# Patient Record
Sex: FEMALE | Race: AMERICAN INDIAN OR ALASKA NATIVE
[De-identification: names, ages, dates, MRNs, and addresses within clinical notes are randomized per-mention and may not be internally consistent; named-entity substitution may affect disease eponyms.]

---

## 2018-07-06 ENCOUNTER — HOSPITAL ENCOUNTER (INPATIENT)
Dept: HOSPITAL 5 - ED | Age: 63
LOS: 5 days | Discharge: HOME | DRG: 287 | End: 2018-07-11
Attending: INTERNAL MEDICINE | Admitting: HOSPITALIST
Payer: COMMERCIAL

## 2018-07-06 DIAGNOSIS — I50.82: ICD-10-CM

## 2018-07-06 DIAGNOSIS — I07.1: ICD-10-CM

## 2018-07-06 DIAGNOSIS — Q21.1: ICD-10-CM

## 2018-07-06 DIAGNOSIS — R09.02: ICD-10-CM

## 2018-07-06 DIAGNOSIS — Z23: ICD-10-CM

## 2018-07-06 DIAGNOSIS — E11.65: ICD-10-CM

## 2018-07-06 DIAGNOSIS — I27.20: ICD-10-CM

## 2018-07-06 DIAGNOSIS — Z82.49: ICD-10-CM

## 2018-07-06 DIAGNOSIS — R94.31: ICD-10-CM

## 2018-07-06 DIAGNOSIS — I50.23: Primary | ICD-10-CM

## 2018-07-06 DIAGNOSIS — E87.6: ICD-10-CM

## 2018-07-06 DIAGNOSIS — I42.8: ICD-10-CM

## 2018-07-06 DIAGNOSIS — Z83.3: ICD-10-CM

## 2018-07-06 LAB
APTT BLD: 26.7 SEC. (ref 24.2–36.6)
BASOPHILS # (AUTO): 0.1 K/MM3 (ref 0–0.1)
BASOPHILS NFR BLD AUTO: 1 % (ref 0–1.8)
BUN SERPL-MCNC: 21 MG/DL (ref 7–17)
BUN/CREAT SERPL: 21 %
CALCIUM SERPL-MCNC: 8.9 MG/DL (ref 8.4–10.2)
EOSINOPHIL # BLD AUTO: 0.1 K/MM3 (ref 0–0.4)
EOSINOPHIL NFR BLD AUTO: 1.4 % (ref 0–4.3)
HCT VFR BLD CALC: 40.4 % (ref 30.3–42.9)
HEMOLYSIS INDEX: 5
HGB BLD-MCNC: 13.2 GM/DL (ref 10.1–14.3)
INR PPP: 1.16 (ref 0.87–1.13)
LYMPHOCYTES # BLD AUTO: 1.1 K/MM3 (ref 1.2–5.4)
LYMPHOCYTES NFR BLD AUTO: 17.5 % (ref 13.4–35)
MCH RBC QN AUTO: 29 PG (ref 28–32)
MCHC RBC AUTO-ENTMCNC: 33 % (ref 30–34)
MCV RBC AUTO: 90 FL (ref 79–97)
MONOCYTES # (AUTO): 0.5 K/MM3 (ref 0–0.8)
MONOCYTES % (AUTO): 7.1 % (ref 0–7.3)
PLATELET # BLD: 289 K/MM3 (ref 140–440)
RBC # BLD AUTO: 4.49 M/MM3 (ref 3.65–5.03)

## 2018-07-06 PROCEDURE — 84443 ASSAY THYROID STIM HORMONE: CPT

## 2018-07-06 PROCEDURE — 93320 DOPPLER ECHO COMPLETE: CPT

## 2018-07-06 PROCEDURE — 85730 THROMBOPLASTIN TIME PARTIAL: CPT

## 2018-07-06 PROCEDURE — 83036 HEMOGLOBIN GLYCOSYLATED A1C: CPT

## 2018-07-06 PROCEDURE — 71250 CT THORAX DX C-: CPT

## 2018-07-06 PROCEDURE — 93306 TTE W/DOPPLER COMPLETE: CPT

## 2018-07-06 PROCEDURE — C1760 CLOSURE DEV, VASC: HCPCS

## 2018-07-06 PROCEDURE — 82962 GLUCOSE BLOOD TEST: CPT

## 2018-07-06 PROCEDURE — 93312 ECHO TRANSESOPHAGEAL: CPT

## 2018-07-06 PROCEDURE — 80048 BASIC METABOLIC PNL TOTAL CA: CPT

## 2018-07-06 PROCEDURE — 93325 DOPPLER ECHO COLOR FLOW MAPG: CPT

## 2018-07-06 PROCEDURE — 87116 MYCOBACTERIA CULTURE: CPT

## 2018-07-06 PROCEDURE — 83880 ASSAY OF NATRIURETIC PEPTIDE: CPT

## 2018-07-06 PROCEDURE — 96374 THER/PROPH/DIAG INJ IV PUSH: CPT

## 2018-07-06 PROCEDURE — 71045 X-RAY EXAM CHEST 1 VIEW: CPT

## 2018-07-06 PROCEDURE — 85025 COMPLETE CBC W/AUTO DIFF WBC: CPT

## 2018-07-06 PROCEDURE — 90732 PPSV23 VACC 2 YRS+ SUBQ/IM: CPT

## 2018-07-06 PROCEDURE — 94640 AIRWAY INHALATION TREATMENT: CPT

## 2018-07-06 PROCEDURE — 93460 R&L HRT ART/VENTRICLE ANGIO: CPT

## 2018-07-06 PROCEDURE — 94760 N-INVAS EAR/PLS OXIMETRY 1: CPT

## 2018-07-06 PROCEDURE — 85610 PROTHROMBIN TIME: CPT

## 2018-07-06 PROCEDURE — 36415 COLL VENOUS BLD VENIPUNCTURE: CPT

## 2018-07-06 PROCEDURE — 93005 ELECTROCARDIOGRAM TRACING: CPT

## 2018-07-06 PROCEDURE — 84484 ASSAY OF TROPONIN QUANT: CPT

## 2018-07-06 PROCEDURE — 93010 ELECTROCARDIOGRAM REPORT: CPT

## 2018-07-06 RX ADMIN — IPRATROPIUM BROMIDE AND ALBUTEROL SULFATE SCH AMPUL: .5; 3 SOLUTION RESPIRATORY (INHALATION) at 22:28

## 2018-07-06 NOTE — XRAY REPORT
FINAL REPORT



EXAM:  XR CHEST 1V AP



HISTORY:  DIFF BREATHING 



TECHNIQUE:  One view examination of the chest



PRIORS:  None



FINDINGS:  

Bilateral lung base linear and patchy opacity is slightly more

prominent on the right. No focal consolidation. This may be

atelectasis, edema, or slight interstitial pneumonitis.



No definite evidence of pneumothorax or pleural effusion.



Cardiac silhouette size slightly enlarged without definite

vascular congestion. No visible acute displaced fracture.

Atherosclerotic change with calcification in aortic arch.



IMPRESSION:  

Bilateral lung base linear and slight patchy opacity may reflect

atelectasis, edema, and/or interstitial pneumonitis

## 2018-07-06 NOTE — EMERGENCY DEPARTMENT REPORT
HPI





- General


Chief Complaint: Dyspnea/Respdistress


Time Seen by Provider: 18 20:11





- HPI


HPI: 





Room 25





The patient is a 62-year-old female presenting with a chief complaint of 

shortness of breath.  The patient states she has had shortness of breath for 3 

months.  The patient states she's been to the hospital several times for 

shortness of breath but is persistently short of breath at discharge.  Patient 

states for the past 2 weeks she's noted bilateral lower extremity edema.  The 

patient states she's been compliant with her Lasix until she ran out 

approximately 1 week ago.  Patient denies chest pain





Location: Lungs, feet


Duration: [See above]


Quality: Shortness of breath


Severity:  moderate


Modifying factors: [see above]


Context: [see above]


Mode of transportation: [not driving]





ED Past Medical Hx





- Past Medical History


Hx Congestive Heart Failure: Yes





- Surgical History


Past Surgical History?: No





- Family History


Family history: no significant





- Social History


Smoking Status: Never Smoker


Substance Use Type: None (denies illicit drug use)





ED Review of Systems


ROS: 


Stated complaint: MINA


Other details as noted in HPI





Constitutional: no symptoms reported


Eyes: denies: eye pain


ENT: denies: throat pain


Respiratory: shortness of breath


Cardiovascular: denies: chest pain


Endocrine: denies: unexplained weight gain


Gastrointestinal: denies: abdominal pain


Genitourinary: denies: dysuria


Musculoskeletal: denies: back pain


Skin: denies: change in color


Neurological: denies: headache


Hematological/Lymphatic: other (bilateral lower extremity edema)





Physical Exam





- Physical Exam


Vital Signs: 


 Vital Signs











  18





  14:43 19:59


 


Temperature 98.1 F 


 


Pulse Rate 103 H 85


 


Respiratory 24 20





Rate  


 


Blood Pressure 105/71 


 


O2 Sat by Pulse 87 99





Oximetry  











Physical Exam: 





GENERAL: The patient is well-developed well-nourished female lying on stretcher 

not appearing to be in acute distress. []


HEENT: Normocephalic.  Atraumatic.  Extraocular motions are intact.  Patient 

has moist mucous membranes.


NECK: Supple.  Trachea midline


CHEST/LUNGS: Bibasilar crackles.  There is no respiratory distress noted.


HEART/CARDIOVASCULAR: Regular.  There is no tachycardia.  There is no gallop 

rub or murmur.


ABDOMEN: Abdomen is soft, nontender.  Patient has normal bowel sounds.  There 

is no abdominal distention.


SKIN: There is no rash.  There is 2-3+ bilateral lower extremity pitting edema.

  There is no diaphoresis.


NEURO: The patient is awake, alert, and oriented.  The patient is cooperative.  

The patient has normal speech 


MUSCULOSKELETAL:  There is no evidence of acute injury.





ED Course


 Vital Signs











  18





  14:43 19:59


 


Temperature 98.1 F 


 


Pulse Rate 103 H 85


 


Respiratory 24 20





Rate  


 


Blood Pressure 105/71 


 


O2 Sat by Pulse 87 99





Oximetry  














ED Medical Decision Making





- Lab Data


Result diagrams: 


 18 15:09





 18 15:09





 Laboratory Tests











  18





  15:09 15:09 15:09


 


WBC   6.3 


 


RBC   4.49 


 


Hgb   13.2 


 


Hct   40.4 


 


MCV   90 


 


MCH   29 


 


MCHC   33 


 


RDW   15.9 H 


 


Plt Count   289 


 


Lymph % (Auto)   17.5 


 


Mono % (Auto)   7.1 


 


Eos % (Auto)   1.4 


 


Baso % (Auto)   1.0 


 


Lymph #   1.1 L 


 


Mono #   0.5 


 


Eos #   0.1 


 


Baso #   0.1 


 


Seg Neutrophils %   73.0 H 


 


Seg Neutrophils #   4.6 


 


PT    15.4 H


 


INR    1.16 H


 


APTT    26.7


 


Sodium  138  


 


Potassium  3.7  


 


Chloride  99.9  


 


Carbon Dioxide  26  


 


Anion Gap  16  


 


BUN  21 H  


 


Creatinine  1.0  


 


Estimated GFR  57  


 


BUN/Creatinine Ratio  21  


 


Glucose  135 H  


 


Calcium  8.9  


 


Troponin T  0.021  


 


NT-Pro-B Natriuret Pep   














  18





  15:09


 


WBC 


 


RBC 


 


Hgb 


 


Hct 


 


MCV 


 


MCH 


 


MCHC 


 


RDW 


 


Plt Count 


 


Lymph % (Auto) 


 


Mono % (Auto) 


 


Eos % (Auto) 


 


Baso % (Auto) 


 


Lymph # 


 


Mono # 


 


Eos # 


 


Baso # 


 


Seg Neutrophils % 


 


Seg Neutrophils # 


 


PT 


 


INR 


 


APTT 


 


Sodium 


 


Potassium 


 


Chloride 


 


Carbon Dioxide 


 


Anion Gap 


 


BUN 


 


Creatinine 


 


Estimated GFR 


 


BUN/Creatinine Ratio 


 


Glucose 


 


Calcium 


 


Troponin T 


 


NT-Pro-B Natriuret Pep  2424 H














- EKG Data


-: EKG Interpreted by Me


EKG shows normal: sinus rhythm


Rate: normal





- EKG Data


When compared to previous EKG there are: previous EKG unavailable


Interpretation: other (no ischemic changes seen)





- Radiology Data


Radiology results: report reviewed (chest x-ray), image reviewed (chest x-ray)


interpreted by me: 





Chest x-ray-edema at bilateral bases.  No pneumothorax





[facility] [address] [place] [address] 

XRay Report Signed Patient: ROBERTO SNELL MR#: [id number] : 1955 Acct:

[id number] Age/Sex: 62 / F ADM Date: 18 Loc: ED Attending Dr: Ordering 

Physician: CARINA JACKSON MD Date of Service: 18 Procedure(s): XR chest 1V ap 

Accession Number(s): [id number] cc: ED MD MANUEL Fluoro Time In Minutes: FINAL 

REPORT EXAM: XR CHEST 1V AP HISTORY: DIFF BREATHING TECHNIQUE: One view 

examination of the chest PRIORS: None FINDINGS: Bilateral lung base linear and 

patchy opacity is slightly more prominent on the right. No focal consolidation. 

This may be atelectasis, edema, or slight interstitial pneumonitis. No definite 

evidence of pneumothorax or pleural effusion. Cardiac silhouette size slightly 

enlarged without definite vascular congestion. No visible acute displaced 

fracture. Atherosclerotic change with calcification in aortic arch. IMPRESSION: 

Bilateral lung base linear and slight patchy opacity may reflect atelectasis, 

edema, and/or interstitial pneumonitis Transcribed By: BAL Dictated By: CHULA GOOD MD Electronically Authenticated By: CHULA GOOD MD Signed Date/

Time: 18 DD/DT: 18 TD/TT: 18 





- Differential Diagnosis


CHF exacerbation, pneumonia, bronchitis


Critical care attestation.: 


If time is entered above; I have spent that time in minutes in the direct care 

of this critically ill patient, excluding procedure time.








ED Disposition


Clinical Impression: 


 Shortness of breath, CHF exacerbation





Disposition:  OP ADMIT IP TO THIS HOSP


Is pt being admited?: No


Does the pt Need Aspirin: No


Condition: Fair


Referrals: 


PRIMARY CARE,MD [Primary Care Provider] - 3-5 Days


Time of Disposition: 20:35 (hospitalist paged (Dr Bowen))

## 2018-07-06 NOTE — HISTORY AND PHYSICAL REPORT
History of Present Illness


Date of examination: 07/06/18


Date of admission: 


07/06/18 21:39





Chief complaint: 





Shortness of breath for 3 months


Chronic cough for 5 years


History of present illness: 





62-year-old -American female with a questionable history of congestive 

heart failure and apparently admitted to [facility] 3 times in 

the past 3 months, with a diagnosis of CHF and was told to see cardiologist and 

pulmonologist as an outpatient comes in with complaints of persistent shortness 

of breath for the past 3 months.  She says the shortness of breath is on 

minimal exertion.  She denies any exertional chest pain palpitations or 

syncope.  Denies any history of paroxysmal nocturnal dyspnea but states she 

sleeps almost in a sitting up position.  She denies any fever or chills.  Has 

chronic cough with mucoid sputum for the past 5 years.  She denies any nasal 

congestion or headaches.  Denies nausea vomiting diarrhea or abdominal pain 

hematemesis or melena.  Denies dysuria or urinary frequency.  She states she 

ran out of Lasix a week ago.  She denies taking any other medications or 

inhalers.  She states she has not seen a cardiologist or pulmonologist during 

the last 3 admissions and denies having had any echocardiogram


She is being admitted for further evaluation of for shortness of breath





Past History


Past Medical History: heart failure


Past Surgical History: No surgical history


Social history: no significant social history.  denies: smoking, alcohol abuse, 

prescription drug abuse, IV drug use


Family history: CAD, diabetes, hypertension





Medications and Allergies


 Allergies











Allergy/AdvReac Type Severity Reaction Status Date / Time


 


No Known Allergies Allergy   Unverified 07/06/18 14:43











Active Meds: 


Active Medications





Acetaminophen (Tylenol)  650 mg PO Q4H PRN


   PRN Reason: Pain MILD(1-3)/Fever >100.5/HA


Acetaminophen/Hydrocodone Bitart (Norco 5/325)  1 each PO Q6H PRN


   PRN Reason: Pain, Moderate (4-6)


Albuterol/Ipratropium (Duoneb *Not For Prn Use*)  1 ampul IH Q6HRT [facility]


   Last Admin: 07/06/18 22:28 Dose:  1 ampul


Furosemide (Lasix)  20 mg IV Q12H [facility]


Heparin Sodium (Porcine) (Heparin)  5,000 unit SUB-Q Q8HR [facility]


Lisinopril (Zestril)  2.5 mg PO DAILY [facility]


Metoprolol Tartrate (Lopressor)  6.25 mg PO Q12HR [facility]


Ondansetron HCl (Zofran)  4 mg IV Q8H PRN


   PRN Reason: Nausea And Vomiting


Sodium Chloride (Sodium Chloride Flush Syringe 10 Ml)  10 ml IV BID [facility]


Sodium Chloride (Sodium Chloride Flush Syringe 10 Ml)  10 ml IV PRN PRN


   PRN Reason: LINE FLUSH











Review of Systems


All systems: negative (as stated above in the history of present illness)





Exam





- Constitutional


Vitals: 


 











Temp Pulse Resp BP Pulse Ox


 


 98.1 F   81   15   120/88   98 


 


 07/06/18 14:43  07/06/18 22:44  07/06/18 22:51  07/06/18 22:40  07/06/18 22:51











General appearance: Present: no acute distress





- EENT


Eyes: Present: PERRL, EOM intact


ENT: hearing intact, clear oral mucosa





- Neck


Neck: Present: supple, normal ROM.  Absent: masses or JVD





- Respiratory


Respiratory effort: normal


Respiratory: bilateral: rales (bilateral basal Rales), negative: rhonchi, 

wheezing





- Cardiovascular


Rhythm: regular


Heart Sounds: Present: S1 & S2





- Extremities


Extremity abnormal: edema (proposed bilateral pitting leg edema)


Peripheral Pulses: within normal limits





- Abdominal


General gastrointestinal: Present: soft, non-tender.  Absent: hepatomegaly, 

splenomegaly


Female genitourinary: Present: deferred





- Rectal


Rectal Exam: deferred





- Integumentary


Integumentary: Present: clear





- Musculoskeletal


Musculoskeletal: strength equal bilaterally





- Psychiatric


Psychiatric: appropriate mood/affect





- Neurologic


Neurologic: no focal deficits, moves all extremities





Results





- Labs


CBC & Chem 7: 


 07/06/18 15:09





 07/06/18 15:09


Labs: 


 Abnormal lab results











  07/06/18 07/06/18 07/06/18 Range/Units





  15:09 15:09 15:09 


 


RDW   15.9 H   (13.2-15.2)  %


 


Lymph #   1.1 L   (1.2-5.4)  K/mm3


 


Seg Neutrophils %   73.0 H   (40.0-70.0)  %


 


PT    15.4 H  (12.2-14.9)  Sec.


 


INR    1.16 H  (0.87-1.13)  


 


BUN  21 H    (7-17)  mg/dL


 


Glucose  135 H    ()  mg/dL


 


NT-Pro-B Natriuret Pep     (0-900)  pg/mL














  07/06/18 Range/Units





  15:09 


 


RDW   (13.2-15.2)  %


 


Lymph #   (1.2-5.4)  K/mm3


 


Seg Neutrophils %   (40.0-70.0)  %


 


PT   (12.2-14.9)  Sec.


 


INR   (0.87-1.13)  


 


BUN   (7-17)  mg/dL


 


Glucose   ()  mg/dL


 


NT-Pro-B Natriuret Pep  2424 H  (0-900)  pg/mL














Assessment and Plan





- Patient Problems


(1) CHF exacerbation


Current Visit: Yes   Status: Acute   


Qualifiers: 


   Heart failure type: unspecified   Qualified Code(s): I50.9 - Heart failure, 

unspecified   


Plan to address problem: 


Admitted  the patient to telemetry


Oxygen via nasal cannula


We will order echocardiogram


Consult cardiology


BNP is moderately elevated


Troponin negative


EKG shows a heart rate of 100 bpm sinus tachycardia low-voltage QRS complex 

with right ventricular hypertrophy


Lasix IV


We will start the patient on low-dose ACE inhibitor with lisinopril 2.5 and 

metoprolol 6.25 mg twice a day


We will await cardiology follow-up for further recommendations








(2) Chronic cough


Current Visit: Yes   Status: Acute   


Plan to address problem: 


Patient states she has been coughing for the past 5 years


We will request a pulmonary consult to rule out ?? ILD


Empirically start the patient on DuoNeb solution via nebulizer and prednisone








(3) Hypoxia


Current Visit: Yes   Status: Acute   


Plan to address problem: 


Patient's O2 saturation initially was 85% unclear if this was on room air


However it went up to about 95% with oxygen








(4) Hyperglycemia


Current Visit: Yes   Status: Acute   


Plan to address problem: 


check hemoglobin A1c to rule out diabetes

## 2018-07-07 LAB
BUN SERPL-MCNC: 21 MG/DL (ref 7–17)
BUN/CREAT SERPL: 26 %
CALCIUM SERPL-MCNC: 8.6 MG/DL (ref 8.4–10.2)
HEMOLYSIS INDEX: 5

## 2018-07-07 PROCEDURE — 3E0234Z INTRODUCTION OF SERUM, TOXOID AND VACCINE INTO MUSCLE, PERCUTANEOUS APPROACH: ICD-10-PCS | Performed by: HOSPITALIST

## 2018-07-07 RX ADMIN — IPRATROPIUM BROMIDE AND ALBUTEROL SULFATE SCH: .5; 3 SOLUTION RESPIRATORY (INHALATION) at 21:02

## 2018-07-07 RX ADMIN — POTASSIUM CHLORIDE SCH MEQ: 1500 TABLET, EXTENDED RELEASE ORAL at 16:27

## 2018-07-07 RX ADMIN — METOPROLOL TARTRATE SCH: 25 TABLET, FILM COATED ORAL at 00:39

## 2018-07-07 RX ADMIN — IPRATROPIUM BROMIDE AND ALBUTEROL SULFATE SCH: .5; 3 SOLUTION RESPIRATORY (INHALATION) at 01:06

## 2018-07-07 RX ADMIN — METOPROLOL TARTRATE SCH MG: 25 TABLET, FILM COATED ORAL at 10:35

## 2018-07-07 RX ADMIN — FUROSEMIDE SCH: 10 INJECTION, SOLUTION INTRAVENOUS at 15:39

## 2018-07-07 RX ADMIN — HEPARIN SODIUM SCH: 5000 INJECTION, SOLUTION INTRAVENOUS; SUBCUTANEOUS at 00:40

## 2018-07-07 RX ADMIN — LISINOPRIL SCH MG: 5 TABLET ORAL at 10:34

## 2018-07-07 RX ADMIN — IPRATROPIUM BROMIDE AND ALBUTEROL SULFATE SCH AMPUL: .5; 3 SOLUTION RESPIRATORY (INHALATION) at 15:57

## 2018-07-07 RX ADMIN — IPRATROPIUM BROMIDE AND ALBUTEROL SULFATE SCH: .5; 3 SOLUTION RESPIRATORY (INHALATION) at 08:51

## 2018-07-07 RX ADMIN — HEPARIN SODIUM SCH: 5000 INJECTION, SOLUTION INTRAVENOUS; SUBCUTANEOUS at 14:00

## 2018-07-07 RX ADMIN — FUROSEMIDE SCH MG: 10 INJECTION, SOLUTION INTRAVENOUS at 17:37

## 2018-07-07 RX ADMIN — Medication SCH ML: at 10:36

## 2018-07-07 RX ADMIN — METOPROLOL TARTRATE SCH: 25 TABLET, FILM COATED ORAL at 10:38

## 2018-07-07 RX ADMIN — HEPARIN SODIUM SCH: 5000 INJECTION, SOLUTION INTRAVENOUS; SUBCUTANEOUS at 05:50

## 2018-07-07 RX ADMIN — LISINOPRIL SCH: 5 TABLET ORAL at 10:39

## 2018-07-07 RX ADMIN — METOPROLOL TARTRATE SCH: 25 TABLET, FILM COATED ORAL at 22:03

## 2018-07-07 RX ADMIN — POTASSIUM CHLORIDE SCH MEQ: 1500 TABLET, EXTENDED RELEASE ORAL at 22:03

## 2018-07-07 RX ADMIN — HEPARIN SODIUM SCH: 5000 INJECTION, SOLUTION INTRAVENOUS; SUBCUTANEOUS at 22:04

## 2018-07-07 RX ADMIN — Medication SCH ML: at 22:03

## 2018-07-07 RX ADMIN — PREDNISONE SCH MG: 20 TABLET ORAL at 10:34

## 2018-07-07 RX ADMIN — Medication SCH ML: at 00:40

## 2018-07-07 NOTE — PROGRESS NOTE
Assessment and Plan


Assessment and plan: 


Acute on chronic CHF.


Patient was just diagnosed with CHF about 3 months ago and has had multiple 

hospitalizations. She ran out of lasix


Lasix iv bid


cardiology consulted,


Echo ordered





Prediabetes.


Start diabetic diet.


Fingerstick glucose  Qac and hs





hypokalemia.


Replace orally and re-check potassium level in the morning.  





DVT prophylaxis with Heparin.





Full code status








History


Interval history: 


Shortness of breath


swelling both legs








Hospitalist Physical





- Physical exam


Narrative exam: 


Gen : Not in acute distress, sitting up in bed


HEENT:Normocephalic, atraumatic


Neck: supple, JVD presented


Lungs: Biltateral crackles, no rhonchi


Heart :S1 and S2 reg, no murmurs, rubs or gallop


Abd:soft, non tender, non distended, normal bowel sounds


Ext: Bilateral pitting pedal edema, no clubbing, no cyanosis, 


Neuro: Awake,alert,oriented x 3, no focal signs


Psych:normal mood











- Constitutional


Vitals: 


 











Temp Pulse Resp BP Pulse Ox


 


 97.7 F   81   16   114/77   95 


 


 07/07/18 11:05  07/07/18 11:05  07/07/18 11:05  07/07/18 11:05  07/07/18 11:05











General appearance: Present: no acute distress





Results





- Labs


CBC & Chem 7: 


 07/06/18 15:09





 07/07/18 04:19


Labs: 


 Laboratory Last Values











WBC  6.3 K/mm3 (4.5-11.0)   07/06/18  15:09    


 


RBC  4.49 M/mm3 (3.65-5.03)   07/06/18  15:09    


 


Hgb  13.2 gm/dl (10.1-14.3)   07/06/18  15:09    


 


Hct  40.4 % (30.3-42.9)   07/06/18  15:09    


 


MCV  90 fl (79-97)   07/06/18  15:09    


 


MCH  29 pg (28-32)   07/06/18  15:09    


 


MCHC  33 % (30-34)   07/06/18  15:09    


 


RDW  15.9 % (13.2-15.2)  H  07/06/18  15:09    


 


Plt Count  289 K/mm3 (140-440)   07/06/18  15:09    


 


Lymph % (Auto)  17.5 % (13.4-35.0)   07/06/18  15:09    


 


Mono % (Auto)  7.1 % (0.0-7.3)   07/06/18  15:09    


 


Eos % (Auto)  1.4 % (0.0-4.3)   07/06/18  15:09    


 


Baso % (Auto)  1.0 % (0.0-1.8)   07/06/18  15:09    


 


Lymph #  1.1 K/mm3 (1.2-5.4)  L  07/06/18  15:09    


 


Mono #  0.5 K/mm3 (0.0-0.8)   07/06/18  15:09    


 


Eos #  0.1 K/mm3 (0.0-0.4)   07/06/18  15:09    


 


Baso #  0.1 K/mm3 (0.0-0.1)   07/06/18  15:09    


 


Seg Neutrophils %  73.0 % (40.0-70.0)  H  07/06/18  15:09    


 


Seg Neutrophils #  4.6 K/mm3 (1.8-7.7)   07/06/18  15:09    


 


PT  15.4 Sec. (12.2-14.9)  H  07/06/18  15:09    


 


INR  1.16  (0.87-1.13)  H  07/06/18  15:09    


 


APTT  26.7 Sec. (24.2-36.6)   07/06/18  15:09    


 


Sodium  137 mmol/L (137-145)   07/07/18  04:19    


 


Potassium  3.4 mmol/L (3.6-5.0)  L  07/07/18  04:19    


 


Chloride  100.5 mmol/L ()   07/07/18  04:19    


 


Carbon Dioxide  24 mmol/L (22-30)   07/07/18  04:19    


 


Anion Gap  16 mmol/L  07/07/18  04:19    


 


BUN  21 mg/dL (7-17)  H  07/07/18  04:19    


 


Creatinine  0.8 mg/dL (0.7-1.2)   07/07/18  04:19    


 


Estimated GFR  > 60 ml/min  07/07/18  04:19    


 


BUN/Creatinine Ratio  26 %  07/07/18  04:19    


 


Glucose  124 mg/dL ()  H  07/07/18  04:19    


 


Hemoglobin A1c  6.4 % (4-6)  H  07/07/18  04:19    


 


Calcium  8.6 mg/dL (8.4-10.2)   07/07/18  04:19    


 


Troponin T  0.021 ng/mL (0.00-0.029)   07/06/18  15:09    


 


NT-Pro-B Natriuret Pep  2424 pg/mL (0-900)  H  07/06/18  15:09    


 


TSH  1.260 mlU/mL (0.270-4.200)   07/07/18  04:19

## 2018-07-07 NOTE — CAT SCAN REPORT
FINAL REPORT



EXAM:  CT CHEST WO CON



HISTORY:  Concern for ILD 



TECHNIQUE:  Standard unenhanced CT of the chest at 2.5 mm axial

increments. Coronal and sagittal reconstruction was also

obtained. 



PRIORS:  None.



FINDINGS:  

There is interstitial prominence throughout both lungs

particularly in the periphery bilaterally. More coarsened lung

markings are present in the lung bases posteriorly with a few air

bronchograms identified in the right middle lobe. These likely

represent infiltrate superimposed on chronic fibrotic changes.

Alternatively, these could be chronic as well and can be

correlated clinically. 



Several small ground-glass nodules are present in the apices

bilaterally 



There is extensive adenopathy throughout the mediastinum. The

largest lymph node is in the right paratracheal region measuring

1.7 cm. This may be reactive. There is no evidence for bilateral

hilar or axillary adenopathy.  The esophagus is collapsed.  The

trachea is midline. 



Calcification of aorta is noted. There is a small low-density

pericardial effusion. Cardiac size is mildly enlarged. Aorta is

normal in caliber.



Images through the lung bases include upper abdomen which show no

abnormality of the visualized abdominal viscera. 



Bony structures show no focal abnormalities. No evidence for bony

fracture is seen.



IMPRESSION:  

1. Interstitial prominence throughout the lungs particularly in

the periphery, likely chronic 



2. More coarsened lung markings in each lung base posteriorly and

in the right middle lobe. With air bronchograms noted in right

middle lobe, best ability of infiltrate superimposed on chronic

fibrotic changes should be considered. However, this could also

be a completely chronic findings 



3. Extensive mediastinal adenopathy which may be reactive 



4. Small ground-glass nodules in the apices bilaterally, most

typically inflammatory or infectious. These can be followed. 



5. Small pericardial effusion with mild cardiomegaly

## 2018-07-07 NOTE — CONSULTATION
History of Present Illness


Consult date: 07/07/18





Past History


Past Medical History: heart failure


Past Surgical History: No surgical history


Social history: no significant social history.  denies: smoking, alcohol abuse, 

prescription drug abuse, IV drug use


Family history: CAD, diabetes, hypertension





Medications and Allergies


 Allergies











Allergy/AdvReac Type Severity Reaction Status Date / Time


 


No Known Allergies Allergy   Unverified 07/06/18 14:43











Active Meds: 


Active Medications





Acetaminophen (Tylenol)  650 mg PO Q4H PRN


   PRN Reason: Pain MILD(1-3)/Fever >100.5/HA


Acetaminophen/Hydrocodone Bitart (Norco 5/325)  1 each PO Q6H PRN


   PRN Reason: Pain, Moderate (4-6)


Albuterol/Ipratropium (Duoneb *Not For Prn Use*)  1 ampul IH Q6HRT [facility]


   Last Admin: 07/07/18 08:51 Dose:  Not Given


Furosemide (Lasix)  40 mg IV 0600,1800 [facility]


Heparin Sodium (Porcine) (Heparin)  5,000 unit SUB-Q Q8HR [facility]


   Last Admin: 07/07/18 05:50 Dose:  Not Given


Lisinopril (Zestril)  2.5 mg PO DAILY [facility]


   Last Admin: 07/07/18 10:39 Dose:  Not Given


Ondansetron HCl (Zofran)  4 mg IV Q8H PRN


   PRN Reason: Nausea And Vomiting


Prednisone (Deltasone)  40 mg PO QDAY [facility]


   Last Admin: 07/07/18 10:34 Dose:  40 mg


Sodium Chloride (Sodium Chloride Flush Syringe 10 Ml)  10 ml IV BID [facility]


   Last Admin: 07/07/18 10:36 Dose:  10 ml


Sodium Chloride (Sodium Chloride Flush Syringe 10 Ml)  10 ml IV PRN PRN


   PRN Reason: LINE FLUSH











Physical Examination


 Vital Signs











Temp Pulse Resp BP Pulse Ox


 


 98.1 F   103 H  24   105/71   87 


 


 07/06/18 14:43  07/06/18 14:43  07/06/18 14:43  07/06/18 14:43  07/06/18 14:43














Results





 07/06/18 15:09





 07/07/18 04:19


 Coagulation











  07/06/18 Range/Units





  15:09 


 


PT  15.4 H  (12.2-14.9)  Sec.


 


INR  1.16 H  (0.87-1.13)  


 


APTT  26.7  (24.2-36.6)  Sec.








 CBC











  07/06/18 Range/Units





  15:09 


 


WBC  6.3  (4.5-11.0)  K/mm3


 


RBC  4.49  (3.65-5.03)  M/mm3


 


Hgb  13.2  (10.1-14.3)  gm/dl


 


Hct  40.4  (30.3-42.9)  %


 


Plt Count  289  (140-440)  K/mm3


 


Lymph #  1.1 L  (1.2-5.4)  K/mm3


 


Mono #  0.5  (0.0-0.8)  K/mm3


 


Eos #  0.1  (0.0-0.4)  K/mm3


 


Baso #  0.1  (0.0-0.1)  K/mm3








 Comprehensive Metabolic Panel











  07/06/18 07/07/18 Range/Units





  15:09 04:19 


 


Sodium  138  137  (137-145)  mmol/L


 


Potassium  3.7  3.4 L  (3.6-5.0)  mmol/L


 


Chloride  99.9  100.5  ()  mmol/L


 


Carbon Dioxide  26  24  (22-30)  mmol/L


 


BUN  21 H  21 H  (7-17)  mg/dL


 


Creatinine  1.0  0.8  (0.7-1.2)  mg/dL


 


Glucose  135 H  124 H  ()  mg/dL


 


Calcium  8.9  8.6  (8.4-10.2)  mg/dL














Assessment and Plan





Detailed Cardiology consult dictated.

## 2018-07-08 LAB
BUN SERPL-MCNC: 19 MG/DL (ref 7–17)
BUN/CREAT SERPL: 24 %
CALCIUM SERPL-MCNC: 9.1 MG/DL (ref 8.4–10.2)
HEMOLYSIS INDEX: 85

## 2018-07-08 RX ADMIN — IPRATROPIUM BROMIDE AND ALBUTEROL SULFATE SCH: .5; 3 SOLUTION RESPIRATORY (INHALATION) at 19:39

## 2018-07-08 RX ADMIN — HEPARIN SODIUM SCH: 5000 INJECTION, SOLUTION INTRAVENOUS; SUBCUTANEOUS at 22:41

## 2018-07-08 RX ADMIN — PREDNISONE SCH MG: 20 TABLET ORAL at 11:24

## 2018-07-08 RX ADMIN — METOPROLOL TARTRATE SCH: 25 TABLET, FILM COATED ORAL at 11:15

## 2018-07-08 RX ADMIN — Medication SCH ML: at 22:41

## 2018-07-08 RX ADMIN — IPRATROPIUM BROMIDE AND ALBUTEROL SULFATE SCH AMPUL: .5; 3 SOLUTION RESPIRATORY (INHALATION) at 09:18

## 2018-07-08 RX ADMIN — LISINOPRIL SCH: 5 TABLET ORAL at 11:15

## 2018-07-08 RX ADMIN — IPRATROPIUM BROMIDE AND ALBUTEROL SULFATE SCH: .5; 3 SOLUTION RESPIRATORY (INHALATION) at 02:23

## 2018-07-08 RX ADMIN — IPRATROPIUM BROMIDE AND ALBUTEROL SULFATE SCH AMPUL: .5; 3 SOLUTION RESPIRATORY (INHALATION) at 14:10

## 2018-07-08 RX ADMIN — HEPARIN SODIUM SCH: 5000 INJECTION, SOLUTION INTRAVENOUS; SUBCUTANEOUS at 05:44

## 2018-07-08 RX ADMIN — Medication SCH ML: at 11:25

## 2018-07-08 RX ADMIN — FUROSEMIDE SCH MG: 10 INJECTION, SOLUTION INTRAVENOUS at 18:45

## 2018-07-08 RX ADMIN — METOPROLOL TARTRATE SCH: 25 TABLET, FILM COATED ORAL at 22:41

## 2018-07-08 RX ADMIN — FUROSEMIDE SCH MG: 10 INJECTION, SOLUTION INTRAVENOUS at 05:42

## 2018-07-08 RX ADMIN — HEPARIN SODIUM SCH: 5000 INJECTION, SOLUTION INTRAVENOUS; SUBCUTANEOUS at 14:58

## 2018-07-08 NOTE — PROGRESS NOTE
Assessment and Plan


Assessment and plan: 


Acute on chronic systolic CHF.


Patient was just diagnosed with CHF about 3 months ago and has had multiple 

hospitalizations. She ran out of lasix


Continue Lasix 40 iv bid


cardiology consulted,


Echo shows EF 45-50%





Prediabetes.


Start diabetic diet.


Fingerstick glucose  Qac and hs





Hypokalemia.


Now resolved.


Will discontinue Potassium





DVT prophylaxis with Heparin.





Full code status








History


Interval history: 


Feels better,


Less shortness of breath


less swelling both legs








Hospitalist Physical





- Physical exam


Narrative exam: 


Gen : Not in acute distress, sitting up in bed


HEENT:Normocephalic, atraumatic


Neck: supple, JVD presented


Lungs: Biltateral crackles, no rhonchi


Heart :S1 and S2 reg, no murmurs, rubs or gallop


Abd:soft, non tender, non distended, normal bowel sounds


Ext: Bilateral pitting pedal edema, no clubbing, no cyanosis, 


Neuro: Awake,alert,oriented x 3, no focal signs


Psych:normal mood











- Constitutional


Vitals: 


 











Temp Pulse Resp BP Pulse Ox


 


 97.6 F   76   20   102/70   90 


 


 07/08/18 04:24  07/08/18 04:24  07/08/18 04:24  07/08/18 04:24  07/08/18 04:24











General appearance: Present: no acute distress





Results





- Labs


CBC & Chem 7: 


 07/06/18 15:09





 07/08/18 07:30


Labs: 


 Laboratory Last Values











WBC  6.3 K/mm3 (4.5-11.0)   07/06/18  15:09    


 


RBC  4.49 M/mm3 (3.65-5.03)   07/06/18  15:09    


 


Hgb  13.2 gm/dl (10.1-14.3)   07/06/18  15:09    


 


Hct  40.4 % (30.3-42.9)   07/06/18  15:09    


 


MCV  90 fl (79-97)   07/06/18  15:09    


 


MCH  29 pg (28-32)   07/06/18  15:09    


 


MCHC  33 % (30-34)   07/06/18  15:09    


 


RDW  15.9 % (13.2-15.2)  H  07/06/18  15:09    


 


Plt Count  289 K/mm3 (140-440)   07/06/18  15:09    


 


Lymph % (Auto)  17.5 % (13.4-35.0)   07/06/18  15:09    


 


Mono % (Auto)  7.1 % (0.0-7.3)   07/06/18  15:09    


 


Eos % (Auto)  1.4 % (0.0-4.3)   07/06/18  15:09    


 


Baso % (Auto)  1.0 % (0.0-1.8)   07/06/18  15:09    


 


Lymph #  1.1 K/mm3 (1.2-5.4)  L  07/06/18  15:09    


 


Mono #  0.5 K/mm3 (0.0-0.8)   07/06/18  15:09    


 


Eos #  0.1 K/mm3 (0.0-0.4)   07/06/18  15:09    


 


Baso #  0.1 K/mm3 (0.0-0.1)   07/06/18  15:09    


 


Seg Neutrophils %  73.0 % (40.0-70.0)  H  07/06/18  15:09    


 


Seg Neutrophils #  4.6 K/mm3 (1.8-7.7)   07/06/18  15:09    


 


PT  15.4 Sec. (12.2-14.9)  H  07/06/18  15:09    


 


INR  1.16  (0.87-1.13)  H  07/06/18  15:09    


 


APTT  26.7 Sec. (24.2-36.6)   07/06/18  15:09    


 


Sodium  138 mmol/L (137-145)   07/08/18  07:30    


 


Potassium  4.8 mmol/L (3.6-5.0)  D 07/08/18  07:30    


 


Chloride  99.8 mmol/L ()   07/08/18  07:30    


 


Carbon Dioxide  22 mmol/L (22-30)   07/08/18  07:30    


 


Anion Gap  21 mmol/L  07/08/18  07:30    


 


BUN  19 mg/dL (7-17)  H  07/08/18  07:30    


 


Creatinine  0.8 mg/dL (0.7-1.2)   07/08/18  07:30    


 


Estimated GFR  > 60 ml/min  07/08/18  07:30    


 


BUN/Creatinine Ratio  24 %  07/08/18  07:30    


 


Glucose  114 mg/dL ()  H  07/08/18  07:30    


 


Hemoglobin A1c  6.4 % (4-6)  H  07/07/18  04:19    


 


Calcium  9.1 mg/dL (8.4-10.2)   07/08/18  07:30    


 


Troponin T  0.021 ng/mL (0.00-0.029)   07/06/18  15:09    


 


NT-Pro-B Natriuret Pep  2424 pg/mL (0-900)  H  07/06/18  15:09    


 


TSH  1.260 mlU/mL (0.270-4.200)   07/07/18  04:19

## 2018-07-08 NOTE — CONSULTATION
CARDIOLOGY CONSULTATION



REFERRING PHYSICIAN:  Dr. Rolo Mendoza, hospitalist.



HISTORY OF PRESENT ILLNESS:  A 62-year-old [facility] -American woman with

a history of "Congestive heart failure" was admitted with chronic shortness

of

breath on mild-to-moderate exertion for the past 4 months.  She was admitted at

[facility] three times with this diagnosis and treated for the

same.  She also gives a history of swelling of both legs and feet for the past

2-3 weeks in duration.  No history of orthopnea or paroxysmal nocturnal dyspnea.

 She has chronic cough with mucoid expectoration for several months.  She was on

diuretics and she ran out of it and she started having these symptoms and she

was admitted.  No history of hypertension or diabetes mellitus.  She does not

know her lipid status.  No history of any thyroid problems.  One set of troponin

is negative.  ProBNP was 2424.  Glucose was slightly increased to 135 and 124.



PAST MEDICAL HISTORY:  History of "CHF", history of

chronic

cough.  No history of CAD or myocardial infarction in the past.



PAST SURGICAL HISTORY:  She has had tubal ligation in the past.



SOCIAL HISTORY:  Not a smoker, not an alcoholic, no history of drug abuse.



FAMILY HISTORY:  Negative for premature coronary artery disease; however, there

is a family history of hypertension, diabetes mellitus and CAD.



ALLERGIES:  PENICILLIN.



MEDICATIONS:  Furosemide 40 mg IV b.i.d., subcutaneous heparin 5000 units q.8

hours, metoprolol 6.25 mg p.o. b.i.d., lisinopril 2.5 mg p.o. daily, and

prednisone 40 mg p.o. daily.



REVIEW OF SYSTEMS:

CARDIOVASCULAR:  As described in the history.

PULMONARY:  As described in the history.

GENITOURINARY SYSTEM:  As described in the history.

Review of rest of the 10 systems is negative.



PHYSICAL EXAMINATION:

GENERAL:  A 62-year-old [facility] -American woman, not in acute distress,

able to lie flat in the bed.

VITAL SIGNS:  She is afebrile, pulse 81 per minute and regular, blood pressure

114/77 mmHg, and respirations 18 per minute.

NEUROLOGIC:  She is alert and oriented x 3.

HEENT:  Negative.

NECK:  Supple, no JVD, no bruit, no thyromegaly.

HEART:  PMI is slightly shifted laterally and is forcible in nature, no palpable

thrills.  Auscultation of heart reveals S1, S2 heard, regular.  Grade 1 to 2

over 6 short ejection systolic murmur is heard.  No gallop, no rub.

EXTREMITIES:  Peripheral pulses felt, bilateral 1+ pitting edema of the legs and

feet.

LUNGS:  Bibasilar crepitations.  No bronchial breathing, no wheezing.

ABDOMEN:  Soft, benign.  No organomegaly.

SKIN:  Negative.

BONE AND JOINTS:  Negative.



LABORATORY DATA:  Potassium 3.4, BUN and creatinine are within normal limits. 

Serum TSH is normal.  ProBNP and troponin as described in the history.  CBC,

platelet count within normal limits.  Chest x-ray one-view: Bibasilar

atelectasis/pulmonary edema/interstitial pneumonitis.  EKG done on 07/06/2018

revealed mild sinus tachycardia with a rate of 100 per minute, low voltage

complexes in the precordial leads,pulmonary disease pattern, possible right 
ventricular

hypertrophy, mild and diffuse nonspecific T-wave changes.



IMPRESSION:

1.  Acute on chronic congestive heart failure.

2.  Possible bronchitis.

3.  History of chronic cough.

4.  Mild hyperglycemia.

5.  Abnormal EKG.

6.  Hypokalemia.



RECOMMENDATIONS:

1.  We would increase metoprolol to 12.5 mg p.o. b.i.d.

2.  Continue IV diuretics and ACE inhibitor.

3. To keep K level around 4.

4.  Follow up echocardiogram.  Further recommendations will follow after

reviewing the echocardiogram.



Thank you again, we will follow.



Sincerely,





DD: 07/07/2018 15:04

DT: 07/08/2018 06:31

JOB# [id number]  [id number]

YARON/SANDRA TIPTON

## 2018-07-08 NOTE — EVENT NOTE
Date: 07/08/18





Pulm consult for chronic cough, cough of 5 years.  Reviewed CT of chest.  Does 

show evidence of chronic disease and volume overload.  Per report, lifelong 

nonsmoker.  Patient has improved with diuresis as directed by cards.  Will sign 

off.  She is welcome to see us or another lung specialist if her shortness of 

breath, and cough are resolved with adequate diuretic therapy.

## 2018-07-09 RX ADMIN — IPRATROPIUM BROMIDE AND ALBUTEROL SULFATE SCH AMPUL: .5; 3 SOLUTION RESPIRATORY (INHALATION) at 14:30

## 2018-07-09 RX ADMIN — Medication SCH ML: at 10:00

## 2018-07-09 RX ADMIN — HEPARIN SODIUM SCH: 5000 INJECTION, SOLUTION INTRAVENOUS; SUBCUTANEOUS at 22:29

## 2018-07-09 RX ADMIN — METOPROLOL TARTRATE SCH: 25 TABLET, FILM COATED ORAL at 09:59

## 2018-07-09 RX ADMIN — FUROSEMIDE SCH MG: 10 INJECTION, SOLUTION INTRAVENOUS at 18:40

## 2018-07-09 RX ADMIN — LOSARTAN POTASSIUM SCH: 25 TABLET, FILM COATED ORAL at 09:59

## 2018-07-09 RX ADMIN — FUROSEMIDE SCH: 10 INJECTION, SOLUTION INTRAVENOUS at 19:28

## 2018-07-09 RX ADMIN — IPRATROPIUM BROMIDE AND ALBUTEROL SULFATE SCH: .5; 3 SOLUTION RESPIRATORY (INHALATION) at 01:32

## 2018-07-09 RX ADMIN — Medication SCH ML: at 22:28

## 2018-07-09 RX ADMIN — PREDNISONE SCH MG: 20 TABLET ORAL at 09:59

## 2018-07-09 RX ADMIN — METOPROLOL TARTRATE SCH: 25 TABLET, FILM COATED ORAL at 22:29

## 2018-07-09 RX ADMIN — IPRATROPIUM BROMIDE AND ALBUTEROL SULFATE SCH AMPUL: .5; 3 SOLUTION RESPIRATORY (INHALATION) at 08:58

## 2018-07-09 RX ADMIN — IPRATROPIUM BROMIDE AND ALBUTEROL SULFATE SCH: .5; 3 SOLUTION RESPIRATORY (INHALATION) at 14:26

## 2018-07-09 RX ADMIN — FUROSEMIDE SCH MG: 10 INJECTION, SOLUTION INTRAVENOUS at 06:28

## 2018-07-09 RX ADMIN — HEPARIN SODIUM SCH: 5000 INJECTION, SOLUTION INTRAVENOUS; SUBCUTANEOUS at 06:28

## 2018-07-09 RX ADMIN — IPRATROPIUM BROMIDE AND ALBUTEROL SULFATE SCH AMPUL: .5; 3 SOLUTION RESPIRATORY (INHALATION) at 20:46

## 2018-07-09 RX ADMIN — HEPARIN SODIUM SCH: 5000 INJECTION, SOLUTION INTRAVENOUS; SUBCUTANEOUS at 15:48

## 2018-07-09 NOTE — PROGRESS NOTE
Assessment and Plan


S/p VICENTE today which showed ASD, mod to severe TR, mild pulm HTN. Will plan for 

LHC and RHC in AM with O2 sat run for further evaluation. Indications, 

potential risks and benefits of procedure reviewed with pt and she is agreeable 

to proceed. NPO after MN.  





The patient has been seen in conjunction with Dr. Moralez who agrees with the 

assessment and plan of care. 








- Patient Problems


(1) Abnormal EKG


Current Visit: Yes   Status: Acute   





(2) Biventricular congestive heart failure


Current Visit: Yes   Status: Acute   





(3) Cor pulmonale


Current Visit: Yes   Status: Chronic   





(4) Atrial septal defect


Current Visit: Yes   Status: Chronic   





(5) Tricuspid regurgitation


Current Visit: Yes   Status: Chronic   





(6) Mild pulmonary hypertension


Current Visit: Yes   Status: Chronic   





(7) Chronic cough


Current Visit: Yes   Status: Chronic   





(8) Hypoxia


Current Visit: Yes   Status: Acute   





Subjective


Date of service: 07/09/18


Principal diagnosis: HF


Interval history: 


pt for VICENTE today. 








Objective





  Last Vital Signs











Temp  97.3 F L  07/09/18 12:14


 


Pulse  85   07/09/18 13:25


 


Resp  24   07/09/18 13:25


 


BP  97/71   07/09/18 13:25


 


Pulse Ox  96   07/09/18 13:25




















- Physical Examination


General: No Apparent Distress


HEENT: Positive: Normocephaly, Mucus Membranes Moist


Neck: Positive: neck supple, trachea midline


Cardiac: Positive: Reg Rate and Rhythm, S1/S2


Lungs: Positive: Decreased Breath Sounds


Neuro: Positive: Grossly Intact


Abdomen: Positive: Soft, Active Bowel Sounds


Skin: Positive: Clear.  Negative: Rash


Musculoskeletal: No Fluid Collection, No Pain, Normal Range of Motion


Extremities: Present: upper extr. pulses, lower extr. pulses, +1 Edema





- Imaging and Cardiology


EKG: report reviewed, image reviewed


Echo: report reviewed, image reviewed





- EKG


Sinus rhythms and dysrhythmias: sinus rhythm


QRS axis and voltage: right axis deviation


Chamber hypertrophy or enlargement: right atrial enlargment, righ ventricular 

hypertro


Repolarization changes or abnormalities: nonspecific abnormality, ST segment, 

and/or T wave

## 2018-07-09 NOTE — ANESTHESIA CONSULTATION
Anesthesia Consult and Med Hx


Date of service: 07/09/18 (VICENTE)





- Airway


Anesthetic Teeth Evaluation: Poor





- Pre-Operative Health Status


ASA Pre-Surgery Classification: ASA3, ASA4


Proposed Anesthetic Plan: Sedation, Conscious





- Pulmonary


Hx Asthma: No


SOB: Yes


COPD: No


Hx Pneumonia: No





- Cardiovascular System


Hx Hypertension: No


Hx Coronary Artery Disease: No


Hx Heart Attack/AMI: No


Hx Angina: No


Hx Percutaneous Transluminal Coronary Angioplasty (PTCA): No


Hx Pacemaker: No


Hx Internal Defibrillator: No


Hx Valvular Heart Disease: No


Hx Heart Murmur: No


Hx Peripheral Vascular Disease: No





- Endocrine


Hx End Stage Renal Disease: No





- Additional Comments


Anesthesia Medical History Comments: According to medical records, patient is 

presenting with acute SOB, CHF, HYPOXIA, ABN EKG, CHRONIC COUGH, CORPULM, 

INCREASED GLUCOSE AND HYPOKOLEMIA

## 2018-07-09 NOTE — ANESTHESIA CONSULTATION
Anesthesia Consult and Med Hx


Date of service: 07/09/18





- Airway


Anesthetic Teeth Evaluation: Poor


ROM Head & Neck: Adequate


Mental/Hyoid Distance: Adequate


Mallampati Class: Class II


Intubation Access Assessment: Probably Good





- Pulmonary Exam


CTA: Yes





- Cardiac Exam


Cardiac Exam: RRR





- Pre-Operative Health Status


ASA Pre-Surgery Classification: ASA2


Proposed Anesthetic Plan: MAC





- Pulmonary


Hx Asthma: No


SOB: Yes


COPD: No


Hx Pneumonia: No





- Cardiovascular System


Hx Hypertension: No


Hx Coronary Artery Disease: No


Hx Heart Attack/AMI: No


Hx Angina: No


Hx Percutaneous Transluminal Coronary Angioplasty (PTCA): No


Hx Pacemaker: No


Hx Internal Defibrillator: No


Hx Valvular Heart Disease: No


Hx Heart Murmur: No


Hx Peripheral Vascular Disease: No





- Endocrine


Hx End Stage Renal Disease: No





- Additional Comments


Anesthesia Medical History Comments: According to medical records, patient is 

presenting with acute SOB, CHF, HYPOXIA, ABN EKG, CHRONIC COUGH, CORPULM, 

INCREASED GLUCOSE AND HYPOKOLEMIA

## 2018-07-09 NOTE — PROGRESS NOTE
Assessment and Plan


Assessment and plan: 


Acute on chronic systolic CHF.


Patient was just diagnosed with CHF about 3 months ago and has had multiple 

hospitalizations. She ran out of lasix


Continue Lasix 40 iv bid


cardiology consulted,


Echo shows EF 45-50%


VICENTE done today shows ASD, mild pulm hypertension.


Plan is for cardiac cath tomorrow





Prediabetes.


Started diabetic diet.


Fingerstick glucose  Qac and hs





Hypokalemia.


Now resolved.


Will discontinue Potassium





DVT prophylaxis with Heparin subQ





Full code status








History


Interval history: 


Feels better,


Less shortness of breath


less swelling both legs


VICENTE done today








Hospitalist Physical





- Physical exam


Narrative exam: 


Gen : Not in acute distress, sitting up in bed


HEENT:Normocephalic, atraumatic


Neck: supple, JVD presented


Lungs: Biltateral crackles, no rhonchi


Heart :S1 and S2 reg, no murmurs, rubs or gallop


Abd:soft, non tender, non distended, normal bowel sounds


Ext: Bilateral pitting pedal edema, no clubbing, no cyanosis, 


Neuro: Awake,alert,oriented x 3, no focal signs


Psych:normal mood











- Constitutional


Vitals: 


 











Temp Pulse Resp BP Pulse Ox


 


 97.3 F L  108 H  20   97/71   96 


 


 07/09/18 12:14  07/09/18 14:40  07/09/18 14:40  07/09/18 13:25  07/09/18 13:25











General appearance: Present: no acute distress





Results





- Labs


CBC & Chem 7: 


 07/06/18 15:09





 07/08/18 07:30


Labs: 


 Laboratory Last Values











WBC  6.3 K/mm3 (4.5-11.0)   07/06/18  15:09    


 


RBC  4.49 M/mm3 (3.65-5.03)   07/06/18  15:09    


 


Hgb  13.2 gm/dl (10.1-14.3)   07/06/18  15:09    


 


Hct  40.4 % (30.3-42.9)   07/06/18  15:09    


 


MCV  90 fl (79-97)   07/06/18  15:09    


 


MCH  29 pg (28-32)   07/06/18  15:09    


 


MCHC  33 % (30-34)   07/06/18  15:09    


 


RDW  15.9 % (13.2-15.2)  H  07/06/18  15:09    


 


Plt Count  289 K/mm3 (140-440)   07/06/18  15:09    


 


Lymph % (Auto)  17.5 % (13.4-35.0)   07/06/18  15:09    


 


Mono % (Auto)  7.1 % (0.0-7.3)   07/06/18  15:09    


 


Eos % (Auto)  1.4 % (0.0-4.3)   07/06/18  15:09    


 


Baso % (Auto)  1.0 % (0.0-1.8)   07/06/18  15:09    


 


Lymph #  1.1 K/mm3 (1.2-5.4)  L  07/06/18  15:09    


 


Mono #  0.5 K/mm3 (0.0-0.8)   07/06/18  15:09    


 


Eos #  0.1 K/mm3 (0.0-0.4)   07/06/18  15:09    


 


Baso #  0.1 K/mm3 (0.0-0.1)   07/06/18  15:09    


 


Seg Neutrophils %  73.0 % (40.0-70.0)  H  07/06/18  15:09    


 


Seg Neutrophils #  4.6 K/mm3 (1.8-7.7)   07/06/18  15:09    


 


PT  15.4 Sec. (12.2-14.9)  H  07/06/18  15:09    


 


INR  1.16  (0.87-1.13)  H  07/06/18  15:09    


 


APTT  26.7 Sec. (24.2-36.6)   07/06/18  15:09    


 


Sodium  138 mmol/L (137-145)   07/08/18  07:30    


 


Potassium  4.8 mmol/L (3.6-5.0)  D 07/08/18  07:30    


 


Chloride  99.8 mmol/L ()   07/08/18  07:30    


 


Carbon Dioxide  22 mmol/L (22-30)   07/08/18  07:30    


 


Anion Gap  21 mmol/L  07/08/18  07:30    


 


BUN  19 mg/dL (7-17)  H  07/08/18  07:30    


 


Creatinine  0.8 mg/dL (0.7-1.2)   07/08/18  07:30    


 


Estimated GFR  > 60 ml/min  07/08/18  07:30    


 


BUN/Creatinine Ratio  24 %  07/08/18  07:30    


 


Glucose  114 mg/dL ()  H  07/08/18  07:30    


 


Hemoglobin A1c  6.4 % (4-6)  H  07/07/18  04:19    


 


Calcium  9.1 mg/dL (8.4-10.2)   07/08/18  07:30    


 


Troponin T  0.021 ng/mL (0.00-0.029)   07/06/18  15:09    


 


NT-Pro-B Natriuret Pep  2424 pg/mL (0-900)  H  07/06/18  15:09    


 


TSH  1.260 mlU/mL (0.270-4.200)   07/07/18  04:19

## 2018-07-10 LAB
APTT BLD: 21.5 SEC. (ref 24.2–36.6)
BASOPHILS # (AUTO): 0.1 K/MM3 (ref 0–0.1)
BASOPHILS NFR BLD AUTO: 0.8 % (ref 0–1.8)
BUN SERPL-MCNC: 19 MG/DL (ref 7–17)
BUN/CREAT SERPL: 21 %
CALCIUM SERPL-MCNC: 9 MG/DL (ref 8.4–10.2)
EOSINOPHIL # BLD AUTO: 0 K/MM3 (ref 0–0.4)
EOSINOPHIL NFR BLD AUTO: 0.6 % (ref 0–4.3)
HCT VFR BLD CALC: 41.6 % (ref 30.3–42.9)
HEMOLYSIS INDEX: 69
HGB BLD-MCNC: 13.9 GM/DL (ref 10.1–14.3)
INR PPP: 1.13 (ref 0.87–1.13)
LYMPHOCYTES # BLD AUTO: 1.7 K/MM3 (ref 1.2–5.4)
LYMPHOCYTES NFR BLD AUTO: 24.1 % (ref 13.4–35)
MCH RBC QN AUTO: 30 PG (ref 28–32)
MCHC RBC AUTO-ENTMCNC: 34 % (ref 30–34)
MCV RBC AUTO: 89 FL (ref 79–97)
MONOCYTES # (AUTO): 0.3 K/MM3 (ref 0–0.8)
MONOCYTES % (AUTO): 4.5 % (ref 0–7.3)
PLATELET # BLD: 198 K/MM3 (ref 140–440)
RBC # BLD AUTO: 4.65 M/MM3 (ref 3.65–5.03)

## 2018-07-10 PROCEDURE — 4A023N8 MEASUREMENT OF CARDIAC SAMPLING AND PRESSURE, BILATERAL, PERCUTANEOUS APPROACH: ICD-10-PCS | Performed by: INTERNAL MEDICINE

## 2018-07-10 PROCEDURE — B2151ZZ FLUOROSCOPY OF LEFT HEART USING LOW OSMOLAR CONTRAST: ICD-10-PCS | Performed by: INTERNAL MEDICINE

## 2018-07-10 PROCEDURE — B2111ZZ FLUOROSCOPY OF MULTIPLE CORONARY ARTERIES USING LOW OSMOLAR CONTRAST: ICD-10-PCS | Performed by: INTERNAL MEDICINE

## 2018-07-10 RX ADMIN — SPIRONOLACTONE SCH MG: 25 TABLET ORAL at 13:27

## 2018-07-10 RX ADMIN — METOPROLOL TARTRATE SCH: 25 TABLET, FILM COATED ORAL at 13:30

## 2018-07-10 RX ADMIN — HEPARIN SODIUM SCH: 5000 INJECTION, SOLUTION INTRAVENOUS; SUBCUTANEOUS at 06:20

## 2018-07-10 RX ADMIN — PREDNISONE SCH MG: 20 TABLET ORAL at 13:28

## 2018-07-10 RX ADMIN — Medication SCH ML: at 13:30

## 2018-07-10 RX ADMIN — FUROSEMIDE SCH MG: 10 INJECTION, SOLUTION INTRAVENOUS at 06:18

## 2018-07-10 RX ADMIN — Medication SCH ML: at 22:43

## 2018-07-10 RX ADMIN — HEPARIN SODIUM SCH: 5000 INJECTION, SOLUTION INTRAVENOUS; SUBCUTANEOUS at 13:30

## 2018-07-10 RX ADMIN — METOPROLOL TARTRATE SCH: 25 TABLET, FILM COATED ORAL at 22:44

## 2018-07-10 RX ADMIN — HEPARIN SODIUM SCH: 5000 INJECTION, SOLUTION INTRAVENOUS; SUBCUTANEOUS at 22:43

## 2018-07-10 RX ADMIN — LOSARTAN POTASSIUM SCH: 25 TABLET, FILM COATED ORAL at 13:29

## 2018-07-10 NOTE — CARDIAC CATHERIZATION REPORT
LEFT HEART CATHETERIZATION



INDICATION FOR PROCEDURE:  A 62-year-old -American female who was

admitted with shortness of breath and right-sided heart failure.  Echocardiogram

showed ejection fraction of 45% along with transesophageal echocardiogram

suggesting PFO versus ASD with markedly dilated right atrium and right

ventricle.  Hence, a cardiac catheterization being performed for evaluation of

her congestive heart failure and right-sided heart enlargement.



The patient is aware of the procedure, potential complications, and alternatives

of therapy available.



DESCRIPTION OF PROCEDURE:  The patient was brought to the catheterization

laboratory in a fasting condition.  The patient's oxygen saturations were on

below 90s even on 2-3 liters of oxygen.  The patient after being evaluated for

moderate sedation was sedated with IV Versed 1 mg and 25 mg of fentanyl.  Local

anesthesia was given in the right groin.  Initially, right femoral artery

puncture was made using 5-Guinean micropuncture needle.  Using 5-Guinean

multipurpose catheter, angiograms of the right coronary artery and left coronary

artery were obtained and also left ventriculogram was performed using power

injector 24 mL at 8 mL per second.  After obtaining his right heart

catheterization, right femoral vein puncture was made under local anesthesia and

7-[place]inean Trenton-Kenzie catheter was advanced under fluoroscopy into the pulmonary

artery without difficulty; however, could not further advance with the catheter.

 Subsequently, a 5-[place]ine[place] JR4 catheter with the help of J-wire was used to

obtain the oxygen saturations in multiple areas on the right side.  This was

done because the flotation catheter could not be advanced into the PA even with

the help of a wire.  Hence, a 5F JR4 catheter was used to obtain the pressures 
and

O2 saturations.  O2 saturation was obtained in the left pulmonary artery and

main pulmonary artery, right ventricle, superior vena cava in addition to mid

right atrium and inferior vena cava.  Also, at the same time arterial sample was

obtained from the right femoral artery for O2 saturations.  At the end of the

procedure, angiograms of the right femoral artery were obtained when it was felt

appropriate.  A 6-[place]inean ProGlide closure device was applied with good

hemostasis.  No untoward complications were noted.  Following findings were

noted.  Aortic pressure is 103/71, left ventricular pressure is 102/10-12. 

Estimated ejection fraction 30-35%.  Right atrial pressure is 22/21.  Right

ventricular pressure is 59/20.  Pulmonary artery pressure is 55/27 with a mean

of 38.  Cardiac output was 3.31 liters.  Oxygen saturations are as follows: 

Right femoral artery saturation was 80%.  IVC saturation is 46%, mid right

atrial saturation is 40%.  SVC is 38%.  Right ventricle is 40% and pulmonary

artery saturation was 42%.



Right coronary dominant vessel shows sluggish flow, otherwise angiographically

smooth and normal.  Left coronary artery arises normally from left coronary

cusp.  Left main, LAD, and circumflex artery and its branch are angiographically

smooth and normal with sluggish flow.  Left ventriculogram done in [place]

projection shows mildly dilated left ventricle with moderate diffuse

hypokinesis, ejection fraction in the 30-35%.



FINAL IMPRESSION:

1.  Dilated cardiomyopathy with moderate LV dysfunction, ejection fraction

30-35%.

2.  Normal coronary anatomy.

3.  Moderate pulmonary hypertension with a pulmonary artery pressure of 55/27

with a mean of 38.  Right atrial pressures mean of 22 mmHg.



At this time, there is no significant step-off of O2 saturations in the right

side to indicate significant left to right shunt.  No significant left to right

shunt was noted.  At this time, considering normal coronary anatomy with

moderate LV dysfunction, we will continue medical therapy.  Also, the patient

has moderate pulmonary hypertension.  Etiology is not clear.  At the point of

this examination, the patient's end-diastolic pressure is normal in the range of

10-12 mmHg; however, the patient is diuresed well.  Needs further followup of

her pulmonary hypertension.



The patient tolerated the procedure well.  Good hemostasis was achieved in the

right groin.  Arterial puncture was closed with ProGlide device with good

hemostasis and manual pressure was applied in the right femoral vein.  No

untoward complications were noted.  The patient was sedated with IV Versed and

fentanyl.  Her oxygen saturations have been low and monitored throughout.  Her

sedation started at 10:50 a.m. and ended at 11:56 a.m. and continuous EKG

monitoring O2 saturation and blood pressure monitoring was done.  The patient

was transferred to the room in stable condition.





DD: 07/10/2018 12:14

DT: 07/10/2018 12:53

JOB# [id number]  [id number]

RADHA/SANDRA TIPTON

## 2018-07-10 NOTE — PROGRESS NOTE
Assessment and Plan





/Acute on chronic systolic CHF.


Patient was just diagnosed with CHF about 3 months ago and has had multiple 

hospitalizations. She ran out of lasix


Continue Lasix 40 iv bid


cardiology consulted,


Echo shows EF 45-50%


VICENTE done 07/09/18 shows ASD, mild pulm hypertension.


s/p cardiac cath today





/Prediabetes.


Started diabetic diet.


Fingerstick glucose  Qac and hs





/Hypokalemia.


Now resolved.





DVT prophylaxis with Heparin subQ





Full code status








Hospitalist Physical


Gen : Not in acute distress, sitting up in bed


HEENT:Normocephalic, atraumatic


Neck: supple, JVD presented


Lungs: Biltateral crackles, no rhonchi


Heart :S1 and S2 reg, no murmurs, rubs or gallop


Abd:soft, non tender, non distended, normal bowel sounds


Ext: Bilateral pitting pedal edema, no clubbing, no cyanosis, 


Neuro: Awake,alert,oriented x 3, no focal signs


Psych:normal mood











Subjective


Date of service: 07/10/18


Principal diagnosis: HF


Interval history: 





Feels better,


Less shortness of breath


less swelling both legs


Cardiac cath done today








Objective





- Constitutional


Vitals: 


 Vital Signs - 12hr











  07/10/18 07/10/18 07/10/18





  07:35 07:46 10:00


 


Temperature 97.4 F L  


 


Pulse Rate 92 H  107 H


 


Respiratory 16  20





Rate   


 


Blood Pressure   


 


Blood Pressure 103/75  





[Left]   


 


O2 Sat by Pulse 92 98 





Oximetry   














  07/10/18 07/10/18 07/10/18





  13:17 13:27 16:05


 


Temperature   98.1 F


 


Pulse Rate 89  93 H


 


Respiratory   18





Rate   


 


Blood Pressure 95/65 95/65 88/60


 


Blood Pressure   





[Left]   


 


O2 Sat by Pulse 92  94





Oximetry   














  07/10/18 07/10/18 07/10/18





  16:07 16:08 16:32


 


Temperature   98.3 F


 


Pulse Rate 102 H 102 H 94 H


 


Respiratory   18





Rate   


 


Blood Pressure 89/62 103/70 96/72


 


Blood Pressure   





[Left]   


 


O2 Sat by Pulse 94 91 91





Oximetry   














- Labs


CBC & Chem 7: 


 07/10/18 05:01





 07/10/18 05:01


Labs: 


 Abnormal lab results











  07/10/18 07/10/18 07/10/18 Range/Units





  05:01 05:01 05:01 


 


RDW  16.1 H    (13.2-15.2)  %


 


PT   15.1 H   (12.2-14.9)  Sec.


 


APTT   21.5 L   (24.2-36.6)  Sec.


 


Chloride    97.4 L  ()  mmol/L


 


BUN    19 H  (7-17)  mg/dL


 


Glucose    125 H  ()  mg/dL


 


POC Glucose     ()  














  07/10/18 07/10/18 Range/Units





  06:53 16:44 


 


RDW    (13.2-15.2)  %


 


PT    (12.2-14.9)  Sec.


 


APTT    (24.2-36.6)  Sec.


 


Chloride    ()  mmol/L


 


BUN    (7-17)  mg/dL


 


Glucose    ()  mg/dL


 


POC Glucose  107 H  118 H  ()

## 2018-07-10 NOTE — PROGRESS NOTE
Assessment and Plan


S/p LHC & RHC today which showed NO ASD, no O2 sat step-up, EF 35%, normal 

coronaries, mod pulmonary HTN. 


Hold IV lasix. Cont lopressor and losartan. Initiate aldactone. Cont to 

monitor. 





The patient has been seen in conjunction with Dr. Mroalez who agrees with the 

assessment and plan of care. 








- Patient Problems


(1) Biventricular congestive heart failure


Current Visit: Yes   Status: Acute   





(2) Nonischemic cardiomyopathy


Current Visit: Yes   Status: Chronic   





(3) Cor pulmonale


Current Visit: Yes   Status: Chronic   





(4) Tricuspid regurgitation


Current Visit: Yes   Status: Chronic   





(5) Pulmonary HTN


Current Visit: Yes   Status: Chronic   





(6) Abnormal EKG


Current Visit: Yes   Status: Acute   





(7) Chronic cough


Current Visit: Yes   Status: Chronic   





(8) Hypoxia


Current Visit: Yes   Status: Acute   





Subjective


Date of service: 07/10/18


Principal diagnosis: HF


Interval history: 


pt for LHC & RHC today. no current complaints. 








Objective





  Last Vital Signs











Temp  97.4 F L  07/10/18 07:35


 


Pulse  107 H  07/10/18 10:00


 


Resp  20   07/10/18 10:00


 


BP  103/75   07/10/18 07:35


 


Pulse Ox  98   07/10/18 07:46




















- Physical Examination


General: No Apparent Distress


HEENT: Positive: Normocephaly, Mucus Membranes Moist


Neck: Positive: neck supple, trachea midline


Cardiac: Positive: Reg Rate and Rhythm, S1/S2


Lungs: Positive: Decreased Breath Sounds


Neuro: Positive: Grossly Intact


Abdomen: Positive: Soft, Active Bowel Sounds


Skin: Positive: Clear.  Negative: Rash


Musculoskeletal: No Fluid Collection, No Pain, Normal Range of Motion


Extremities: Present: upper extr. pulses, lower extr. pulses, +1 Edema





- Labs and Meds


 Coagulation











  07/10/18 Range/Units





  05:01 


 


PT  15.1 H  (12.2-14.9)  Sec.


 


INR  1.13  (0.87-1.13)  


 


APTT  21.5 L  (24.2-36.6)  Sec.








 CBC











  07/10/18 Range/Units





  05:01 


 


WBC  7.0  (4.5-11.0)  K/mm3


 


RBC  4.65  (3.65-5.03)  M/mm3


 


Hgb  13.9  (10.1-14.3)  gm/dl


 


Hct  41.6  (30.3-42.9)  %


 


Plt Count  198  (140-440)  K/mm3


 


Lymph #  1.7  (1.2-5.4)  K/mm3


 


Mono #  0.3  (0.0-0.8)  K/mm3


 


Eos #  0.0  (0.0-0.4)  K/mm3


 


Baso #  0.1  (0.0-0.1)  K/mm3








 Comprehensive Metabolic Panel











  07/10/18 Range/Units





  05:01 


 


Sodium  138  (137-145)  mmol/L


 


Potassium  4.3  (3.6-5.0)  mmol/L


 


Chloride  97.4 L  ()  mmol/L


 


Carbon Dioxide  24  (22-30)  mmol/L


 


BUN  19 H  (7-17)  mg/dL


 


Creatinine  0.9  (0.7-1.2)  mg/dL


 


Glucose  125 H  ()  mg/dL


 


Calcium  9.0  (8.4-10.2)  mg/dL














- Imaging and Cardiology


EKG: report reviewed, image reviewed


Echo: report reviewed, image reviewed





- EKG


Sinus rhythms and dysrhythmias: sinus rhythm


QRS axis and voltage: right axis deviation


Chamber hypertrophy or enlargement: right atrial enlargment, righ ventricular 

hypertro


Repolarization changes or abnormalities: nonspecific abnormality, ST segment, 

and/or T wave

## 2018-07-11 VITALS — SYSTOLIC BLOOD PRESSURE: 108 MMHG | DIASTOLIC BLOOD PRESSURE: 81 MMHG

## 2018-07-11 RX ADMIN — HEPARIN SODIUM SCH: 5000 INJECTION, SOLUTION INTRAVENOUS; SUBCUTANEOUS at 14:28

## 2018-07-11 RX ADMIN — HEPARIN SODIUM SCH: 5000 INJECTION, SOLUTION INTRAVENOUS; SUBCUTANEOUS at 06:57

## 2018-07-11 RX ADMIN — METOPROLOL TARTRATE SCH: 25 TABLET, FILM COATED ORAL at 11:00

## 2018-07-11 RX ADMIN — PREDNISONE SCH: 20 TABLET ORAL at 11:14

## 2018-07-11 RX ADMIN — Medication SCH ML: at 11:15

## 2018-07-11 RX ADMIN — SPIRONOLACTONE SCH: 25 TABLET ORAL at 11:00

## 2018-07-11 RX ADMIN — LOSARTAN POTASSIUM SCH: 25 TABLET, FILM COATED ORAL at 11:00

## 2018-07-11 NOTE — PROGRESS NOTE
Assessment and Plan


Currently stable cardiac status. Pt may discharge home from cardiology 

standpoint. 


Cont lopressor, losartan, aldactone. 


Follow up in our [place] office with Dr. Moralez on 7/18/2018 @ 3:00PM. 





The patient has been seen in conjunction with Dr. Moralez who agrees with the 

assessment and plan of care. 








- Patient Problems


(1) Biventricular congestive heart failure


Current Visit: Yes   Status: Acute   





(2) Nonischemic cardiomyopathy


Current Visit: Yes   Status: Chronic   





(3) Cor pulmonale


Current Visit: Yes   Status: Chronic   





(4) Tricuspid regurgitation


Current Visit: Yes   Status: Chronic   





(5) Pulmonary HTN


Current Visit: Yes   Status: Chronic   





(6) Abnormal EKG


Current Visit: Yes   Status: Acute   





(7) PFO (patent foramen ovale)


Current Visit: Yes   Status: Acute   





(8) Chronic cough


Current Visit: Yes   Status: Chronic   





(9) Hypoxia


Current Visit: Yes   Status: Acute   





Subjective


Date of service: 07/11/18


Principal diagnosis: HF


Interval history: 


pt sitting up at bedside, no current cardiac complaints.  








Objective





  Last Vital Signs











Temp  97.9 F   07/11/18 08:15


 


Pulse  74   07/11/18 10:00


 


Resp  20   07/11/18 08:15


 


BP  100/74   07/11/18 08:15


 


Pulse Ox  99   07/11/18 07:16




















- Physical Examination


General: No Apparent Distress


HEENT: Positive: PERRL, Normocephaly, Mucus Membranes Moist


Neck: Positive: neck supple, trachea midline


Cardiac: Positive: Reg Rate and Rhythm, S1/S2


Neuro: Positive: Grossly Intact


Abdomen: Positive: Soft, Active Bowel Sounds


Skin: Positive: Clear.  Negative: Rash


Incision: Cardiac Cath Site (right femoral c/d/i with no evidence of bleeding 

or hematoma)


Musculoskeletal: No Fluid Collection, No Pain, Normal Range of Motion


Extremities: Present: upper extr. pulses, lower extr. pulses.  Absent: edema





- Imaging and Cardiology


EKG: report reviewed, image reviewed


Echo: report reviewed, image reviewed





- Telemetry


EKG Rhythm: Sinus Rhythm





- EKG


Sinus rhythms and dysrhythmias: sinus rhythm


QRS axis and voltage: right axis deviation


Chamber hypertrophy or enlargement: right atrial enlargment, righ ventricular 

hypertro


Repolarization changes or abnormalities: nonspecific abnormality, ST segment, 

and/or T wave

## 2018-07-11 NOTE — DISCHARGE SUMMARY
Providers





- Providers


Date of Admission: 


07/06/18 21:39





Date of discharge: 07/11/18


Attending physician: 


KATHY SALAZAR





 





07/06/18 21:50


Consult to Physician [CONS] Routine 


   Comment: 


   Consulting Provider: NARDA TOUSSAINT


   Physician Instructions: 


   Reason For Exam: chf





07/10/18 12:35


Consult to Cardiac Rehabilitation [CONS] Routine 


   Reason For Exam: Cardiac Rehab Evaluation











Primary care physician: 


PRIMARY CARE MD








Hospitalization


Condition: Fair


Pertinent studies: 





CXR


CT chest


2dcho


VICENTE


cardiac cath








Hospital course: 





Discharge diagnosis and management:


/Acute on chronic systolic CHF.


Patient was just diagnosed with CHF about 3 months ago and has had multiple 

hospitalizations. She ran out of lasix


Continue Lasix 40 iv bid


cardiology was consulted,


Echo shows EF 45-50%, VICENTE done 07/09/18 shows ASD, mild pulm hypertension.


s/p cardiac cath revealed no ASD





/Prediabetes.


Started diabetic diet.


Fingerstick glucose  Qac and hs





/Hypokalemia.


Now resolved.





DVT prophylaxis with Heparin subQ





Full code status








Hospitalist Physical


Gen : Not in acute distress, sitting up in bed


HEENT:Normocephalic, atraumatic


Neck: supple, JVD presented


Lungs: Biltateral crackles, no rhonchi


Heart :S1 and S2 reg, no murmurs, rubs or gallop


Abd:soft, non tender, non distended, normal bowel sounds


Ext: Bilateral pitting pedal edema, no clubbing, no cyanosis, 


Neuro: Awake,alert,oriented x 3, no focal signs


Psych:normal mood








Disposition: DC-01 TO HOME OR SELFCARE


Time spent for discharge: 32 minutes





Core Measure Documentation





- Palliative Care


Palliative Care/ Comfort Measures: Not Applicable





- Core Measures


Any of the following diagnoses?: none





Exam





- Constitutional


Vitals: 


 











Temp Pulse Resp BP Pulse Ox


 


 97.9 F   88   18   99/73   99 


 


 07/11/18 11:06  07/11/18 11:06  07/11/18 11:06  07/11/18 11:06  07/11/18 11:06














Plan


Activity: advance as tolerated


Weight Bearing Status: Non-Weight Bearing


Diet: low fat, low salt


Follow up with: 


ANDIE VASQUES MD [Staff Physician] - 7 Days (Follow up in our 

[place] office with Dr. Moralez on 7/18/2018 @ 3:00PM. )


PRIMARY CARE,MD [Primary Care Provider] - 3-5 Days


Prescriptions: 


Losartan [Cozaar] 25 mg PO QDAY #30 tablet


Metoprolol [Lopressor TAB] 12.5 mg PO BID #30 tablet


Spironolactone [Aldactone] 12.5 mg PO QDAY #30 tablet

## 2018-08-31 ENCOUNTER — HOSPITAL ENCOUNTER (INPATIENT)
Dept: HOSPITAL 5 - ED | Age: 63
LOS: 16 days | DRG: 286 | End: 2018-09-16
Attending: INTERNAL MEDICINE | Admitting: INTERNAL MEDICINE
Payer: COMMERCIAL

## 2018-08-31 DIAGNOSIS — I48.0: ICD-10-CM

## 2018-08-31 DIAGNOSIS — G45.9: ICD-10-CM

## 2018-08-31 DIAGNOSIS — I50.82: ICD-10-CM

## 2018-08-31 DIAGNOSIS — R55: ICD-10-CM

## 2018-08-31 DIAGNOSIS — I25.10: ICD-10-CM

## 2018-08-31 DIAGNOSIS — I50.23: ICD-10-CM

## 2018-08-31 DIAGNOSIS — Z98.51: ICD-10-CM

## 2018-08-31 DIAGNOSIS — Z79.82: ICD-10-CM

## 2018-08-31 DIAGNOSIS — Q21.1: ICD-10-CM

## 2018-08-31 DIAGNOSIS — Z82.49: ICD-10-CM

## 2018-08-31 DIAGNOSIS — Z88.0: ICD-10-CM

## 2018-08-31 DIAGNOSIS — I27.81: ICD-10-CM

## 2018-08-31 DIAGNOSIS — J96.21: ICD-10-CM

## 2018-08-31 DIAGNOSIS — I11.0: Primary | ICD-10-CM

## 2018-08-31 DIAGNOSIS — E87.6: ICD-10-CM

## 2018-08-31 DIAGNOSIS — I27.20: ICD-10-CM

## 2018-08-31 DIAGNOSIS — E44.0: ICD-10-CM

## 2018-08-31 DIAGNOSIS — I07.1: ICD-10-CM

## 2018-08-31 DIAGNOSIS — I95.9: ICD-10-CM

## 2018-08-31 DIAGNOSIS — Z79.899: ICD-10-CM

## 2018-08-31 DIAGNOSIS — I42.9: ICD-10-CM

## 2018-08-31 PROCEDURE — 84100 ASSAY OF PHOSPHORUS: CPT

## 2018-08-31 PROCEDURE — 94002 VENT MGMT INPAT INIT DAY: CPT

## 2018-08-31 PROCEDURE — C9113 INJ PANTOPRAZOLE SODIUM, VIA: HCPCS

## 2018-08-31 PROCEDURE — 94660 CPAP INITIATION&MGMT: CPT

## 2018-08-31 PROCEDURE — 71045 X-RAY EXAM CHEST 1 VIEW: CPT

## 2018-08-31 PROCEDURE — 96361 HYDRATE IV INFUSION ADD-ON: CPT

## 2018-08-31 PROCEDURE — 93451 RIGHT HEART CATH: CPT

## 2018-08-31 PROCEDURE — 87070 CULTURE OTHR SPECIMN AEROBIC: CPT

## 2018-08-31 PROCEDURE — 85379 FIBRIN DEGRADATION QUANT: CPT

## 2018-08-31 PROCEDURE — 82803 BLOOD GASES ANY COMBINATION: CPT

## 2018-08-31 PROCEDURE — 80061 LIPID PANEL: CPT

## 2018-08-31 PROCEDURE — 87116 MYCOBACTERIA CULTURE: CPT

## 2018-08-31 PROCEDURE — 85025 COMPLETE CBC W/AUTO DIFF WBC: CPT

## 2018-08-31 PROCEDURE — C1769 GUIDE WIRE: HCPCS

## 2018-08-31 PROCEDURE — C1894 INTRO/SHEATH, NON-LASER: HCPCS

## 2018-08-31 PROCEDURE — 71250 CT THORAX DX C-: CPT

## 2018-08-31 PROCEDURE — 94760 N-INVAS EAR/PLS OXIMETRY 1: CPT

## 2018-08-31 PROCEDURE — 31500 INSERT EMERGENCY AIRWAY: CPT

## 2018-08-31 PROCEDURE — 86021 WBC ANTIBODY IDENTIFICATION: CPT

## 2018-08-31 PROCEDURE — 76604 US EXAM CHEST: CPT

## 2018-08-31 PROCEDURE — 93005 ELECTROCARDIOGRAM TRACING: CPT

## 2018-08-31 PROCEDURE — 93010 ELECTROCARDIOGRAM REPORT: CPT

## 2018-08-31 PROCEDURE — 85730 THROMBOPLASTIN TIME PARTIAL: CPT

## 2018-08-31 PROCEDURE — 36600 WITHDRAWAL OF ARTERIAL BLOOD: CPT

## 2018-08-31 PROCEDURE — 80074 ACUTE HEPATITIS PANEL: CPT

## 2018-08-31 PROCEDURE — A9558 XE133 XENON 10MCI: HCPCS

## 2018-08-31 PROCEDURE — 78582 LUNG VENTILAT&PERFUS IMAGING: CPT

## 2018-08-31 PROCEDURE — 94003 VENT MGMT INPAT SUBQ DAY: CPT

## 2018-08-31 PROCEDURE — 83880 ASSAY OF NATRIURETIC PEPTIDE: CPT

## 2018-08-31 PROCEDURE — 74018 RADEX ABDOMEN 1 VIEW: CPT

## 2018-08-31 PROCEDURE — 96372 THER/PROPH/DIAG INJ SC/IM: CPT

## 2018-08-31 PROCEDURE — 92950 HEART/LUNG RESUSCITATION CPR: CPT

## 2018-08-31 PROCEDURE — 85610 PROTHROMBIN TIME: CPT

## 2018-08-31 PROCEDURE — 82962 GLUCOSE BLOOD TEST: CPT

## 2018-08-31 PROCEDURE — 70450 CT HEAD/BRAIN W/O DYE: CPT

## 2018-08-31 PROCEDURE — 82553 CREATINE MB FRACTION: CPT

## 2018-08-31 PROCEDURE — 87040 BLOOD CULTURE FOR BACTERIA: CPT

## 2018-08-31 PROCEDURE — 93970 EXTREMITY STUDY: CPT

## 2018-08-31 PROCEDURE — 80048 BASIC METABOLIC PNL TOTAL CA: CPT

## 2018-08-31 PROCEDURE — 82164 ANGIOTENSIN I ENZYME TEST: CPT

## 2018-08-31 PROCEDURE — 83735 ASSAY OF MAGNESIUM: CPT

## 2018-08-31 PROCEDURE — A9540 TC99M MAA: HCPCS

## 2018-08-31 PROCEDURE — 86038 ANTINUCLEAR ANTIBODIES: CPT

## 2018-08-31 PROCEDURE — 36415 COLL VENOUS BLD VENIPUNCTURE: CPT

## 2018-08-31 PROCEDURE — 80053 COMPREHEN METABOLIC PANEL: CPT

## 2018-08-31 PROCEDURE — 87205 SMEAR GRAM STAIN: CPT

## 2018-08-31 PROCEDURE — 84484 ASSAY OF TROPONIN QUANT: CPT

## 2018-08-31 PROCEDURE — 82550 ASSAY OF CK (CPK): CPT

## 2018-08-31 NOTE — EMERGENCY DEPARTMENT REPORT
HPI





- General


Chief Complaint: Dyspnea/Respdistress


Time Seen by Provider: 08/31/18 23:12





- HPI


HPI: 





63-year-old female presents to the emergency department with complaint of 

shortness of breath and bilateral lower extremity swelling.  Patient was found 

to have a pulse ox of 80% on room air in triage.  It went up to the low 90s 

with 4 L via nasal cannula.  She denies any chest pain, fever, nausea, vomiting

, back pain or diaphoresis.  The patient does have a past medical history of 

CHF for which she takes diuretics.  She is to be on Lasix but was switched to 

something that "starts with a G." however the diuretic has not been working for 

her.  She has also been dealing with a mixed dry and productive cough.  

Secondarily, the patient complains of some redness and firmness to the mid 

upper abdomen that has been going on since the patient was admitted to [place] back in February.  She does not have a primary care physician.  She 

follows with [facility] cardiology.  She had a cardiac catheterization done 

back in July that showed dilated cardiomyopathy with a ejection fraction of 30-

35%.  No recent travel or sick contacts at home.





ED Past Medical Hx





- Past Medical History


Previous Medical History?: Yes


Hx Hypertension: No


Hx Heart Attack/AMI: No


Hx Congestive Heart Failure: Yes


Hx Diabetes: No


Hx Deep Vein Thrombosis: No


Hx Asthma: No


Hx COPD: No





- Surgical History


Past Surgical History?: Yes


Hx Coronary Stent: No


Hx Pacemaker: No


Hx Internal Defibrillator: No





- Social History


Smoking Status: Never Smoker


Substance Use Type: None





- Medications


Home Medications: 


 Home Medications











 Medication  Instructions  Recorded  Confirmed  Last Taken  Type


 


Aspirin EC [Aspirin Enteric Coated 81 mg PO QDAY 07/08/18 07/08/18 Unknown 

History





TAB]     


 


Furosemide [Lasix TAB] 40 mg PO QDAY #30 tablet 07/11/18  Unknown Rx


 


Losartan [Cozaar] 25 mg PO QDAY #30 tablet 07/11/18  Unknown Rx


 


Metoprolol [Lopressor TAB] 12.5 mg PO BID #30 tablet 07/11/18  Unknown Rx


 


Spironolactone [Aldactone] 12.5 mg PO QDAY #30 tablet 07/11/18  Unknown Rx














ED Review of Systems


ROS: 


Stated complaint: SWELLING BOTH LEGS


Other details as noted in HPI





Comment: All other systems reviewed and negative


Constitutional: denies: chills, fever


Eyes: denies: eye pain, eye discharge, vision change


ENT: denies: ear pain, throat pain


Respiratory: cough, shortness of breath


Cardiovascular: edema.  denies: chest pain


Genitourinary: denies: urgency, dysuria, discharge


Musculoskeletal: denies: back pain, joint swelling, arthralgia


Skin: rash, change in color


Neurological: denies: headache, weakness, paresthesias





Physical Exam





- Physical Exam


Vital Signs: 


 Vital Signs











  08/31/18 08/31/18





  23:04 23:20


 


Temperature 97.7 F 


 


Pulse Rate 104 H 


 


Respiratory 17 





Rate  


 


Blood Pressure 95/72 


 


O2 Sat by Pulse 83 L 97





Oximetry  











Physical Exam: 





GENERAL: The patient is well-developed well-nourished.


HENT: Normocephalic.  Atraumatic.    Patient has moist mucous membranes.


EYES: Extraocular motions are intact.  Pupils equal reactive to light 

bilaterally.


NECK: Supple.  Trachea is midline.


CHEST/LUNGS: Mild coarse breath sounds.  Mild tachypnea but no accessory muscle 

use. There is no respiratory distress noted.


HEART/CARDIOVASCULAR: Regular.  There is no tachycardia.  There is no murmur.


ABDOMEN: Abdomen is soft, nontender.  Patient has normal bowel sounds.  There 

is no abdominal distention.


SKIN: Skin is warm and dry.  2+ pitting edema to the bilateral lower 

extremities.


NEURO: The patient is awake, alert, and oriented.  The patient is cooperative.  

The patient has no focal neurologic deficits.  The patient has normal speech.


MUSCULOSKELETAL: There is no tenderness or deformity.  There is no limitation 

range of motion.  There is no evidence of acute injury.





ED Course


 Vital Signs











  08/31/18 08/31/18





  23:04 23:20


 


Temperature 97.7 F 


 


Pulse Rate 104 H 


 


Respiratory 17 





Rate  


 


Blood Pressure 95/72 


 


O2 Sat by Pulse 83 L 97





Oximetry  














- Reevaluation(s)


Reevaluation #1: 





09/01/18 02:57


Just as the patient came back from the nuclear medicine study, she has started 

to exhibit left-sided weakness.  She has a left upper extremity drift and is 

having difficulty moving the left lower extremity at all.  No obvious facial 

symmetry.  No gaze preference.  She is awake, alert and oriented.  A code 

stroke was called and the patient will have a CT of the head without contrast 

stat.





09/01/18 06:38


The patient was reevaluated again after returning from CT imaging of the head, 

which did come back negative for any bleed or signs of ischemia or any other 

acute process, and the patient has begun to show improvement.  She has full 

range of motion of the left upper extremity without any drift.  She has started 

to raise her leg off of the gurney.  About 15 minutes later the patient was 

able to completely lift her leg up and has the same muscle strength as the 

right side.  At this point there was no further signs of any neurological 

deficits.  Currently she has an NIH stroke scale of 0.





- Consultations


Consultation #1: 





09/01/18 06:37


I spoke with the telemedicine neurologist on call, Dr. Rod, regarding the 

patient's sudden onset of left-sided weakness as well as the patient's 

improvement after CT scan.  He agrees that the patient does not require TPA at 

this time and does not require CT angiography of the head and neck.  He 

recommends neuro checks every 2 hours and if there is any worsening or return 

of these deficits that he should be called back and we should consider CT 

angiography at that time.





ED Medical Decision Making





- Lab Data


Result diagrams: 


 08/31/18 23:23





 08/31/18 23:23





- EKG Data


-: EKG Interpreted by Me


EKG shows normal: sinus rhythm, axis, intervals, QRS complexes (low-voltage, 

right ventricular hypertrophy), ST-T waves


Rate: tachycardia (106 bpm)





- EKG Data


When compared to previous EKG there are: no significant change


Interpretation: unchanged when compared t (7/6/18)





- Radiology Data


Radiology results: report reviewed, image reviewed





PROCEDURE: NM LUNG SCAN PERF/VENT 





TECHNIQUE: 3.94 mCi Tc-99m MAA was injected IV for pulmonary 


perfusion imaging in multiple projections. 14.82 mCi xenon 133 


gas was inhaled for pulmonary ventilation imaging in multiple 


projections. Injection site: RIGHT antecubital fossa. CPT 70023 





HISTORY: SOB, elevated dimer 





COMPARISON: No prior studies are available for comparison. 





FINDINGS: 


Perfusion: No defects . 





Ventilation: No defects . 











IMPRESSION: 


Normal Examination 











Transcribed By: [facility] 


Dictated By: SANIA KHAN MD 


Electronically Authenticated By: SANIA KHAN MD 


Signed Date/Time: 09/01/18 0243 








PROCEDURE: CT HEAD/BRAIN WO CON 





TECHNIQUE: Computerized tomography of the head was performed 


without contrast material. 





HISTORY: Stroke symptoms 





COMPARISON: No prior studies are available for comparison. 





FINDINGS: 


Skull and scalp: Normal. 





Paranasal sinuses: Normal. 





Ventricles and subarachnoid spaces: Normal. 





Cerebrum: No evidence of hemorrhage, acute infarction or mass . 





Cerebellum and brainstem: No evidence of hemorrhage, acute 


infarction or mass. 





Vasculature: Normal. 





Comments: None. 





IMPRESSION: 


Normal Examination 











Transcribed By: [facility] 


Dictated By: SANIA KHAN MD 


Electronically Authenticated By: SANIA KHAN MD 


Signed Date/Time: 09/01/18 0312 








- Medical Decision Making





Patient presents with some shortness of breath and lower extremity swelling and 

history of CHF.  She does not appear in any respiratory distress.  EKG does not 

show any signs of ST elevation MI, ischemia or dysrhythmia.  Patient does have 

significant lower show many swelling and a BNP greater than 4000.  Since 

patient had some shortness of breath and hypoxia upon presentation, a d-dimer 

was done and came back greater than 1000.  Patient has very mild renal 

insufficiency and therefore a VQ scan was done to rule out a PE.  The VQ scan 

was negative for pulmonary embolism.  However after the patient returned from 

VQ scan she suddenly appeared confused with some left-sided weakness.  At that 

time she had an NIH stroke scale of 5.  A code stroke was called and the 

patient had a CT of the head done without contrast that came back resulting as 

normal without signs of ischemia, bleed, shift, mass or any other acute 

process.  She was reevaluated multiple times both before and after CT and began 

showing improvement.  The patient was back at her baseline without any left-

sided deficits after about 45 minutes for return of the CT scan.  It is 

possible the patient had some type of a reaction to the chemicals given during 

nuclear medicine or potentially the patient had a TIA.  As per the consultation 

section, I had spoken with the telemedicine neurologist, who agreed that the 

patient did not appear to require TPA at that time as she was showing 

improvement.  She did not require CT angiography at that time as well.  We have 

continued to monitor her and there has been no regression.  However the patient 

will be admitted to the hospital for her CHF exacerbation and we will continue 

to monitor her.  The patient was accepted for admission by the hospitalist 

service and I was given permission to place bridging orders under Dr. Zapata.





- Differential Diagnosis


CHF, PE, pneumonia


Critical Care Time: No


Critical care attestation.: 


If time is entered above; I have spent that time in minutes in the direct care 

of this critically ill patient, excluding procedure time.








ED Disposition


Clinical Impression: 


 Bilateral lower extremity edema, TIA (transient ischemic attack)





CHF exacerbation


Qualifiers:


 Heart failure type: unspecified Qualified Code(s): I50.9 - Heart failure, 

unspecified





Disposition: DC-09 OP ADMIT IP TO THIS HOSP


Is pt being admited?: Yes


Condition: Fair


Referrals: 


PRIMARY CARE,MD [Primary Care Provider] - 3-5 Days

## 2018-08-31 NOTE — XRAY REPORT
FINAL REPORT



PROCEDURE:  XR CHEST 1V AP



TECHNIQUE:  Chest radiograph anteroposterior view. CPT 12243







HISTORY:  SOB 



COMPARISON:  07/06/2018



FINDINGS:  

Heart: Normal.



Mediastinum/Vessels: Normal.



Lungs/Pleural space: Normal.



Bony thorax: No acute osseous abnormality.



Life support devices: None.



IMPRESSION:  

No acute cardiopulmonary abnormality.

## 2018-09-01 LAB
ALBUMIN SERPL-MCNC: 3.6 G/DL (ref 3.9–5)
ALT SERPL-CCNC: 21 UNITS/L (ref 7–56)
APTT BLD: 29.2 SEC. (ref 24.2–36.6)
BASOPHILS # (AUTO): 0.1 K/MM3 (ref 0–0.1)
BASOPHILS NFR BLD AUTO: 1.1 % (ref 0–1.8)
BILIRUB DIRECT SERPL-MCNC: 0.6 MG/DL (ref 0–0.2)
BUN SERPL-MCNC: 36 MG/DL (ref 7–17)
BUN/CREAT SERPL: 30 %
CALCIUM SERPL-MCNC: 9.5 MG/DL (ref 8.4–10.2)
EOSINOPHIL # BLD AUTO: 0.1 K/MM3 (ref 0–0.4)
EOSINOPHIL NFR BLD AUTO: 1 % (ref 0–4.3)
HCT VFR BLD CALC: 46 % (ref 30.3–42.9)
HEMOLYSIS INDEX: 15
HGB BLD-MCNC: 14.9 GM/DL (ref 10.1–14.3)
INR PPP: 1.3 (ref 0.87–1.13)
LYMPHOCYTES # BLD AUTO: 1.5 K/MM3 (ref 1.2–5.4)
LYMPHOCYTES NFR BLD AUTO: 26.6 % (ref 13.4–35)
MCH RBC QN AUTO: 30 PG (ref 28–32)
MCHC RBC AUTO-ENTMCNC: 32 % (ref 30–34)
MCV RBC AUTO: 93 FL (ref 79–97)
MONOCYTES # (AUTO): 0.2 K/MM3 (ref 0–0.8)
MONOCYTES % (AUTO): 4 % (ref 0–7.3)
PLATELET # BLD: 309 K/MM3 (ref 140–440)
RBC # BLD AUTO: 4.93 M/MM3 (ref 3.65–5.03)

## 2018-09-01 RX ADMIN — ENOXAPARIN SODIUM SCH MG: 100 INJECTION SUBCUTANEOUS at 23:36

## 2018-09-01 RX ADMIN — FUROSEMIDE SCH MG: 10 INJECTION, SOLUTION INTRAVENOUS at 17:27

## 2018-09-01 RX ADMIN — SPIRONOLACTONE SCH: 25 TABLET ORAL at 10:45

## 2018-09-01 RX ADMIN — ASPIRIN SCH MG: 81 TABLET, COATED ORAL at 10:45

## 2018-09-01 RX ADMIN — FAMOTIDINE SCH MG: 20 TABLET ORAL at 10:15

## 2018-09-01 RX ADMIN — FAMOTIDINE SCH MG: 20 TABLET ORAL at 23:33

## 2018-09-01 RX ADMIN — LOSARTAN POTASSIUM SCH: 25 TABLET, FILM COATED ORAL at 10:45

## 2018-09-01 RX ADMIN — METOPROLOL TARTRATE SCH: 25 TABLET, FILM COATED ORAL at 17:30

## 2018-09-01 RX ADMIN — METOPROLOL TARTRATE SCH: 25 TABLET, FILM COATED ORAL at 23:32

## 2018-09-01 NOTE — CAT SCAN REPORT
FINAL REPORT



PROCEDURE:  CT HEAD/BRAIN WO CON



TECHNIQUE:  Computerized tomography of the head was performed

without contrast material. 



HISTORY:  Stroke symptoms 



COMPARISON:  No prior studies are available for comparison.



FINDINGS:  

Skull and scalp: Normal.



Paranasal sinuses: Normal.



Ventricles and subarachnoid spaces: Normal.



Cerebrum: No evidence of hemorrhage, acute infarction or mass .



Cerebellum and brainstem: No evidence of hemorrhage, acute

infarction or mass.



Vasculature: Normal.



Comments: None.



IMPRESSION:  

Normal Examination

## 2018-09-01 NOTE — HISTORY AND PHYSICAL REPORT
History of Present Illness


Date of examination: 09/01/18


Date of admission: 





9/1/18


Chief complaint: 


Snoring leg edema and shortness of breath since yesterday





History of present illness: 


A pleasant 63-year-old -American female patient with significant history 

of congestive heart failure on anti-failure medications pulmonary hypertension


Recently evaluated by cardiology, status post heart cath, nonobstructive 

coronary disease and cardiomyopathy ejection fraction 30-35%


Patient claims compliments with all her medications


Complaint of worsening effort tolerance, orthopnea, worsening leg edema


Has mild congestion and cough, not see primary care physician, has seen 

[facility] cardiologist 2 weeks ago


Denies nausea vomiting or abdominal pain




















Past History


Past Medical History: heart failure, hypertension


Past Surgical History: Other (tubal ligation)


Social history: lives with family.  denies: smoking, alcohol abuse, 

prescription drug abuse


Family history: hypertension





Medications and Allergies


 Allergies











Allergy/AdvReac Type Severity Reaction Status Date / Time


 


Penicillins Allergy  Itching Verified 07/09/18 12:09











 Home Medications











 Medication  Instructions  Recorded  Confirmed  Last Taken  Type


 


Aspirin EC [Aspirin Enteric Coated 81 mg PO QDAY 07/08/18 09/02/18 09/01/18 

History





TAB]     


 


Furosemide [Lasix TAB] 40 mg PO QDAY #30 tablet 07/11/18 09/02/18 09/01/18 Rx


 


Losartan [Cozaar] 25 mg PO QDAY #30 tablet 07/11/18 09/02/18 09/01/18 Rx


 


Metoprolol [Lopressor TAB] 12.5 mg PO BID #30 tablet 07/11/18 09/02/18 09/01/18 

Rx


 


Spironolactone [Aldactone] 12.5 mg PO QDAY #30 tablet 07/11/18 09/02/18 09/01/ 18 Rx











Active Meds: 


Active Medications





Aspirin (Halfprin Ec)  81 mg PO QDAY [facility]


Heparin Sodium (Porcine) (Heparin)  5,000 unit SUB-Q Q8HR [facility]


   Last Admin: 09/01/18 06:41 Dose:  5,000 unit


Losartan Potassium (Cozaar)  25 mg PO QDAY [facility]


Metoprolol Tartrate (Lopressor)  12.5 mg PO BID [facility]


Spironolactone (Aldactone)  12.5 mg PO QDAY [facility]











Review of Systems


Constitutional: fatigue, weakness, no weight loss, no weight gain


Ears, nose, mouth and throat: no nasal congestion, no nasal discharge


Cardiovascular: chest pain, orthopnea, edema, shortness of breath


Respiratory: cough, shortness of breath, dyspnea on exertion, congestion


Gastrointestinal: no abdominal pain, no nausea, no vomiting


Genitourinary Female: no flank pain, no dysuria


Musculoskeletal: no myalgias, no arthritis


Integumentary: no redness, no lesions


Neurological: no weakness, no numbness


Psychiatric: no anxiety, no depression


Endocrine: no cold intolerance, no heat intolerance


Hematologic/Lymphatic: no easy bruising, no easy bleeding


Allergic/Immunologic: no urticaria, no allergic rhinitis





Exam





- Constitutional


Vitals: 


 











Temp Pulse Resp BP Pulse Ox


 


 97.7 F   87   26 H  90/67   95 


 


 08/31/18 23:06  09/01/18 06:16  09/01/18 06:16  09/01/18 06:16  09/01/18 06:16











General appearance: Present: mild distress, well-nourished





- EENT


Eyes: Present: PERRL, EOM intact





- Neck


Neck: Present: supple, normal ROM





- Respiratory


Respiratory effort: normal


Respiratory: bilateral: diminished, rales, negative: rhonchi, wheezing





- Cardiovascular


Rhythm: regular


Heart Sounds: Present: S1 & S2





- Extremities


Extremities: no ischemia


Extremity abnormal: edema (bilateral edema 4+)





- Abdominal


General gastrointestinal: Present: soft, non-tender, non-distended, normal 

bowel sounds





- Integumentary


Integumentary: Present: clear, warm





- Musculoskeletal


Musculoskeletal: strength equal bilaterally





- Psychiatric


Psychiatric: appropriate mood/affect, cooperative





- Neurologic


Neurologic: CNII-XII intact, moves all extremities





Results





- Labs


CBC & Chem 7: 


 08/31/18 23:23





 09/02/18 08:00


Labs: 


 Abnormal lab results











  08/31/18 08/31/18 08/31/18 Range/Units





  23:23 23:23 23:23 


 


Hgb   14.9 H   (10.1-14.3)  gm/dl


 


Hct   46.0 H   (30.3-42.9)  %


 


RDW   18.0 H   (13.2-15.2)  %


 


PT    16.7 H  (12.2-14.9)  Sec.


 


INR    1.30 H  (0.87-1.13)  


 


D-Dimer    1149.50 H  (0-234)  ng/mlDDU


 


Sodium  136 L    (137-145)  mmol/L


 


Chloride  94.8 L    ()  mmol/L


 


BUN  36 H    (7-17)  mg/dL


 


Glucose  192 H    ()  mg/dL


 


POC Glucose     ()  


 


Total Bilirubin     (0.1-1.2)  mg/dL


 


Direct Bilirubin     (0-0.2)  mg/dL


 


NT-Pro-B Natriuret Pep  4255 H    (0-900)  pg/mL


 


Albumin     (3.9-5)  g/dL














  08/31/18 09/01/18 Range/Units





  23:23 02:59 


 


Hgb    (10.1-14.3)  gm/dl


 


Hct    (30.3-42.9)  %


 


RDW    (13.2-15.2)  %


 


PT    (12.2-14.9)  Sec.


 


INR    (0.87-1.13)  


 


D-Dimer    (0-234)  ng/mlDDU


 


Sodium    (137-145)  mmol/L


 


Chloride    ()  mmol/L


 


BUN    (7-17)  mg/dL


 


Glucose    ()  mg/dL


 


POC Glucose   186 H  ()  


 


Total Bilirubin  1.80 H   (0.1-1.2)  mg/dL


 


Direct Bilirubin  0.6 H   (0-0.2)  mg/dL


 


NT-Pro-B Natriuret Pep    (0-900)  pg/mL


 


Albumin  3.6 L   (3.9-5)  g/dL














Assessment and Plan


--Acute on chronic systolic congestive heart failure;


Ejection fraction 45-50%, oxygen titrated to O2 sats more than 90%


IV diuretics, beta blockers, ace inhibitors, input output monitoring


Low-sodium diet, fluid restriction, cardiology consultation if needed





--Acute on chronic respiratory failure


Secondary to acute exacerbation of congestive heart failure


Continue supportive care, anti-failure medications





--Hypertension; moderate control


Continue current antihypertensives and when necessary medications





--Mild malnutrition; supportive care and nutrition supplements





--DVT prophylaxis; Lovenox





--Full CODE STATUS





Closely monitor the patient and adjust management as needed


Possible discharge in 1-2 days if stable


Patient's condition treatment plan discussed in detail with the patient and her 

nurse





Her telemetry beds available, we'll downgrade to medical floor with remote 

telemetry

## 2018-09-01 NOTE — NUCLEAR MEDICINE REPORT
FINAL REPORT



PROCEDURE:  NM LUNG SCAN PERF/VENT



TECHNIQUE:  3.94 mCi Tc-99m MAA was injected IV for pulmonary

perfusion imaging in multiple projections. 14.82 mCi xenon 133

gas was inhaled for pulmonary ventilation imaging in multiple

projections. Injection site: RIGHT antecubital fossa. CPT 86021



HISTORY:  SOB, elevated dimer 



COMPARISON:  No prior studies are available for comparison.



FINDINGS:  

Perfusion: No defects .



Ventilation: No defects .







IMPRESSION:  

Normal Examination

## 2018-09-02 LAB
BUN SERPL-MCNC: 34 MG/DL (ref 7–17)
BUN/CREAT SERPL: 31 %
CALCIUM SERPL-MCNC: 8.9 MG/DL (ref 8.4–10.2)
HEMOLYSIS INDEX: 51

## 2018-09-02 RX ADMIN — FUROSEMIDE SCH MG: 10 INJECTION, SOLUTION INTRAVENOUS at 19:12

## 2018-09-02 RX ADMIN — ASPIRIN SCH MG: 81 TABLET, COATED ORAL at 10:57

## 2018-09-02 RX ADMIN — FAMOTIDINE SCH MG: 20 TABLET ORAL at 21:35

## 2018-09-02 RX ADMIN — MIDODRINE HYDROCHLORIDE SCH: 2.5 TABLET ORAL at 18:46

## 2018-09-02 RX ADMIN — ENOXAPARIN SODIUM SCH MG: 100 INJECTION SUBCUTANEOUS at 21:35

## 2018-09-02 RX ADMIN — FAMOTIDINE SCH MG: 20 TABLET ORAL at 10:57

## 2018-09-02 NOTE — PROGRESS NOTE
Assessment and Plan


Assessment and plan: 


--Acute on chronic systolic congestive heart failure;


Ejection fraction 45-50%, oxygen titrated to O2 sats more than 90%


Patient is hypotensive reduce IV diuretics, beta blockers, ace inhibitors doses

, 


input output monitoring, Low-sodium diet, fluid restriction, cardiology 

consultation if needed





--Acute on chronic respiratory failure


Secondary to acute exacerbation of congestive heart failure


Continue supportive care, anti-failure medications, oxygen titrated O2 sats 

more than 90%





--History of Hypertension; she is currently hypotensive, asymptomatic


Hold antihypertensives and when necessary medications


Add Midodrine ,Closely monitor blood pressures





--Mild malnutrition; supportive care and nutrition supplements





--DVT prophylaxis; Lovenox





--Full CODE STATUS





Closely monitor the patient and adjust management as needed


Possible discharge in 1-2 days if stable


Patient's condition treatment plan discussed in detail with the patient and her 

nurse





Her telemetry beds available, we'll downgrade to medical floor with remote 

telemetry











History


Interval history: 


Patient seen and examined medical records reviewed


Patient feels slightly better


Ambulatory in the room, lower extremity swelling slightly improved


Alert awake oriented 3 not in acute distress


Blood pressures are on the lower range








Hospitalist Physical





- Constitutional


Vitals: 


 











Temp Pulse Resp BP Pulse Ox


 


 98.7 F   54 L  20   119/96   89 


 


 09/01/18 19:40  09/01/18 23:32  09/01/18 19:40  09/01/18 23:32  09/01/18 19:40











General appearance: Present: no acute distress, well-nourished





- EENT


Eyes: Present: PERRL, EOM intact





- Neck


Neck: Present: supple, normal ROM





- Respiratory


Respiratory effort: normal


Respiratory: bilateral: diminished, rales, negative: rhonchi, wheezing





- Cardiovascular


Rhythm: regular


Heart Sounds: Present: S1 & S2





- Extremities


Extremities: no ischemia


Extremity abnormal: edema (bilateral 2+ pedal edema)





- Abdominal


General gastrointestinal: soft, non-tender, non-distended, normal bowel sounds





- Integumentary


Integumentary: Present: clear, warm





- Psychiatric


Psychiatric: appropriate mood/affect, cooperative





- Neurologic


Neurologic: CNII-XII intact, moves all extremities





Results





- Labs


CBC & Chem 7: 


 08/31/18 23:23





 09/02/18 08:00


Labs: 


 Laboratory Last Values











WBC  5.8 K/mm3 (4.5-11.0)   08/31/18  23:23    


 


RBC  4.93 M/mm3 (3.65-5.03)   08/31/18  23:23    


 


Hgb  14.9 gm/dl (10.1-14.3)  H  08/31/18  23:23    


 


Hct  46.0 % (30.3-42.9)  H  08/31/18  23:23    


 


MCV  93 fl (79-97)   08/31/18  23:23    


 


MCH  30 pg (28-32)   08/31/18  23:23    


 


MCHC  32 % (30-34)   08/31/18  23:23    


 


RDW  18.0 % (13.2-15.2)  H  08/31/18  23:23    


 


Plt Count  309 K/mm3 (140-440)   08/31/18  23:23    


 


Lymph % (Auto)  26.6 % (13.4-35.0)   08/31/18  23:23    


 


Mono % (Auto)  4.0 % (0.0-7.3)   08/31/18  23:23    


 


Eos % (Auto)  1.0 % (0.0-4.3)   08/31/18  23:23    


 


Baso % (Auto)  1.1 % (0.0-1.8)   08/31/18  23:23    


 


Lymph #  1.5 K/mm3 (1.2-5.4)   08/31/18  23:23    


 


Mono #  0.2 K/mm3 (0.0-0.8)   08/31/18  23:23    


 


Eos #  0.1 K/mm3 (0.0-0.4)   08/31/18  23:23    


 


Baso #  0.1 K/mm3 (0.0-0.1)   08/31/18  23:23    


 


Seg Neutrophils %  67.3 % (40.0-70.0)   08/31/18  23:23    


 


Seg Neutrophils #  3.9 K/mm3 (1.8-7.7)   08/31/18  23:23    


 


PT  16.7 Sec. (12.2-14.9)  H  08/31/18  23:23    


 


INR  1.30  (0.87-1.13)  H  08/31/18  23:23    


 


APTT  29.2 Sec. (24.2-36.6)   08/31/18  23:23    


 


D-Dimer  1149.50 ng/mlDDU (0-234)  H  08/31/18  23:23    


 


Sodium  136 mmol/L (137-145)  L  09/02/18  08:00    


 


Potassium  3.9 mmol/L (3.6-5.0)   09/02/18  08:00    


 


Chloride  99.3 mmol/L ()   09/02/18  08:00    


 


Carbon Dioxide  22 mmol/L (22-30)   09/02/18  08:00    


 


Anion Gap  19 mmol/L  09/02/18  08:00    


 


BUN  34 mg/dL (7-17)  H  09/02/18  08:00    


 


Creatinine  1.1 mg/dL (0.7-1.2)   09/02/18  08:00    


 


Estimated GFR  > 60 ml/min  09/02/18  08:00    


 


BUN/Creatinine Ratio  31 %  09/02/18  08:00    


 


Glucose  105 mg/dL ()  H  09/02/18  08:00    


 


POC Glucose  186  ()  H  09/01/18  02:59    


 


Calcium  8.9 mg/dL (8.4-10.2)   09/02/18  08:00    


 


Phosphorus  3.80 mg/dL (2.5-4.5)   09/02/18  08:00    


 


Magnesium  2.20 mg/dL (1.7-2.3)   09/02/18  08:00    


 


Total Bilirubin  1.80 mg/dL (0.1-1.2)  H  08/31/18  23:23    


 


Direct Bilirubin  0.6 mg/dL (0-0.2)  H  08/31/18  23:23    


 


Indirect Bilirubin  1.2 mg/dL  08/31/18  23:23    


 


AST  20 units/L (5-40)   08/31/18  23:23    


 


ALT  21 units/L (7-56)   08/31/18  23:23    


 


Alkaline Phosphatase  111 units/L ()   08/31/18  23:23    


 


Troponin T  0.014 ng/mL (0.00-0.029)   08/31/18  23:23    


 


NT-Pro-B Natriuret Pep  4255 pg/mL (0-900)  H  08/31/18  23:23    


 


Total Protein  7.3 g/dL (6.3-8.2)   08/31/18  23:23    


 


Albumin  3.6 g/dL (3.9-5)  L  08/31/18  23:23    


 


Albumin/Globulin Ratio  1.0 %  08/31/18  23:23

## 2018-09-03 LAB
BASOPHILS # (AUTO): 0.1 K/MM3 (ref 0–0.1)
BASOPHILS NFR BLD AUTO: 1.4 % (ref 0–1.8)
BUN SERPL-MCNC: 33 MG/DL (ref 7–17)
BUN/CREAT SERPL: 28 %
CALCIUM SERPL-MCNC: 9 MG/DL (ref 8.4–10.2)
EOSINOPHIL # BLD AUTO: 0.1 K/MM3 (ref 0–0.4)
EOSINOPHIL NFR BLD AUTO: 3 % (ref 0–4.3)
HCT VFR BLD CALC: 43.5 % (ref 30.3–42.9)
HEMOLYSIS INDEX: 23
HGB BLD-MCNC: 14.6 GM/DL (ref 10.1–14.3)
LYMPHOCYTES # BLD AUTO: 1.5 K/MM3 (ref 1.2–5.4)
LYMPHOCYTES NFR BLD AUTO: 39.9 % (ref 13.4–35)
MCH RBC QN AUTO: 30 PG (ref 28–32)
MCHC RBC AUTO-ENTMCNC: 34 % (ref 30–34)
MCV RBC AUTO: 90 FL (ref 79–97)
MONOCYTES # (AUTO): 0.3 K/MM3 (ref 0–0.8)
MONOCYTES % (AUTO): 9 % (ref 0–7.3)
PLATELET # BLD: 162 K/MM3 (ref 140–440)
RBC # BLD AUTO: 4.86 M/MM3 (ref 3.65–5.03)

## 2018-09-03 RX ADMIN — FUROSEMIDE SCH: 10 INJECTION, SOLUTION INTRAVENOUS at 17:36

## 2018-09-03 RX ADMIN — MIDODRINE HYDROCHLORIDE SCH MG: 5 TABLET ORAL at 22:27

## 2018-09-03 RX ADMIN — FUROSEMIDE SCH: 10 INJECTION, SOLUTION INTRAVENOUS at 06:03

## 2018-09-03 RX ADMIN — ENOXAPARIN SODIUM SCH MG: 100 INJECTION SUBCUTANEOUS at 22:27

## 2018-09-03 RX ADMIN — FAMOTIDINE SCH MG: 20 TABLET ORAL at 09:31

## 2018-09-03 RX ADMIN — ASPIRIN SCH MG: 81 TABLET, COATED ORAL at 09:31

## 2018-09-03 RX ADMIN — MIDODRINE HYDROCHLORIDE SCH MG: 2.5 TABLET ORAL at 08:30

## 2018-09-03 RX ADMIN — ACETAMINOPHEN PRN MG: 325 TABLET ORAL at 16:50

## 2018-09-03 RX ADMIN — MIDODRINE HYDROCHLORIDE SCH MG: 2.5 TABLET ORAL at 16:40

## 2018-09-03 RX ADMIN — FAMOTIDINE SCH MG: 20 TABLET ORAL at 22:26

## 2018-09-03 RX ADMIN — MIDODRINE HYDROCHLORIDE SCH MG: 2.5 TABLET ORAL at 12:55

## 2018-09-03 NOTE — CONSULTATION
CARDIOLOGY EVALUATION



REASON FOR EVLUATION:  Congestive heart failure.



HISTORY OF PRESENT ILLNESS:  The patient is a 63-year-old female who comes to

the Emergency Room with difficulty in breathing and fatigue.  The patient is

well known to us with congestive heart failure.  The patient had cardiac

catheterization in July and apparently was diagnosed to have nonischemic

cardiomyopathy.  The patient had a cardiac catheterization done in July 2018,

which revealed cardiomyopathy with left ventricular ejection fraction of 35%

with normal coronary anatomy.  The patient is noted to have a PFO with a left to

right shunt.  No evidence of ASD by O2 saturation.  Moderate pulmonary

hypertension was noted.  Yesterday, the patient was noted to be hypotensive and

blood pressure medications were on hold.  The patient is known to have had

hypertension in the past.  The patient also has a history of chronic cough. 

Today, the patient is feeling somewhat better.  With the reduction of

medications, the blood pressure has improved.



REVIEW OF SYSTEMS:

HEAD, EYES, EARS, NOSE AND THROAT:  No symptoms.

ENDOCRINE:  No history of diabetes or thyroid problems.

GASTROINTESTINAL:  No abdominal pain, nausea, or vomiting.  Bowel habits have

been regular.

GENITOURINARY:  No symptoms.

CENTRAL NERVOUS SYSTEM:  No history of cerebrovascular accident or convulsive

disorder.



PHYSICAL EXAMINATION:

GENERAL:  Adult female, in no acute distress.

VITAL SIGNS:  Blood pressure is 91/71.

HEAD, EYES, EARS, NOSE, AND THROAT:  Unremarkable.

NECK:  Supple.  No thyromegaly.  Both carotids are palpable and equal.  Neck

veins are flat.

CHEST:  Symmetrical.

LUNGS:  Essentially clear.  Few scattered basilar rales are present.

HEART:  S1 and S2 are heard well.  Grade 1-2/6 soft systolic murmur is present. 

Rhythm is noted to be regular.

ABDOMEN:  Soft and nontender.

EXTREMITIES:  1+ bilateral pitting edema is present.



EKG:  Sinus rhythm, right ventricular hypertrophy.



LABORATORY DATA:  Hemoglobin 14.6, hematocrit 43.9, potassium 4.5, BUN 33,

creatinine 1.2.  BNP 4255.



IMPRESSION:

1.  Congestive heart failure.

2.  Nonischemic cardiomyopathy.  Cardiac catheterization in July 2018, ejection

fraction of 35% with normal coronary anatomy.

3.  Patent foramen ovale.

4.  Pulmonary hypertension.

5.  Hypotension.



The patient is seen for cardiac evaluation.  The patient was hypotensive when

she came in and with the withholding of medications, the blood pressure has

improved.  The patient is also on a small dose of midodrine.  We will continue

monitoring closely.



Thank you for allowing me to participate in the care of this pleasant lady.





DD: 09/03/2018 13:53

DT: 09/03/2018 16:06

JOB# [id number]  [id number]

KARY/NTS

## 2018-09-03 NOTE — PROGRESS NOTE
Assessment and Plan


Assessment and plan: 


--Hypotension; probably secondary to congestive heart failure


Hold antihypertensives, Lasix as tolerated


On Midodrin, increase dose to 10 mg twice a day, cardiology following





--Acute on chronic systolic congestive heart failure;


Ejection fraction 45-50%, oxygen titrated to O2 sats more than 90%


Patient is hypotensive ,hold IV diuretics, beta blockers, ace inhibitors doses, 


input output monitoring, Low-sodium diet.





--Acute on chronic respiratory failure; mild improvement


Secondary to acute exacerbation of congestive heart failure


oxygen titrated O2 sats more than 90%





--History of Hypertension; she is currently hypotensive, hold all BP meds





--Mild malnutrition; supportive care and nutrition supplements





--DVT prophylaxis; Lovenox





--Full CODE STATUS





Physical therapy occupational therapy 


DC planning; possible home with home health and medically stable





Disposition; follow patient's blood pressures, cardiology recommendations


Possible discharge in 1-2 days if stable








History


Interval history: 


Patient Seen and examined medical records reviewed


Patient feels better, mild chest congestion


Lower extremity edema significantly improved


Patient remains hypotensive, asymptomatic


Blood pressure and anti-failure medicines on hold


Vital signs reviewed








Hospitalist Physical





- Constitutional


Vitals: 


 











Temp Pulse Resp BP Pulse Ox


 


 97.5 F L  78   18   91/71   98 


 


 09/03/18 09:56  09/03/18 09:56  09/03/18 09:56  09/03/18 09:56  09/03/18 09:56











General appearance: Present: no acute distress, well-nourished





- EENT


Eyes: Present: PERRL, EOM intact





- Neck


Neck: Present: supple, normal ROM





- Respiratory


Respiratory effort: normal


Respiratory: bilateral: diminished, rales, negative: rhonchi, wheezing





- Cardiovascular


Rhythm: regular


Heart Sounds: Present: S1 & S2





- Extremities


Extremities: no ischemia


Extremity abnormal: edema





- Abdominal


General gastrointestinal: soft, non-tender, non-distended, normal bowel sounds





- Integumentary


Integumentary: Present: clear, warm





- Psychiatric


Psychiatric: appropriate mood/affect, cooperative





- Neurologic


Neurologic: CNII-XII intact, moves all extremities





Results





- Labs


CBC & Chem 7: 


 09/03/18 07:36





 09/03/18 07:36


Labs: 


 Laboratory Last Values











WBC  3.7 K/mm3 (4.5-11.0)  L  09/03/18  07:36    


 


RBC  4.86 M/mm3 (3.65-5.03)   09/03/18  07:36    


 


Hgb  14.6 gm/dl (10.1-14.3)  H  09/03/18  07:36    


 


Hct  43.5 % (30.3-42.9)  H  09/03/18  07:36    


 


MCV  90 fl (79-97)   09/03/18  07:36    


 


MCH  30 pg (28-32)   09/03/18  07:36    


 


MCHC  34 % (30-34)   09/03/18  07:36    


 


RDW  17.3 % (13.2-15.2)  H  09/03/18  07:36    


 


Plt Count  162 K/mm3 (140-440)   09/03/18  07:36    


 


Lymph % (Auto)  39.9 % (13.4-35.0)  H  09/03/18  07:36    


 


Mono % (Auto)  9.0 % (0.0-7.3)  H  09/03/18  07:36    


 


Eos % (Auto)  3.0 % (0.0-4.3)   09/03/18  07:36    


 


Baso % (Auto)  1.4 % (0.0-1.8)   09/03/18  07:36    


 


Lymph #  1.5 K/mm3 (1.2-5.4)   09/03/18  07:36    


 


Mono #  0.3 K/mm3 (0.0-0.8)   09/03/18  07:36    


 


Eos #  0.1 K/mm3 (0.0-0.4)   09/03/18  07:36    


 


Baso #  0.1 K/mm3 (0.0-0.1)   09/03/18  07:36    


 


Seg Neutrophils %  46.7 % (40.0-70.0)   09/03/18  07:36    


 


Seg Neutrophils #  1.7 K/mm3 (1.8-7.7)  L  09/03/18  07:36    


 


PT  16.7 Sec. (12.2-14.9)  H  08/31/18  23:23    


 


INR  1.30  (0.87-1.13)  H  08/31/18  23:23    


 


APTT  29.2 Sec. (24.2-36.6)   08/31/18  23:23    


 


D-Dimer  1149.50 ng/mlDDU (0-234)  H  08/31/18  23:23    


 


Sodium  135 mmol/L (137-145)  L  09/03/18  07:36    


 


Potassium  4.5 mmol/L (3.6-5.0)   09/03/18  07:36    


 


Chloride  99.5 mmol/L ()   09/03/18  07:36    


 


Carbon Dioxide  21 mmol/L (22-30)  L  09/03/18  07:36    


 


Anion Gap  19 mmol/L  09/03/18  07:36    


 


BUN  33 mg/dL (7-17)  H  09/03/18  07:36    


 


Creatinine  1.2 mg/dL (0.7-1.2)   09/03/18  07:36    


 


Estimated GFR  55 ml/min  09/03/18  07:36    


 


BUN/Creatinine Ratio  28 %  09/03/18  07:36    


 


Glucose  93 mg/dL ()   09/03/18  07:36    


 


POC Glucose  186  ()  H  09/01/18  02:59    


 


Calcium  9.0 mg/dL (8.4-10.2)   09/03/18  07:36    


 


Phosphorus  3.80 mg/dL (2.5-4.5)   09/02/18  08:00    


 


Magnesium  2.00 mg/dL (1.7-2.3)   09/03/18  07:36    


 


Total Bilirubin  1.80 mg/dL (0.1-1.2)  H  08/31/18  23:23    


 


Direct Bilirubin  0.6 mg/dL (0-0.2)  H  08/31/18  23:23    


 


Indirect Bilirubin  1.2 mg/dL  08/31/18  23:23    


 


AST  20 units/L (5-40)   08/31/18  23:23    


 


ALT  21 units/L (7-56)   08/31/18  23:23    


 


Alkaline Phosphatase  111 units/L ()   08/31/18  23:23    


 


Troponin T  0.014 ng/mL (0.00-0.029)   08/31/18  23:23    


 


NT-Pro-B Natriuret Pep  4255 pg/mL (0-900)  H  08/31/18  23:23    


 


Total Protein  7.3 g/dL (6.3-8.2)   08/31/18  23:23    


 


Albumin  3.6 g/dL (3.9-5)  L  08/31/18  23:23    


 


Albumin/Globulin Ratio  1.0 %  08/31/18  23:23

## 2018-09-03 NOTE — EVENT NOTE
Date: 09/03/18





Cardiology no dictated


#1 dyspnea and hypotension probably secondary to congestive heart failure and 

respiratory failure.


Patient is doing better today.  Medications are on hold and she is on Mididrin.

  


#2 nonischemic cardiomyopathy


Cardiac July 2018 normal coronary anatomy with EF 35%.  Patent foramina ovale 

with pulmonary hypertension


#3 respiratory failure


Will follow closely


Thank you Dr. Kocherla Dr. BK Mohan

## 2018-09-04 LAB
ALBUMIN SERPL-MCNC: 3 G/DL (ref 3.9–5)
ALT SERPL-CCNC: 27 UNITS/L (ref 7–56)
BASOPHILS # (AUTO): 0 K/MM3 (ref 0–0.1)
BASOPHILS NFR BLD AUTO: 1.1 % (ref 0–1.8)
BUN SERPL-MCNC: 32 MG/DL (ref 7–17)
BUN/CREAT SERPL: 29 %
CALCIUM SERPL-MCNC: 9 MG/DL (ref 8.4–10.2)
EOSINOPHIL # BLD AUTO: 0.1 K/MM3 (ref 0–0.4)
EOSINOPHIL NFR BLD AUTO: 2.9 % (ref 0–4.3)
HCT VFR BLD CALC: 42.6 % (ref 30.3–42.9)
HDLC SERPL-MCNC: 49 MG/DL (ref 40–59)
HEMOLYSIS INDEX: 28
HGB BLD-MCNC: 14.2 GM/DL (ref 10.1–14.3)
LYMPHOCYTES # BLD AUTO: 1.3 K/MM3 (ref 1.2–5.4)
LYMPHOCYTES NFR BLD AUTO: 42.9 % (ref 13.4–35)
MCH RBC QN AUTO: 30 PG (ref 28–32)
MCHC RBC AUTO-ENTMCNC: 33 % (ref 30–34)
MCV RBC AUTO: 91 FL (ref 79–97)
MONOCYTES # (AUTO): 0.3 K/MM3 (ref 0–0.8)
MONOCYTES % (AUTO): 8.3 % (ref 0–7.3)
PLATELET # BLD: 217 K/MM3 (ref 140–440)
RBC # BLD AUTO: 4.71 M/MM3 (ref 3.65–5.03)

## 2018-09-04 RX ADMIN — METOPROLOL TARTRATE SCH: 25 TABLET, FILM COATED ORAL at 21:50

## 2018-09-04 RX ADMIN — ENOXAPARIN SODIUM SCH MG: 100 INJECTION SUBCUTANEOUS at 21:00

## 2018-09-04 RX ADMIN — FAMOTIDINE SCH MG: 20 TABLET ORAL at 09:21

## 2018-09-04 RX ADMIN — FAMOTIDINE SCH MG: 20 TABLET ORAL at 21:00

## 2018-09-04 RX ADMIN — FUROSEMIDE SCH: 10 INJECTION, SOLUTION INTRAVENOUS at 20:10

## 2018-09-04 RX ADMIN — FUROSEMIDE SCH: 10 INJECTION, SOLUTION INTRAVENOUS at 21:50

## 2018-09-04 RX ADMIN — GUAIFENESIN PRN MG: 100 SOLUTION ORAL at 20:59

## 2018-09-04 RX ADMIN — MIDODRINE HYDROCHLORIDE SCH MG: 5 TABLET ORAL at 09:21

## 2018-09-04 RX ADMIN — ASPIRIN SCH MG: 81 TABLET, COATED ORAL at 09:21

## 2018-09-04 RX ADMIN — FUROSEMIDE SCH MG: 10 INJECTION, SOLUTION INTRAVENOUS at 05:20

## 2018-09-04 RX ADMIN — SPIRONOLACTONE SCH: 25 TABLET ORAL at 21:50

## 2018-09-04 RX ADMIN — MIDODRINE HYDROCHLORIDE SCH MG: 5 TABLET ORAL at 20:59

## 2018-09-04 RX ADMIN — LOSARTAN POTASSIUM SCH: 25 TABLET, FILM COATED ORAL at 21:50

## 2018-09-04 NOTE — PROGRESS NOTE
Assessment and Plan


Cardiac medications on hold in setting of borderline hypotension.


Continue diuresis as tolerated. Initiate dobutamine gtt. Tx to telemetry. D/w 

Dr. Kocherla.  





The patient has been seen in conjunction with Dr. MARBELLA Rashid who agrees with the 

assessment and plan of care. 





- Patient Problems


(1) Biventricular congestive heart failure


Current Visit: Yes   Status: Acute   





(2) Nonischemic cardiomyopathy


Current Visit: Yes   Status: Chronic   





(3) Cor pulmonale


Current Visit: Yes   Status: Chronic   





(4) Pulmonary HTN


Current Visit: Yes   Status: Chronic   





(5) Tricuspid regurgitation


Current Visit: Yes   Status: Chronic   





(6) Abnormal EKG


Current Visit: Yes   Status: Acute   





(7) PFO (patent foramen ovale)


Current Visit: Yes   Status: Chronic   





(8) Hypotension


Current Visit: Yes   Status: Chronic   





(9) Hypoxia


Current Visit: Yes   Status: Chronic   





Subjective


Date of service: 09/04/18


Principal diagnosis: HF


Interval history: 


pt resting comfortably in bed, states she feels about the same as yesterday. 

BPs remain borderline hypotensive. BLE edema persists. 








Objective





  Last Vital Signs











Temp  97.4 F L  09/04/18 09:00


 


Pulse  78   09/04/18 09:00


 


Resp  16   09/04/18 09:00


 


BP  90/66   09/04/18 09:00


 


Pulse Ox  98   09/04/18 09:00




















- Physical Examination


General: No Apparent Distress


HEENT: Positive: PERRL, Normocephaly, Mucus Membranes Moist


Neck: Positive: neck supple, trachea midline


Cardiac: Positive: Reg Rate and Rhythm, S1/S2


Lungs: Positive: Decreased Breath Sounds, Rales (bibasilar)


Neuro: Positive: Grossly Intact


Abdomen: Positive: Soft.  Negative: Tender


Extremities: Present: +2 Edema (BLE)





- Labs and Meds


 Cardiac Enzymes











  09/04/18 Range/Units





  08:02 


 


AST  22  (5-40)  units/L








 CBC











  09/04/18 Range/Units





  08:02 


 


WBC  3.1 L  (4.5-11.0)  K/mm3


 


RBC  4.71  (3.65-5.03)  M/mm3


 


Hgb  14.2  (10.1-14.3)  gm/dl


 


Hct  42.6  (30.3-42.9)  %


 


Plt Count  217  (140-440)  K/mm3


 


Lymph #  1.3  (1.2-5.4)  K/mm3


 


Mono #  0.3  (0.0-0.8)  K/mm3


 


Eos #  0.1  (0.0-0.4)  K/mm3


 


Baso #  0.0  (0.0-0.1)  K/mm3








 Comprehensive Metabolic Panel











  09/04/18 Range/Units





  08:02 


 


Sodium  137  (137-145)  mmol/L


 


Potassium  4.6  (3.6-5.0)  mmol/L


 


Chloride  102.0  ()  mmol/L


 


Carbon Dioxide  21 L  (22-30)  mmol/L


 


BUN  32 H  (7-17)  mg/dL


 


Creatinine  1.1  (0.7-1.2)  mg/dL


 


Glucose  98  ()  mg/dL


 


Calcium  9.0  (8.4-10.2)  mg/dL


 


AST  22  (5-40)  units/L


 


ALT  27  (7-56)  units/L


 


Alkaline Phosphatase  103  ()  units/L


 


Total Protein  6.7  (6.3-8.2)  g/dL


 


Albumin  3.0 L  (3.9-5)  g/dL














- Imaging and Cardiology


EKG: report reviewed, image reviewed


Echo: report reviewed (7/9/2018: VICENTE; EF 45-50%, marked RA enlargement, mod RV 

enlargement, mod RV systolic dysfunction, trace MR, mod to severe TR, mild pulm 

HTN with RVSP 38mmHg, mod left to right shunt suggestive of ostium secundum 

atrial septal defect)


Cardiac cath: report reviewed (7/10/2018: LHC & RHC, NO ASD, no O2 sat step-up, 

EF 35%, normal coronaries, mod pulmonary HTN. )

## 2018-09-04 NOTE — PROGRESS NOTE
Assessment and Plan


Assessment and plan: 


--Hypotension; blood pressures are still in the lower range, secondary to 

congestive heart failure


Hold antihypertensives, Lasix as tolerated, On Midodrin 10 mg twice a day, 


Patient was on dobutamine drip, however had cardiac arrhythmia, dobutamine 

discontinued





--New onset A. fib with rapid ventricular rate; paroxysmal


Cardiology considering amiodarone, full dose anticoagulation





--Acute on chronic systolic congestive heart failure;


Ejection fraction 45-50%, oxygen titrated to O2 sats more than 90%


Patient is hypotensive ,hold IV diuretics, beta blockers, ace inhibitors doses, 


input output monitoring, Low-sodium diet.





--Acute on chronic respiratory failure; mild improvement


Secondary to acute exacerbation of congestive heart failure


oxygen titrated O2 sats more than 90%





--History of Hypertension; she is currently hypotensive, hold all BP meds





--Mild malnutrition; supportive care and nutrition supplements





--DVT prophylaxis; Lovenox





--Full CODE STATUS





Physical therapy occupational therapy 


DC planning; possible home with home health and medically stable





Transfer the patient to telemetry for possible dobutamine drip per cardiology





History


Interval history: 


Patient seen and examined medical records reviewed


Overnight events noted patient was hypotensive and tachycardic


New-onset paroxysmal atrial fibrillation, dobutamine discontinued


Plans to initiate amiodarone and anticoagulation





Patient feels slightly better remains hypotensive


Patient is alert awake oriented 3


Vital signs reviewed








Hospitalist Physical





- Constitutional


Vitals: 


 











Temp Pulse Resp BP Pulse Ox


 


 97.4 F L  78   16   90/66   98 


 


 09/04/18 09:00  09/04/18 09:00  09/04/18 09:00  09/04/18 09:00  09/04/18 09:00











General appearance: Present: no acute distress, well-nourished





- EENT


Eyes: Present: PERRL, EOM intact





- Neck


Neck: Present: supple, normal ROM





- Respiratory


Respiratory effort: normal


Respiratory: bilateral: diminished, rales, negative: rhonchi, wheezing





- Cardiovascular


Rhythm: regular


Heart Sounds: Present: S1 & S2





- Extremities


Extremities: no ischemia


Extremity abnormal: edema





- Abdominal


General gastrointestinal: soft, non-tender, non-distended, normal bowel sounds





- Integumentary


Integumentary: Present: clear, warm





- Psychiatric


Psychiatric: appropriate mood/affect, cooperative





- Neurologic


Neurologic: CNII-XII intact, moves all extremities





Results





- Labs


CBC & Chem 7: 


 09/05/18 05:57





 09/05/18 05:57


Labs: 


 Laboratory Last Values











WBC  3.1 K/mm3 (4.5-11.0)  L  09/04/18  08:02    


 


RBC  4.71 M/mm3 (3.65-5.03)   09/04/18  08:02    


 


Hgb  14.2 gm/dl (10.1-14.3)   09/04/18  08:02    


 


Hct  42.6 % (30.3-42.9)   09/04/18  08:02    


 


MCV  91 fl (79-97)   09/04/18  08:02    


 


MCH  30 pg (28-32)   09/04/18  08:02    


 


MCHC  33 % (30-34)   09/04/18  08:02    


 


RDW  17.7 % (13.2-15.2)  H  09/04/18  08:02    


 


Plt Count  217 K/mm3 (140-440)   09/04/18  08:02    


 


Lymph % (Auto)  42.9 % (13.4-35.0)  H  09/04/18  08:02    


 


Mono % (Auto)  8.3 % (0.0-7.3)  H  09/04/18  08:02    


 


Eos % (Auto)  2.9 % (0.0-4.3)   09/04/18  08:02    


 


Baso % (Auto)  1.1 % (0.0-1.8)   09/04/18  08:02    


 


Lymph #  1.3 K/mm3 (1.2-5.4)   09/04/18  08:02    


 


Mono #  0.3 K/mm3 (0.0-0.8)   09/04/18  08:02    


 


Eos #  0.1 K/mm3 (0.0-0.4)   09/04/18  08:02    


 


Baso #  0.0 K/mm3 (0.0-0.1)   09/04/18  08:02    


 


Seg Neutrophils %  44.8 % (40.0-70.0)   09/04/18  08:02    


 


Seg Neutrophils #  1.4 K/mm3 (1.8-7.7)  L  09/04/18  08:02    


 


PT  16.7 Sec. (12.2-14.9)  H  08/31/18  23:23    


 


INR  1.30  (0.87-1.13)  H  08/31/18  23:23    


 


APTT  29.2 Sec. (24.2-36.6)   08/31/18  23:23    


 


D-Dimer  1149.50 ng/mlDDU (0-234)  H  08/31/18  23:23    


 


Sodium  137 mmol/L (137-145)   09/04/18  08:02    


 


Potassium  4.6 mmol/L (3.6-5.0)   09/04/18  08:02    


 


Chloride  102.0 mmol/L ()   09/04/18  08:02    


 


Carbon Dioxide  21 mmol/L (22-30)  L  09/04/18  08:02    


 


Anion Gap  19 mmol/L  09/04/18  08:02    


 


BUN  32 mg/dL (7-17)  H  09/04/18  08:02    


 


Creatinine  1.1 mg/dL (0.7-1.2)   09/04/18  08:02    


 


Estimated GFR  > 60 ml/min  09/04/18  08:02    


 


BUN/Creatinine Ratio  29 %  09/04/18  08:02    


 


Glucose  98 mg/dL ()   09/04/18  08:02    


 


POC Glucose  186  ()  H  09/01/18  02:59    


 


Calcium  9.0 mg/dL (8.4-10.2)   09/04/18  08:02    


 


Phosphorus  3.80 mg/dL (2.5-4.5)   09/02/18  08:00    


 


Magnesium  2.00 mg/dL (1.7-2.3)   09/03/18  07:36    


 


Total Bilirubin  1.30 mg/dL (0.1-1.2)  H  09/04/18  08:02    


 


Direct Bilirubin  0.6 mg/dL (0-0.2)  H  08/31/18  23:23    


 


Indirect Bilirubin  1.2 mg/dL  08/31/18  23:23    


 


AST  22 units/L (5-40)   09/04/18  08:02    


 


ALT  27 units/L (7-56)   09/04/18  08:02    


 


Alkaline Phosphatase  103 units/L ()   09/04/18  08:02    


 


Troponin T  0.014 ng/mL (0.00-0.029)   08/31/18  23:23    


 


NT-Pro-B Natriuret Pep  4255 pg/mL (0-900)  H  08/31/18  23:23    


 


Total Protein  6.7 g/dL (6.3-8.2)   09/04/18  08:02    


 


Albumin  3.0 g/dL (3.9-5)  L  09/04/18  08:02    


 


Albumin/Globulin Ratio  0.8 %  09/04/18  08:02

## 2018-09-05 LAB
BASOPHILS # (AUTO): 0 K/MM3 (ref 0–0.1)
BASOPHILS NFR BLD AUTO: 1.1 % (ref 0–1.8)
BUN SERPL-MCNC: 30 MG/DL (ref 7–17)
BUN/CREAT SERPL: 33 %
CALCIUM SERPL-MCNC: 9 MG/DL (ref 8.4–10.2)
EOSINOPHIL # BLD AUTO: 0 K/MM3 (ref 0–0.4)
EOSINOPHIL NFR BLD AUTO: 0.3 % (ref 0–4.3)
HCT VFR BLD CALC: 42.2 % (ref 30.3–42.9)
HEMOLYSIS INDEX: 160
HGB BLD-MCNC: 14.2 GM/DL (ref 10.1–14.3)
LYMPHOCYTES # BLD AUTO: 0.7 K/MM3 (ref 1.2–5.4)
LYMPHOCYTES NFR BLD AUTO: 18 % (ref 13.4–35)
MCH RBC QN AUTO: 30 PG (ref 28–32)
MCHC RBC AUTO-ENTMCNC: 34 % (ref 30–34)
MCV RBC AUTO: 90 FL (ref 79–97)
MONOCYTES # (AUTO): 0.3 K/MM3 (ref 0–0.8)
MONOCYTES % (AUTO): 7.4 % (ref 0–7.3)
PLATELET # BLD: 190 K/MM3 (ref 140–440)
RBC # BLD AUTO: 4.69 M/MM3 (ref 3.65–5.03)

## 2018-09-05 PROCEDURE — 02HV33Z INSERTION OF INFUSION DEVICE INTO SUPERIOR VENA CAVA, PERCUTANEOUS APPROACH: ICD-10-PCS | Performed by: INTERNAL MEDICINE

## 2018-09-05 RX ADMIN — ONDANSETRON PRN MG: 2 INJECTION INTRAMUSCULAR; INTRAVENOUS at 15:56

## 2018-09-05 RX ADMIN — ASPIRIN SCH MG: 81 TABLET, COATED ORAL at 10:49

## 2018-09-05 RX ADMIN — GUAIFENESIN PRN MG: 100 SOLUTION ORAL at 03:23

## 2018-09-05 RX ADMIN — DOPAMINE HYDROCHLORIDE IN DEXTROSE SCH MLS/HR: 3.2 INJECTION, SOLUTION INTRAVENOUS at 15:37

## 2018-09-05 RX ADMIN — MIDODRINE HYDROCHLORIDE SCH MG: 5 TABLET ORAL at 10:50

## 2018-09-05 RX ADMIN — MIDODRINE HYDROCHLORIDE SCH MG: 5 TABLET ORAL at 21:55

## 2018-09-05 RX ADMIN — ONDANSETRON PRN MG: 2 INJECTION INTRAMUSCULAR; INTRAVENOUS at 10:45

## 2018-09-05 RX ADMIN — FAMOTIDINE SCH MG: 20 TABLET ORAL at 10:50

## 2018-09-05 RX ADMIN — FUROSEMIDE SCH MG: 10 INJECTION, SOLUTION INTRAVENOUS at 18:47

## 2018-09-05 RX ADMIN — FAMOTIDINE SCH MG: 20 TABLET ORAL at 21:55

## 2018-09-05 RX ADMIN — ENOXAPARIN SODIUM SCH MG: 100 INJECTION SUBCUTANEOUS at 21:55

## 2018-09-05 RX ADMIN — FUROSEMIDE SCH: 10 INJECTION, SOLUTION INTRAVENOUS at 06:43

## 2018-09-05 NOTE — CONSULTATION
History of Present Illness


Consult date: 09/05/18


Requesting physician: AMY J KOCHERLA


History of present illness: 





A pleasant 63-year-old -American female patient with significant history 

of congestive heart failure on anti-failure medications pulmonary hypertension


Recently evaluated by cardiology, status post heart cath, nonobstructive 

coronary disease and cardiomyopathy ejection fraction 30-35%


Patient claims compliments with all her medications


Complaint of worsening effort tolerance, orthopnea, worsening leg edema


Has mild congestion and cough, not see primary care physician, has seen 

[facility] cardiologist 2 weeks ago


Denies nausea vomiting or abdominal pain





Was admitted to the hospital, however this morning had a syncopal event with 

hypotension and increased work of breathing.


was transferred to the ICU for vasopressor/iontropic support, and supplemental 

oxygen.


Patient was seen and examined. Vitals, labs, medications, chart and imaging 

were reviewed








Past History


Past Medical History: heart failure, hypertension


Past Surgical History: Other (tubal ligation)


Social history: lives with family.  denies: smoking, alcohol abuse, 

prescription drug abuse


Family history: hypertension





Medications and Allergies


 Allergies











Allergy/AdvReac Type Severity Reaction Status Date / Time


 


Penicillins Allergy  Itching Verified 07/09/18 12:09











 Home Medications











 Medication  Instructions  Recorded  Confirmed  Last Taken  Type


 


Aspirin EC [Aspirin Enteric Coated 81 mg PO QDAY 07/08/18 09/02/18 09/01/18 

History





TAB]     


 


Furosemide [Lasix TAB] 40 mg PO QDAY #30 tablet 07/11/18 09/02/18 09/01/18 Rx


 


Losartan [Cozaar] 25 mg PO QDAY #30 tablet 07/11/18 09/02/18 09/01/18 Rx


 


Metoprolol [Lopressor TAB] 12.5 mg PO BID #30 tablet 07/11/18 09/02/18 09/01/18 

Rx


 


Spironolactone [Aldactone] 12.5 mg PO QDAY #30 tablet 07/11/18 09/02/18 09/01/ 18 Rx











Active Meds: 


Active Medications





Acetaminophen (Tylenol)  650 mg PO Q4H PRN


   PRN Reason: Pain, Mild (1-3)


   Last Admin: 09/03/18 16:50 Dose:  650 mg


Aspirin (Halfprin Ec)  81 mg PO QDAY [facility]


   Last Admin: 09/05/18 10:49 Dose:  81 mg


Atorvastatin Calcium (Lipitor)  40 mg PO QHS [facility]


   Last Admin: 09/04/18 21:00 Dose:  40 mg


Docusate Sodium (Colace)  100 mg PO BID PRN


   PRN Reason: Constipation


Enoxaparin Sodium (Lovenox)  80 mg 1 mg/kg (80 mg) SUB-Q Q12HR [facility]


Famotidine (Pepcid)  20 mg PO BID [facility]


   Last Admin: 09/05/18 10:50 Dose:  20 mg


Furosemide (Lasix)  20 mg IV 0600,1800 [facility]


   Last Admin: 09/05/18 06:43 Dose:  Not Given


Guaifenesin (Robitussin)  100 mg PO Q6H PRN


   PRN Reason: Cough


   Last Admin: 09/05/18 03:23 Dose:  100 mg


Dopamine HCl/Dextrose (Intropin Drip 800 Mg/D5w 250 Ml)  800 mg in 250 mls @ 

4.421 mls/hr IV TITR [facility]


   Last Admin: 09/05/18 15:37 Dose:  3 mcg/kg/min, 4.421 mls/hr


Midodrine (Proamatine)  10 mg PO BID [facility]


   Last Admin: 09/05/18 10:50 Dose:  10 mg


Morphine Sulfate (Morphine)  1 mg IV Q4H PRN


   PRN Reason: Pain, Moderate (4-6)


Ondansetron HCl (Zofran)  4 mg IV Q6HR PRN


   PRN Reason: Nausea And Vomiting


   Last Admin: 09/05/18 15:56 Dose:  4 mg


Zolpidem Tartrate (Ambien)  5 mg PO QHS PRN


   PRN Reason: Sleep











Physical Examination


Vital signs: 


 Vital Signs











Temp Pulse Resp BP Pulse Ox


 


 97.7 F   104 H  17   95/72   83 L


 


 08/31/18 23:04  08/31/18 23:04  08/31/18 23:04  08/31/18 23:04  08/31/18 23:04








General appearance: Present: moderate respiratroy distress, chronically ill 

looking,





- EENT


Eyes: Present: PERRL, EOM intact





- Neck


Neck: Present: supple, normal ROM





- Respiratory


Respiratory effort: normal


Respiratory: bilateral: diminished, rales, negative: rhonchi, wheezing





- Cardiovascular


Rhythm: regular


Heart Sounds: Present: S1 & S2





- Extremities


Extremities: no ischemia


Extremity abnormal: edema (bilateral 2+ pedal edema)





- Abdominal


General gastrointestinal: soft, non-tender, non-distended, normal bowel sounds





- Integumentary


Integumentary: Present: clear, warm





- Psychiatric


Psychiatric: appropriate mood/affect, cooperative





- Neurologic


Neurologic: CNII-XII intact, moves all extremities








Results





- Laboratory Findings


CBC and BMP: 


 09/13/18 05:00





 09/13/18 05:00


PT/INR, D-dimer











PT  16.7 Sec. (12.2-14.9)  H  08/31/18  23:23    


 


INR  1.30  (0.87-1.13)  H  08/31/18  23:23    


 


D-Dimer  1149.50 ng/mlDDU (0-234)  H  08/31/18  23:23    








Abnormal lab findings: 


 Abnormal Labs











  08/31/18 08/31/18 08/31/18





  23:23 23:23 23:23


 


WBC   


 


Hgb   14.9 H 


 


Hct   46.0 H 


 


RDW   18.0 H 


 


Lymph % (Auto)   


 


Mono % (Auto)   


 


Lymph #   


 


Seg Neutrophils %   


 


Seg Neutrophils #   


 


PT    16.7 H


 


INR    1.30 H


 


D-Dimer    1149.50 H


 


Sodium  136 L  


 


Chloride  94.8 L  


 


Carbon Dioxide   


 


BUN  36 H  


 


Glucose  192 H  


 


POC Glucose   


 


Total Bilirubin   


 


Direct Bilirubin   


 


NT-Pro-B Natriuret Pep  4255 H  


 


Albumin   


 


LDL Cholesterol Direct   














  08/31/18 09/01/18 09/02/18





  23:23 02:59 08:00


 


WBC   


 


Hgb   


 


Hct   


 


RDW   


 


Lymph % (Auto)   


 


Mono % (Auto)   


 


Lymph #   


 


Seg Neutrophils %   


 


Seg Neutrophils #   


 


PT   


 


INR   


 


D-Dimer   


 


Sodium    136 L


 


Chloride   


 


Carbon Dioxide   


 


BUN    34 H


 


Glucose    105 H


 


POC Glucose   186 H 


 


Total Bilirubin  1.80 H  


 


Direct Bilirubin  0.6 H  


 


NT-Pro-B Natriuret Pep   


 


Albumin  3.6 L  


 


LDL Cholesterol Direct   














  09/03/18 09/03/18 09/04/18





  07:36 07:36 08:02


 


WBC  3.7 L   3.1 L


 


Hgb  14.6 H  


 


Hct  43.5 H  


 


RDW  17.3 H   17.7 H


 


Lymph % (Auto)  39.9 H   42.9 H


 


Mono % (Auto)  9.0 H   8.3 H


 


Lymph #   


 


Seg Neutrophils %   


 


Seg Neutrophils #  1.7 L   1.4 L


 


PT   


 


INR   


 


D-Dimer   


 


Sodium   135 L 


 


Chloride   


 


Carbon Dioxide   21 L 


 


BUN   33 H 


 


Glucose   


 


POC Glucose   


 


Total Bilirubin   


 


Direct Bilirubin   


 


NT-Pro-B Natriuret Pep   


 


Albumin   


 


LDL Cholesterol Direct   














  09/04/18 09/04/18 09/05/18





  08:02 08:02 05:57


 


WBC   


 


Hgb   


 


Hct   


 


RDW   


 


Lymph % (Auto)   


 


Mono % (Auto)   


 


Lymph #   


 


Seg Neutrophils %   


 


Seg Neutrophils #   


 


PT   


 


INR   


 


D-Dimer   


 


Sodium    133 L


 


Chloride    97.4 L


 


Carbon Dioxide  21 L   17 L


 


BUN  32 H   30 H


 


Glucose    117 H


 


POC Glucose   


 


Total Bilirubin  1.30 H  


 


Direct Bilirubin   


 


NT-Pro-B Natriuret Pep   


 


Albumin  3.0 L  


 


LDL Cholesterol Direct   42 L 














  09/05/18





  05:57


 


WBC  4.0 L


 


Hgb 


 


Hct 


 


RDW  17.8 H


 


Lymph % (Auto) 


 


Mono % (Auto)  7.4 H


 


Lymph #  0.7 L


 


Seg Neutrophils %  73.2 H


 


Seg Neutrophils # 


 


PT 


 


INR 


 


D-Dimer 


 


Sodium 


 


Chloride 


 


Carbon Dioxide 


 


BUN 


 


Glucose 


 


POC Glucose 


 


Total Bilirubin 


 


Direct Bilirubin 


 


NT-Pro-B Natriuret Pep 


 


Albumin 


 


LDL Cholesterol Direct 














- Diagnostic Findings


Chest x-ray: image reviewed





Assessment and Plan


Syncope


Acute on chronic hypoxemic respiratory failure (2nd to CHF, fluid overload)


Orthopnea


Chronic Biventricular CHF with acute exacerbation


NICMOP


Paroxysmal Atrial Fibrillation


H/O Pulmonary HTN


PFO


Hypotension (h/o Hypertension)


Hypokalemia (likely due to diuretic)


Moderate protein calorie malnutrition





- continue full anticoagulation for A-fib


- continue supplemental oxygen to keep sats > 90%


- start BIPAP , IPAP 14/EPAP6, FIO2 60%, titrate to oxygen saturations>90%


- bronchodilators per protocol


-ABG now and prn


-optimize cardiac medications


- get am CXR


- gentle diuresis


- continue dopamine per cardiology recommendations


- aspiration precautions


- increase Pepcid to bid re: reflux symptoms


- PT/OT as tolerated


- mobility protocol for pressure ulcer prophylaxis


- target MAP > 65mmHg


- continue other care per attending / other consultants





The high probability of a clinically significant, sudden or life-threatening 

deterioration of the [cardiac, respiratory] system(s) required my full and 

direct attention, intervention and personal management. The aggregate critical 

care time was [40] minutes without overlap. Time includes spent on;





[x] Data Review and interpretation 





[x] Patient assessment and monitoring of vital signs 





[x] Documentation 





[x] Medication orders and management

## 2018-09-05 NOTE — XRAY REPORT
AP CHEST:



HISTORY: Right arm PICC placement



The right arm PICC terminates at the cavoatrial junction. The remainder 

of the examination is unchanged since earlier today at 1121 hrs.



IMPRESSION:

Adequate right arm PICC placement.

## 2018-09-05 NOTE — XRAY REPORT
AP CHEST:



HISTORY: Shortness of breath



Compared to 8/31/18. The interstitium appears slightly prominent. This 

could represent interstitial edema or early fibrotic changes. This is 

most pronounced at the lung bases. I suspect this could represent 

chronic interstitial changes or early fibrosis. There is no evidence 

for pneumonia, pleural effusion or pneumothorax. Heart and mediastinal 

structures are within normal limits.



IMPRESSION:

Slightly prominent interstitium as described. If further evaluation is 

needed, high resolution CT chest should provide the most information.

## 2018-09-05 NOTE — EVENT NOTE
Date: 09/05/18


Responded to code METS for episode of bradycardia and hypotension after return 

from bathroom - ? vasovagal episode. Pt alert on evaluation with BP 80s/50s and 

HR SR 80s. Pt to be tx to CCU. Initiate dopamine gtt. Pt has yet to receive IV 

amio that was ordered this AM. Will hold-off on amio initiation and this time 

and monitor closely. 





CAMILO SHANNON NP / DR. MARBELLA SINGH

## 2018-09-05 NOTE — PROGRESS NOTE
<SHANNONMCKENNA - Last Filed: 09/05/18 11:22>





Assessment and Plan


Cardiac medications on hold in setting of borderline hypotension.


D/c dobutamine in setting of hypotension and tachycardia noted overnight. 


Repeat CXR and pro-BNP. 


Initiate IV amiodarone and full dosage lovenox BID for systemic anticoagulation 

in setting of new onset paroxysmal atrial fibrillation. Consider conversion to 

OAC prior to hospital discharge. 


Continue diuresis as tolerated.  





The patient has been seen in conjunction with Dr. MARBELLA Rashid who agrees with the 

assessment and plan of care. 





- Patient Problems


(1) Biventricular congestive heart failure


Current Visit: Yes   Status: Acute   





(2) Nonischemic cardiomyopathy


Current Visit: Yes   Status: Chronic   





(3) Paroxysmal atrial fibrillation with RVR


Current Visit: Yes   Status: Acute   





(4) Cor pulmonale


Current Visit: Yes   Status: Chronic   





(5) Pulmonary HTN


Current Visit: Yes   Status: Chronic   





(6) Tricuspid regurgitation


Current Visit: Yes   Status: Chronic   





(7) PFO (patent foramen ovale)


Current Visit: Yes   Status: Chronic   





(8) Hypotension


Current Visit: Yes   Status: Chronic   





(9) Hypoxia


Current Visit: Yes   Status: Chronic   





Subjective


Date of service: 09/05/18


Principal diagnosis: HF


Interval history: 


pt resting in bed, c/o nausea and vomiting. Dobutamine gtt infusing. BPs low. 

tele reviewed - pt had bouts of apparent atrial fibrillation with RVR overnight

, currently in SR. BLE edema persists. no family at bedside. 








Objective





  Last Vital Signs











Temp  98.3 F   09/05/18 05:19


 


Pulse  98 H  09/05/18 04:11


 


Resp  20   09/05/18 05:19


 


BP  83/51   09/05/18 04:11


 


Pulse Ox  93   09/05/18 04:11

















- Physical Examination


General: No Apparent Distress


HEENT: Positive: PERRL, Normocephaly, Mucus Membranes Moist


Neck: Positive: neck supple, trachea midline


Cardiac: Positive: Reg Rate and Rhythm, S1/S2


Lungs: Positive: Decreased Breath Sounds


Neuro: Positive: Grossly Intact


Abdomen: Positive: Soft.  Negative: Tender


Skin: Negative: Wound


Extremities: Present: +2 Edema (BLE)





- Labs and Meds


 Lipids











  09/04/18 Range/Units





  08:02 


 


Triglycerides  54  (2-149)  mg/dL


 


Cholesterol  95  ()  mg/dL


 


HDL Cholesterol  49  (40-59)  mg/dL


 


Cholesterol/HDL Ratio  1.93  %








 CBC











  09/05/18 Range/Units





  05:57 


 


WBC  4.0 L  (4.5-11.0)  K/mm3


 


RBC  4.69  (3.65-5.03)  M/mm3


 


Hgb  14.2  (10.1-14.3)  gm/dl


 


Hct  42.2  (30.3-42.9)  %


 


Plt Count  190  (140-440)  K/mm3


 


Lymph #  0.7 L  (1.2-5.4)  K/mm3


 


Mono #  0.3  (0.0-0.8)  K/mm3


 


Eos #  0.0  (0.0-0.4)  K/mm3


 


Baso #  0.0  (0.0-0.1)  K/mm3








 Comprehensive Metabolic Panel











  09/05/18 Range/Units





  05:57 


 


Sodium  133 L  (137-145)  mmol/L


 


Potassium  4.6  (3.6-5.0)  mmol/L


 


Chloride  97.4 L  ()  mmol/L


 


Carbon Dioxide  17 L  (22-30)  mmol/L


 


BUN  30 H  (7-17)  mg/dL


 


Creatinine  0.9  (0.7-1.2)  mg/dL


 


Glucose  117 H  ()  mg/dL


 


Calcium  9.0  (8.4-10.2)  mg/dL














- Imaging and Cardiology


EKG: report reviewed, image reviewed


Echo: report reviewed (7/9/2018: VICENTE; EF 45-50%, marked RA enlargement, mod RV 

enlargement, mod RV systolic dysfunction, trace MR, mod to severe TR, mild pulm 

HTN with RVSP 38mmHg, mod left to right shunt suggestive of ostium secundum 

atrial septal defect)


Cardiac cath: report reviewed (7/10/2018: LHC & RHC, NO ASD, no O2 sat step-up, 

EF 35%, normal coronaries, mod pulmonary HTN. )





- Telemetry


EKG Rhythm: Sinus Rhythm





<MARVA RASHID - Last Filed: 09/05/18 11:53>





Assessment and Plan


discussed with Dr Kocherla


Prognosis is guarded








Objective


 Vital Signs











  Temp Pulse Pulse Resp Resp BP BP


 


 09/05/18 05:19  98.3 F    20   


 


 09/05/18 04:11   98 H     83/51 


 


 09/05/18 00:22  97.9 F    20   


 


 09/04/18 23:21   107 H     83/49 


 


 09/04/18 22:50       


 


 09/04/18 20:30   97 H     


 


 09/04/18 20:19      20  


 


 09/04/18 20:15    82  20   


 


 09/04/18 20:14  98.1 F    20   


 


 09/04/18 19:37   115 H     81/51 


 


 09/04/18 14:17  96.3 F L  78   16    86/63














  Pulse Ox


 


 09/05/18 05:19 


 


 09/05/18 04:11  93


 


 09/05/18 00:22 


 


 09/04/18 23:21  95


 


 09/04/18 22:50  95


 


 09/04/18 20:30 


 


 09/04/18 20:19 


 


 09/04/18 20:15  98


 


 09/04/18 20:14 


 


 09/04/18 19:37  97


 


 09/04/18 14:17  95














- Labs and Meds


 Lipids











  09/04/18 Range/Units





  08:02 


 


Triglycerides  54  (2-149)  mg/dL


 


Cholesterol  95  ()  mg/dL


 


HDL Cholesterol  49  (40-59)  mg/dL


 


Cholesterol/HDL Ratio  1.93  %








 CBC











  09/05/18 Range/Units





  05:57 


 


WBC  4.0 L  (4.5-11.0)  K/mm3


 


RBC  4.69  (3.65-5.03)  M/mm3


 


Hgb  14.2  (10.1-14.3)  gm/dl


 


Hct  42.2  (30.3-42.9)  %


 


Plt Count  190  (140-440)  K/mm3


 


Lymph #  0.7 L  (1.2-5.4)  K/mm3


 


Mono #  0.3  (0.0-0.8)  K/mm3


 


Eos #  0.0  (0.0-0.4)  K/mm3


 


Baso #  0.0  (0.0-0.1)  K/mm3








 Comprehensive Metabolic Panel











  09/05/18 Range/Units





  05:57 


 


Sodium  133 L  (137-145)  mmol/L


 


Potassium  4.6  (3.6-5.0)  mmol/L


 


Chloride  97.4 L  ()  mmol/L


 


Carbon Dioxide  17 L  (22-30)  mmol/L


 


BUN  30 H  (7-17)  mg/dL


 


Creatinine  0.9  (0.7-1.2)  mg/dL


 


Glucose  117 H  ()  mg/dL


 


Calcium  9.0  (8.4-10.2)  mg/dL

## 2018-09-06 LAB
BASOPHILS # (AUTO): 0.1 K/MM3 (ref 0–0.1)
BASOPHILS NFR BLD AUTO: 1.2 % (ref 0–1.8)
BUN SERPL-MCNC: 25 MG/DL (ref 7–17)
BUN/CREAT SERPL: 36 %
CALCIUM SERPL-MCNC: 9.1 MG/DL (ref 8.4–10.2)
EOSINOPHIL # BLD AUTO: 0.1 K/MM3 (ref 0–0.4)
EOSINOPHIL NFR BLD AUTO: 1.2 % (ref 0–4.3)
HCT VFR BLD CALC: 42.7 % (ref 30.3–42.9)
HEMOLYSIS INDEX: 2
HGB BLD-MCNC: 13.9 GM/DL (ref 10.1–14.3)
LYMPHOCYTES # BLD AUTO: 1 K/MM3 (ref 1.2–5.4)
LYMPHOCYTES NFR BLD AUTO: 20.8 % (ref 13.4–35)
MCH RBC QN AUTO: 30 PG (ref 28–32)
MCHC RBC AUTO-ENTMCNC: 33 % (ref 30–34)
MCV RBC AUTO: 93 FL (ref 79–97)
MONOCYTES # (AUTO): 0.3 K/MM3 (ref 0–0.8)
MONOCYTES % (AUTO): 6.4 % (ref 0–7.3)
PLATELET # BLD: 236 K/MM3 (ref 140–440)
RBC # BLD AUTO: 4.61 M/MM3 (ref 3.65–5.03)

## 2018-09-06 PROCEDURE — 5A09357 ASSISTANCE WITH RESPIRATORY VENTILATION, LESS THAN 24 CONSECUTIVE HOURS, CONTINUOUS POSITIVE AIRWAY PRESSURE: ICD-10-PCS | Performed by: INTERNAL MEDICINE

## 2018-09-06 RX ADMIN — MIDODRINE HYDROCHLORIDE SCH MG: 5 TABLET ORAL at 22:21

## 2018-09-06 RX ADMIN — FUROSEMIDE SCH MG: 10 INJECTION, SOLUTION INTRAVENOUS at 18:16

## 2018-09-06 RX ADMIN — ENOXAPARIN SODIUM SCH MG: 100 INJECTION SUBCUTANEOUS at 09:48

## 2018-09-06 RX ADMIN — ENOXAPARIN SODIUM SCH MG: 100 INJECTION SUBCUTANEOUS at 22:21

## 2018-09-06 RX ADMIN — FAMOTIDINE SCH MG: 20 TABLET ORAL at 09:47

## 2018-09-06 RX ADMIN — ASPIRIN SCH MG: 81 TABLET, COATED ORAL at 09:49

## 2018-09-06 RX ADMIN — MIDODRINE HYDROCHLORIDE SCH MG: 5 TABLET ORAL at 09:48

## 2018-09-06 RX ADMIN — FAMOTIDINE SCH MG: 20 TABLET ORAL at 22:22

## 2018-09-06 RX ADMIN — FUROSEMIDE SCH MG: 10 INJECTION, SOLUTION INTRAVENOUS at 07:47

## 2018-09-06 NOTE — PROGRESS NOTE
<SAINT-CONNIE,HURLINE - Last Filed: 09/06/18 14:46>





Assessment and Plan


Assessment and plan: 





1. chronic systolic CHF with acute exacerbation


      Non-ischemic cardiomyopathy, EF 45-50% (per heart cath in July 2018)


      BNP level 3881, Continue IV diuretics Lasix,  


      Monitor I&O, Low-sodium diet, fluid restriction


      Cardiology following


2. Acute on chronic respiratory failure (2nd to CHF, fluid overload)


      Currently on Bipap, O2 Sat stable


      Continue supportive care


      Pulmonary follow


3. Hypotension (h/o Hypertension)


      Dopamine infusing (renal dose) 


      Hold hypertensives medications


      Monitor BP, VS as per unit protocol


5. Hypokalemia (likely due to diuretic)


      Replace potassium per protocol


      Monitor K level


      Am BMP


6.  Moderate protein calorie malnutrition


      Supportive care and nutrition supplements


7.  DVT prophylaxis; on Lovenox


      Continue supportive care


      Patient's condition/ treatment plan discussed in detail with the patient 

and her nurse


 





History


Interval history: 





Pt is awake on Bipap, states that she is doing okay today





Hospitalist Physical





- Physical exam


Narrative exam: 





Pt is supine in bed, awake, A&Ox3 respond appropriately to question, 

respiration with mild labor (on bipap with FIO2 of 55, LEs with edema, abdomen 

size increase, petichea rash on abdomen noted, pt reports pruritus





- Constitutional


Vitals: 


 











Temp Pulse Resp BP Pulse Ox


 


 97 F L  96 H  24   97/67   98 


 


 09/06/18 04:00  09/06/18 09:31  09/06/18 09:31  09/06/18 09:31  09/06/18 09:34











General appearance: Present: mild distress, well-nourished





- EENT


Eyes: Present: PERRL, EOM intact





- Neck


Neck: Present: normal ROM





- Respiratory


Respiratory: bilateral: diminished, negative: other (shallow breathing)





- Cardiovascular


Heart Sounds: Present: systolic murmur





- Extremities


Extremity abnormal: edema (3+ pitting edema)





- Abdominal


General gastrointestinal: soft, normal bowel sounds (ingrease gut size)





- Integumentary


Integumentary: Present: rash (puritic Petichea rash noted on abdominal area, )





- Psychiatric


Psychiatric: cooperative





Results





- Labs


CBC & Chem 7: 


 09/06/18 05:15





 09/06/18 05:15


Labs: 


 Laboratory Last Values











WBC  4.9 K/mm3 (4.5-11.0)   09/06/18  05:15    


 


RBC  4.61 M/mm3 (3.65-5.03)   09/06/18  05:15    


 


Hgb  13.9 gm/dl (10.1-14.3)   09/06/18  05:15    


 


Hct  42.7 % (30.3-42.9)   09/06/18  05:15    


 


MCV  93 fl (79-97)   09/06/18  05:15    


 


MCH  30 pg (28-32)   09/06/18  05:15    


 


MCHC  33 % (30-34)   09/06/18  05:15    


 


RDW  16.9 % (13.2-15.2)  H  09/06/18  05:15    


 


Plt Count  236 K/mm3 (140-440)   09/06/18  05:15    


 


Lymph % (Auto)  20.8 % (13.4-35.0)   09/06/18  05:15    


 


Mono % (Auto)  6.4 % (0.0-7.3)   09/06/18  05:15    


 


Eos % (Auto)  1.2 % (0.0-4.3)   09/06/18  05:15    


 


Baso % (Auto)  1.2 % (0.0-1.8)   09/06/18  05:15    


 


Lymph #  1.0 K/mm3 (1.2-5.4)  L  09/06/18  05:15    


 


Mono #  0.3 K/mm3 (0.0-0.8)   09/06/18  05:15    


 


Eos #  0.1 K/mm3 (0.0-0.4)   09/06/18  05:15    


 


Baso #  0.1 K/mm3 (0.0-0.1)   09/06/18  05:15    


 


Seg Neutrophils %  70.4 % (40.0-70.0)  H  09/06/18  05:15    


 


Seg Neutrophils #  3.5 K/mm3 (1.8-7.7)   09/06/18  05:15    


 


PT  16.7 Sec. (12.2-14.9)  H  08/31/18  23:23    


 


INR  1.30  (0.87-1.13)  H  08/31/18  23:23    


 


APTT  29.2 Sec. (24.2-36.6)   08/31/18  23:23    


 


D-Dimer  1149.50 ng/mlDDU (0-234)  H  08/31/18  23:23    


 


POC ABG pH  7.437  (7.35-7.45)   09/06/18  08:28    


 


POC ABG pCO2  37.1  (35-45)   09/06/18  08:28    


 


POC ABG pO2  65  ()  L  09/06/18  08:28    


 


POC ABG HCO3  25.0   09/06/18  08:28    


 


POC ABG Total CO2  26   09/06/18  08:28    


 


POC ABG O2 Sat  93   09/06/18  08:28    


 


POC ABG Base Excess  1   09/06/18  08:28    


 


FiO2  75 %  09/06/18  08:28    


 


Sodium  139 mmol/L (137-145)   09/06/18  05:15    


 


Potassium  3.4 mmol/L (3.6-5.0)  L D 09/06/18  05:15    


 


Chloride  99.0 mmol/L ()   09/06/18  05:15    


 


Carbon Dioxide  25 mmol/L (22-30)  D 09/06/18  05:15    


 


Anion Gap  18 mmol/L  09/06/18  05:15    


 


BUN  25 mg/dL (7-17)  H  09/06/18  05:15    


 


Creatinine  0.7 mg/dL (0.7-1.2)   09/06/18  05:15    


 


Estimated GFR  > 60 ml/min  09/06/18  05:15    


 


BUN/Creatinine Ratio  36 %  09/06/18  05:15    


 


Glucose  122 mg/dL ()  H  09/06/18  05:15    


 


POC Glucose  186  ()  H  09/01/18  02:59    


 


Calcium  9.1 mg/dL (8.4-10.2)   09/06/18  05:15    


 


Phosphorus  3.00 mg/dL (2.5-4.5)   09/05/18  05:57    


 


Magnesium  2.20 mg/dL (1.7-2.3)   09/05/18  05:57    


 


Total Bilirubin  1.30 mg/dL (0.1-1.2)  H  09/04/18  08:02    


 


Direct Bilirubin  0.6 mg/dL (0-0.2)  H  08/31/18  23:23    


 


Indirect Bilirubin  1.2 mg/dL  08/31/18  23:23    


 


AST  22 units/L (5-40)   09/04/18  08:02    


 


ALT  27 units/L (7-56)   09/04/18  08:02    


 


Alkaline Phosphatase  103 units/L ()   09/04/18  08:02    


 


Total Creatine Kinase  59 units/L ()   09/05/18  17:05    


 


CK-MB (CK-2)  4.8 ng/mL (0.0-4.0)  H  09/05/18  17:05    


 


CK-MB (CK-2) Rel Index  8.1  (0-4)  H  09/05/18  17:05    


 


Troponin T  < 0.010 ng/mL (0.00-0.029)   09/05/18  17:05    


 


NT-Pro-B Natriuret Pep  3883 pg/mL (0-900)  H  09/05/18  17:05    


 


Total Protein  6.7 g/dL (6.3-8.2)   09/04/18  08:02    


 


Albumin  3.0 g/dL (3.9-5)  L  09/04/18  08:02    


 


Albumin/Globulin Ratio  0.8 %  09/04/18  08:02    


 


Triglycerides  54 mg/dL (2-149)   09/04/18  08:02    


 


Cholesterol  95 mg/dL ()   09/04/18  08:02    


 


LDL Cholesterol Direct  42 mg/dL ()  L  09/04/18  08:02    


 


HDL Cholesterol  49 mg/dL (40-59)   09/04/18  08:02    


 


Cholesterol/HDL Ratio  1.93 %  09/04/18  08:02    














<KOCHERLA,AMY J - Last Filed: 09/06/18 20:16>





Assessment and Plan


Assessment and plan: 





I saw and evaluated the patient. I agree with the findings and the plan of care 

as documented in the Nurse Practitioner's~note, with the following corrections 

and additions.


Patient seen and evaluated medical records reviewed


About documentation noted and agreed


Plan of care is reviewed the nurse practitioner.


Cardiology recommendations noted 





Hospitalist Physical





- Constitutional


Vitals: 


 











Temp Pulse Resp BP Pulse Ox


 


 97.5 F L  111 H  25 H  95/69   97 


 


 09/06/18 16:00  09/06/18 18:00  09/06/18 18:00  09/06/18 18:00  09/06/18 18:00














Results





- Labs


CBC & Chem 7: 


 09/06/18 05:15





 09/06/18 05:15


Labs: 


 Laboratory Last Values











WBC  4.9 K/mm3 (4.5-11.0)   09/06/18  05:15    


 


RBC  4.61 M/mm3 (3.65-5.03)   09/06/18  05:15    


 


Hgb  13.9 gm/dl (10.1-14.3)   09/06/18  05:15    


 


Hct  42.7 % (30.3-42.9)   09/06/18  05:15    


 


MCV  93 fl (79-97)   09/06/18  05:15    


 


MCH  30 pg (28-32)   09/06/18  05:15    


 


MCHC  33 % (30-34)   09/06/18  05:15    


 


RDW  16.9 % (13.2-15.2)  H  09/06/18  05:15    


 


Plt Count  236 K/mm3 (140-440)   09/06/18  05:15    


 


Lymph % (Auto)  20.8 % (13.4-35.0)   09/06/18  05:15    


 


Mono % (Auto)  6.4 % (0.0-7.3)   09/06/18  05:15    


 


Eos % (Auto)  1.2 % (0.0-4.3)   09/06/18  05:15    


 


Baso % (Auto)  1.2 % (0.0-1.8)   09/06/18  05:15    


 


Lymph #  1.0 K/mm3 (1.2-5.4)  L  09/06/18  05:15    


 


Mono #  0.3 K/mm3 (0.0-0.8)   09/06/18  05:15    


 


Eos #  0.1 K/mm3 (0.0-0.4)   09/06/18  05:15    


 


Baso #  0.1 K/mm3 (0.0-0.1)   09/06/18  05:15    


 


Seg Neutrophils %  70.4 % (40.0-70.0)  H  09/06/18  05:15    


 


Seg Neutrophils #  3.5 K/mm3 (1.8-7.7)   09/06/18  05:15    


 


PT  16.7 Sec. (12.2-14.9)  H  08/31/18  23:23    


 


INR  1.30  (0.87-1.13)  H  08/31/18  23:23    


 


APTT  29.2 Sec. (24.2-36.6)   08/31/18  23:23    


 


D-Dimer  1149.50 ng/mlDDU (0-234)  H  08/31/18  23:23    


 


POC ABG pH  7.437  (7.35-7.45)   09/06/18  08:28    


 


POC ABG pCO2  37.1  (35-45)   09/06/18  08:28    


 


POC ABG pO2  65  ()  L  09/06/18  08:28    


 


POC ABG HCO3  25.0   09/06/18  08:28    


 


POC ABG Total CO2  26   09/06/18  08:28    


 


POC ABG O2 Sat  93   09/06/18  08:28    


 


POC ABG Base Excess  1   09/06/18  08:28    


 


FiO2  75 %  09/06/18  08:28    


 


Sodium  139 mmol/L (137-145)   09/06/18  05:15    


 


Potassium  3.4 mmol/L (3.6-5.0)  L D 09/06/18  05:15    


 


Chloride  99.0 mmol/L ()   09/06/18  05:15    


 


Carbon Dioxide  25 mmol/L (22-30)  D 09/06/18  05:15    


 


Anion Gap  18 mmol/L  09/06/18  05:15    


 


BUN  25 mg/dL (7-17)  H  09/06/18  05:15    


 


Creatinine  0.7 mg/dL (0.7-1.2)   09/06/18  05:15    


 


Estimated GFR  > 60 ml/min  09/06/18  05:15    


 


BUN/Creatinine Ratio  36 %  09/06/18  05:15    


 


Glucose  122 mg/dL ()  H  09/06/18  05:15    


 


POC Glucose  186  ()  H  09/01/18  02:59    


 


Calcium  9.1 mg/dL (8.4-10.2)   09/06/18  05:15    


 


Phosphorus  3.00 mg/dL (2.5-4.5)   09/05/18  05:57    


 


Magnesium  2.20 mg/dL (1.7-2.3)   09/05/18  05:57    


 


Total Bilirubin  1.30 mg/dL (0.1-1.2)  H  09/04/18  08:02    


 


Direct Bilirubin  0.6 mg/dL (0-0.2)  H  08/31/18  23:23    


 


Indirect Bilirubin  1.2 mg/dL  08/31/18  23:23    


 


AST  22 units/L (5-40)   09/04/18  08:02    


 


ALT  27 units/L (7-56)   09/04/18  08:02    


 


Alkaline Phosphatase  103 units/L ()   09/04/18  08:02    


 


Total Creatine Kinase  59 units/L ()   09/05/18  17:05    


 


CK-MB (CK-2)  4.8 ng/mL (0.0-4.0)  H  09/05/18  17:05    


 


CK-MB (CK-2) Rel Index  8.1  (0-4)  H  09/05/18  17:05    


 


Troponin T  < 0.010 ng/mL (0.00-0.029)   09/05/18  17:05    


 


NT-Pro-B Natriuret Pep  3883 pg/mL (0-900)  H  09/05/18  17:05    


 


Total Protein  6.7 g/dL (6.3-8.2)   09/04/18  08:02    


 


Albumin  3.0 g/dL (3.9-5)  L  09/04/18  08:02    


 


Albumin/Globulin Ratio  0.8 %  09/04/18  08:02    


 


Triglycerides  54 mg/dL (2-149)   09/04/18  08:02    


 


Cholesterol  95 mg/dL ()   09/04/18  08:02    


 


LDL Cholesterol Direct  42 mg/dL ()  L  09/04/18  08:02    


 


HDL Cholesterol  49 mg/dL (40-59)   09/04/18  08:02    


 


Cholesterol/HDL Ratio  1.93 %  09/04/18  08:02

## 2018-09-06 NOTE — PROGRESS NOTE
Assessment and Plan








Syncope


Acute on chronic respiratory failure (2nd to CHF, fluid overload)


Orthopnea


Chronic Biventricular CHF with acute exacerbation


NICMOP


Paroxysmal Atrial Fibrillation


H/O Pulmonary HTN


PFO


Hypotension (h/o Hypertension)


Hypokalemia (likely due to diuretic)


Moderate protein calorie malnutrition





- continue full anticoagulation for A-fib


- continue supplemental oxygen to keep sats > 90%


- continue BIPAP but increased settings to 14/10


- bronchodilators with 


- get am CXR


- gentle diuresis


- continue dopamine per cardiology recommendations


- aspiration precautions


- increase Pepcid to bid re: reflux symptoms


- PT/OT as tolerated


- mobility protocol for pressure ulcer prophylaxis


- target MAP > 65mmHg


- continue other care per attending / other consultants





The high probability of a clinically significant, sudden or life-threatening 

deterioration of the [cardiac, respiratory] system(s) required my full and 

direct attention, intervention and personal management. The aggregate critical 

care time was [40] minutes without overlap. Time includes spent on;





[x] Data Review and interpretation 





[x] Patient assessment and monitoring of vital signs 





[x] Documentation 





[x] Medication orders and management














Subjective


Date of service: 09/06/18


Principal diagnosis: Acute on Chronic Hypoxemic Respiratory Failure; AE-CHF; 

CMOP; Hypotension


Interval history: 





Patient is seen today for: Acute on Chronic Hypoxemic Respiratory Failure; AE-

CHF; CMOP; Hypotension; Symptomatic Bradycardia





Seen and examined at bedside; 24hour events reviewed; nursing and respiratory 

care staff consulted; no adverse overnight events reported to me; remains on 

contionuous BIPAP; FiO2 at 70%; resting peacefully in bed; denies acute chest 

pains or palpitations; poor appetite; No N&V





Objective


 Vital Signs - 12hr











  09/05/18 09/05/18 09/06/18





  23:41 23:51 00:00


 


Temperature   97 F L


 


Pulse Rate 91 H 89 88


 


Pulse Rate [   95 H





Apical]   


 


Respiratory 22 19 19





Rate   


 


Blood Pressure 118/86 121/87 121/87


 


O2 Sat by Pulse 97 94 93





Oximetry   














  09/06/18 09/06/18 09/06/18





  00:11 00:21 00:30


 


Temperature   


 


Pulse Rate 91 H 92 H 93 H


 


Pulse Rate [   





Apical]   


 


Respiratory 20 19 20





Rate   


 


Blood Pressure 121/87 116/84 109/77


 


O2 Sat by Pulse 91 91 92





Oximetry   














  09/06/18 09/06/18 09/06/18





  00:41 00:51 01:00


 


Temperature   


 


Pulse Rate 89 92 H 100 H


 


Pulse Rate [   





Apical]   


 


Respiratory 18 19 23





Rate   


 


Blood Pressure 116/84 109/82 110/83


 


O2 Sat by Pulse 90 90 





Oximetry   














  09/06/18 09/06/18 09/06/18





  01:11 01:20 01:30


 


Temperature   


 


Pulse Rate 99 H 105 H 109 H


 


Pulse Rate [   





Apical]   


 


Respiratory 21 20 17





Rate   


 


Blood Pressure 110/83 114/85 114/85


 


O2 Sat by Pulse 90 100 97





Oximetry   














  09/06/18 09/06/18 09/06/18





  01:41 01:51 02:00


 


Temperature   


 


Pulse Rate 86 88 90


 


Pulse Rate [   





Apical]   


 


Respiratory 19 18 25 H





Rate   


 


Blood Pressure 114/85 109/83 109/78


 


O2 Sat by Pulse 94 93 93





Oximetry   














  09/06/18 09/06/18 09/06/18





  02:30 03:00 03:30


 


Temperature   


 


Pulse Rate 94 H 91 H 88


 


Pulse Rate [   





Apical]   


 


Respiratory 25 H 21 21





Rate   


 


Blood Pressure 104/75 106/77 101/73


 


O2 Sat by Pulse  97 95





Oximetry   














  09/06/18 09/06/18 09/06/18





  04:00 04:30 05:01


 


Temperature 97 F L  


 


Pulse Rate 90 89 109 H


 


Pulse Rate [ 96 H  





Apical]   


 


Respiratory 21 23 21





Rate   


 


Blood Pressure 102/74 104/77 102/74


 


O2 Sat by Pulse 95  99





Oximetry   














  09/06/18 09/06/18 09/06/18





  05:30 06:00 06:30


 


Temperature   


 


Pulse Rate 107 H 92 H 84


 


Pulse Rate [   





Apical]   


 


Respiratory 12 24 18





Rate   


 


Blood Pressure 95/71 108/76 103/74


 


O2 Sat by Pulse  93 





Oximetry   














  09/06/18 09/06/18 09/06/18





  07:00 07:30 07:58


 


Temperature   


 


Pulse Rate 92 H 91 H 102 H


 


Pulse Rate [   





Apical]   


 


Respiratory 21 22 31 H





Rate   


 


Blood Pressure 108/75 103/74 102/71


 


O2 Sat by Pulse  93 99





Oximetry   














  09/06/18 09/06/18 09/06/18





  08:00 08:29 09:31


 


Temperature   


 


Pulse Rate 103 H 100 H 96 H


 


Pulse Rate [   





Apical]   


 


Respiratory 19 26 H 24





Rate   


 


Blood Pressure 98/77 97/71 97/67


 


O2 Sat by Pulse 100 94 99





Oximetry   














  09/06/18





  09:34


 


Temperature 


 


Pulse Rate 


 


Pulse Rate [ 





Apical] 


 


Respiratory 





Rate 


 


Blood Pressure 


 


O2 Sat by Pulse 98





Oximetry 











Constitutional: alert, appears uncomfortable, other (elderly looking AAF, 

normocephalic and atraumatic with increased respiratory effort)


Eyes: non-icteric


ENT: oropharynx moist, other ([place] 3)


Neck: supple, no lymphadenopathy, other (No thyromegaly)


Effort: mildly labored


Ascultation: Bilateral: diminished breath sounds, rales (inspiratory in bases)


Percussion: Bilateral: not dull


Cardiovascular: irregular rhythm, other (No R/M)


Gastrointestinal: normoactive bowel sounds, soft, non-tender, non-distended, 

other (No HSM)


Integumentary: rash


Extremities: no cyanosis, pulses normal, no ischemia or petechiae, edema (2++)


Neurologic: normal mental status, non-focal exam, pupils equal and round, motor 

strength normal and


Psychiatric: mood appropriate, depressed (affect)


CBC and BMP: 


 09/07/18 04:37





 09/07/18 04:37


ABG, PT/INR, D-dimer: 


ABG











POC ABG pH  7.437  (7.35-7.45)   09/06/18  08:28    


 


POC ABG pCO2  37.1  (35-45)   09/06/18  08:28    


 


POC ABG pO2  65  ()  L  09/06/18  08:28    


 


POC ABG HCO3  25.0   09/06/18  08:28    


 


POC ABG Total CO2  26   09/06/18  08:28    


 


POC ABG O2 Sat  93   09/06/18  08:28    





PT/INR, D-dimer











PT  16.7 Sec. (12.2-14.9)  H  08/31/18  23:23    


 


INR  1.30  (0.87-1.13)  H  08/31/18  23:23    


 


D-Dimer  1149.50 ng/mlDDU (0-234)  H  08/31/18  23:23    








Abnormal lab findings: 


 Abnormal Labs











  08/31/18 08/31/18 08/31/18





  23:23 23:23 23:23


 


WBC   


 


Hgb   14.9 H 


 


Hct   46.0 H 


 


RDW   18.0 H 


 


Lymph % (Auto)   


 


Mono % (Auto)   


 


Lymph #   


 


Seg Neutrophils %   


 


Seg Neutrophils #   


 


PT    16.7 H


 


INR    1.30 H


 


D-Dimer    1149.50 H


 


POC ABG pO2   


 


Sodium  136 L  


 


Potassium   


 


Chloride  94.8 L  


 


Carbon Dioxide   


 


BUN  36 H  


 


Glucose  192 H  


 


POC Glucose   


 


Total Bilirubin   


 


Direct Bilirubin   


 


CK-MB (CK-2)   


 


CK-MB (CK-2) Rel Index   


 


NT-Pro-B Natriuret Pep  4255 H  


 


Albumin   


 


LDL Cholesterol Direct   














  08/31/18 09/01/18 09/02/18





  23:23 02:59 08:00


 


WBC   


 


Hgb   


 


Hct   


 


RDW   


 


Lymph % (Auto)   


 


Mono % (Auto)   


 


Lymph #   


 


Seg Neutrophils %   


 


Seg Neutrophils #   


 


PT   


 


INR   


 


D-Dimer   


 


POC ABG pO2   


 


Sodium    136 L


 


Potassium   


 


Chloride   


 


Carbon Dioxide   


 


BUN    34 H


 


Glucose    105 H


 


POC Glucose   186 H 


 


Total Bilirubin  1.80 H  


 


Direct Bilirubin  0.6 H  


 


CK-MB (CK-2)   


 


CK-MB (CK-2) Rel Index   


 


NT-Pro-B Natriuret Pep   


 


Albumin  3.6 L  


 


LDL Cholesterol Direct   














  09/03/18 09/03/18 09/04/18





  07:36 07:36 08:02


 


WBC  3.7 L   3.1 L


 


Hgb  14.6 H  


 


Hct  43.5 H  


 


RDW  17.3 H   17.7 H


 


Lymph % (Auto)  39.9 H   42.9 H


 


Mono % (Auto)  9.0 H   8.3 H


 


Lymph #   


 


Seg Neutrophils %   


 


Seg Neutrophils #  1.7 L   1.4 L


 


PT   


 


INR   


 


D-Dimer   


 


POC ABG pO2   


 


Sodium   135 L 


 


Potassium   


 


Chloride   


 


Carbon Dioxide   21 L 


 


BUN   33 H 


 


Glucose   


 


POC Glucose   


 


Total Bilirubin   


 


Direct Bilirubin   


 


CK-MB (CK-2)   


 


CK-MB (CK-2) Rel Index   


 


NT-Pro-B Natriuret Pep   


 


Albumin   


 


LDL Cholesterol Direct   














  09/04/18 09/04/18 09/05/18





  08:02 08:02 05:57


 


WBC   


 


Hgb   


 


Hct   


 


RDW   


 


Lymph % (Auto)   


 


Mono % (Auto)   


 


Lymph #   


 


Seg Neutrophils %   


 


Seg Neutrophils #   


 


PT   


 


INR   


 


D-Dimer   


 


POC ABG pO2   


 


Sodium    133 L


 


Potassium   


 


Chloride    97.4 L


 


Carbon Dioxide  21 L   17 L


 


BUN  32 H   30 H


 


Glucose    117 H


 


POC Glucose   


 


Total Bilirubin  1.30 H  


 


Direct Bilirubin   


 


CK-MB (CK-2)   


 


CK-MB (CK-2) Rel Index   


 


NT-Pro-B Natriuret Pep   


 


Albumin  3.0 L  


 


LDL Cholesterol Direct   42 L 














  09/05/18 09/05/18 09/05/18





  05:57 17:05 17:05


 


WBC  4.0 L  


 


Hgb   


 


Hct   


 


RDW  17.8 H  


 


Lymph % (Auto)   


 


Mono % (Auto)  7.4 H  


 


Lymph #  0.7 L  


 


Seg Neutrophils %  73.2 H  


 


Seg Neutrophils #   


 


PT   


 


INR   


 


D-Dimer   


 


POC ABG pO2   


 


Sodium   


 


Potassium   


 


Chloride   


 


Carbon Dioxide   


 


BUN   


 


Glucose   


 


POC Glucose   


 


Total Bilirubin   


 


Direct Bilirubin   


 


CK-MB (CK-2)    4.8 H


 


CK-MB (CK-2) Rel Index    8.1 H


 


NT-Pro-B Natriuret Pep   3883 H 


 


Albumin   


 


LDL Cholesterol Direct   














  09/06/18 09/06/18 09/06/18





  05:15 05:15 08:28


 


WBC   


 


Hgb   


 


Hct   


 


RDW  16.9 H  


 


Lymph % (Auto)   


 


Mono % (Auto)   


 


Lymph #  1.0 L  


 


Seg Neutrophils %  70.4 H  


 


Seg Neutrophils #   


 


PT   


 


INR   


 


D-Dimer   


 


POC ABG pO2    65 L


 


Sodium   


 


Potassium   3.4 L D 


 


Chloride   


 


Carbon Dioxide   


 


BUN   25 H 


 


Glucose   122 H 


 


POC Glucose   


 


Total Bilirubin   


 


Direct Bilirubin   


 


CK-MB (CK-2)   


 


CK-MB (CK-2) Rel Index   


 


NT-Pro-B Natriuret Pep   


 


Albumin   


 


LDL Cholesterol Direct   











Chest x-ray: pending


Allied health notes reviewed: nursing

## 2018-09-06 NOTE — PROGRESS NOTE
Assessment and Plan


Assessment and plan: 


--Hypotension; blood pressures are still in the lower range, secondary to 

congestive heart failure


Hold antihypertensives, Lasix as tolerated, On Midodrin 10 mg twice a day, 


Patient was on dobutamine drip, however had cardiac arrhythmia, dobutamine 

discontinued





--New onset A. fib with rapid ventricular rate; paroxysmal


Cardiology considering amiodarone, full dose anticoagulation





--Acute on chronic systolic congestive heart failure;


Ejection fraction 45-50%, oxygen titrated to O2 sats more than 90%


Patient is hypotensive ,hold IV diuretics, beta blockers, ace inhibitors doses, 


input output monitoring, Low-sodium diet.





--Acute on chronic respiratory failure; mild improvement


Secondary to acute exacerbation of congestive heart failure


oxygen titrated O2 sats more than 90%





--History of Hypertension; she is currently hypotensive, hold all BP meds





--Mild malnutrition; supportive care and nutrition supplements





--DVT prophylaxis; Lovenox





--Full CODE STATUS





Physical therapy occupational therapy 


DC planning; possible home with home health and medically stable





Transfer the patient to telemetry for possible dobutamine drip per cardiology








Hospitalist Physical





- Constitutional


Vitals: 


 











Temp Pulse Resp BP Pulse Ox


 


 97.5 F L  111 H  25 H  95/69   97 


 


 09/06/18 16:00  09/06/18 18:00  09/06/18 18:00  09/06/18 18:00  09/06/18 18:00











General appearance: Present: mild distress, well-nourished





Results





- Labs


CBC & Chem 7: 


 09/06/18 05:15





 09/06/18 05:15


Labs: 


 Laboratory Last Values











WBC  4.9 K/mm3 (4.5-11.0)   09/06/18  05:15    


 


RBC  4.61 M/mm3 (3.65-5.03)   09/06/18  05:15    


 


Hgb  13.9 gm/dl (10.1-14.3)   09/06/18  05:15    


 


Hct  42.7 % (30.3-42.9)   09/06/18  05:15    


 


MCV  93 fl (79-97)   09/06/18  05:15    


 


MCH  30 pg (28-32)   09/06/18  05:15    


 


MCHC  33 % (30-34)   09/06/18  05:15    


 


RDW  16.9 % (13.2-15.2)  H  09/06/18  05:15    


 


Plt Count  236 K/mm3 (140-440)   09/06/18  05:15    


 


Lymph % (Auto)  20.8 % (13.4-35.0)   09/06/18  05:15    


 


Mono % (Auto)  6.4 % (0.0-7.3)   09/06/18  05:15    


 


Eos % (Auto)  1.2 % (0.0-4.3)   09/06/18  05:15    


 


Baso % (Auto)  1.2 % (0.0-1.8)   09/06/18  05:15    


 


Lymph #  1.0 K/mm3 (1.2-5.4)  L  09/06/18  05:15    


 


Mono #  0.3 K/mm3 (0.0-0.8)   09/06/18  05:15    


 


Eos #  0.1 K/mm3 (0.0-0.4)   09/06/18  05:15    


 


Baso #  0.1 K/mm3 (0.0-0.1)   09/06/18  05:15    


 


Seg Neutrophils %  70.4 % (40.0-70.0)  H  09/06/18  05:15    


 


Seg Neutrophils #  3.5 K/mm3 (1.8-7.7)   09/06/18  05:15    


 


PT  16.7 Sec. (12.2-14.9)  H  08/31/18  23:23    


 


INR  1.30  (0.87-1.13)  H  08/31/18  23:23    


 


APTT  29.2 Sec. (24.2-36.6)   08/31/18  23:23    


 


D-Dimer  1149.50 ng/mlDDU (0-234)  H  08/31/18  23:23    


 


POC ABG pH  7.437  (7.35-7.45)   09/06/18  08:28    


 


POC ABG pCO2  37.1  (35-45)   09/06/18  08:28    


 


POC ABG pO2  65  ()  L  09/06/18  08:28    


 


POC ABG HCO3  25.0   09/06/18  08:28    


 


POC ABG Total CO2  26   09/06/18  08:28    


 


POC ABG O2 Sat  93   09/06/18  08:28    


 


POC ABG Base Excess  1   09/06/18  08:28    


 


FiO2  75 %  09/06/18  08:28    


 


Sodium  139 mmol/L (137-145)   09/06/18  05:15    


 


Potassium  3.4 mmol/L (3.6-5.0)  L D 09/06/18  05:15    


 


Chloride  99.0 mmol/L ()   09/06/18  05:15    


 


Carbon Dioxide  25 mmol/L (22-30)  D 09/06/18  05:15    


 


Anion Gap  18 mmol/L  09/06/18  05:15    


 


BUN  25 mg/dL (7-17)  H  09/06/18  05:15    


 


Creatinine  0.7 mg/dL (0.7-1.2)   09/06/18  05:15    


 


Estimated GFR  > 60 ml/min  09/06/18  05:15    


 


BUN/Creatinine Ratio  36 %  09/06/18  05:15    


 


Glucose  122 mg/dL ()  H  09/06/18  05:15    


 


POC Glucose  186  ()  H  09/01/18  02:59    


 


Calcium  9.1 mg/dL (8.4-10.2)   09/06/18  05:15    


 


Phosphorus  3.00 mg/dL (2.5-4.5)   09/05/18  05:57    


 


Magnesium  2.20 mg/dL (1.7-2.3)   09/05/18  05:57    


 


Total Bilirubin  1.30 mg/dL (0.1-1.2)  H  09/04/18  08:02    


 


Direct Bilirubin  0.6 mg/dL (0-0.2)  H  08/31/18  23:23    


 


Indirect Bilirubin  1.2 mg/dL  08/31/18  23:23    


 


AST  22 units/L (5-40)   09/04/18  08:02    


 


ALT  27 units/L (7-56)   09/04/18  08:02    


 


Alkaline Phosphatase  103 units/L ()   09/04/18  08:02    


 


Total Creatine Kinase  59 units/L ()   09/05/18  17:05    


 


CK-MB (CK-2)  4.8 ng/mL (0.0-4.0)  H  09/05/18  17:05    


 


CK-MB (CK-2) Rel Index  8.1  (0-4)  H  09/05/18  17:05    


 


Troponin T  < 0.010 ng/mL (0.00-0.029)   09/05/18  17:05    


 


NT-Pro-B Natriuret Pep  3883 pg/mL (0-900)  H  09/05/18  17:05    


 


Total Protein  6.7 g/dL (6.3-8.2)   09/04/18  08:02    


 


Albumin  3.0 g/dL (3.9-5)  L  09/04/18  08:02    


 


Albumin/Globulin Ratio  0.8 %  09/04/18  08:02    


 


Triglycerides  54 mg/dL (2-149)   09/04/18  08:02    


 


Cholesterol  95 mg/dL ()   09/04/18  08:02    


 


LDL Cholesterol Direct  42 mg/dL ()  L  09/04/18  08:02    


 


HDL Cholesterol  49 mg/dL (40-59)   09/04/18  08:02    


 


Cholesterol/HDL Ratio  1.93 %  09/04/18  08:02

## 2018-09-06 NOTE — PROGRESS NOTE
Assessment and Plan


Pt appears to be clinically improving. Cont dopamine gtt. Continue diuresis as 

tolerated. Obtain strict I&Os and daily weights. 


No BB and/or ACEI/ARB at this time in setting of low BPs. 


Replete K+. 


Cont full dosage lovenox BID for systemic anticoagulation in setting of 

recently noted paroxysmal atrial fibrillation. Consider conversion to OAC prior 

to hospital discharge. 





The patient has been seen in conjunction with Dr. MARBELLA Rashid who agrees with the 

assessment and plan of care. 





- Patient Problems


(1) Biventricular congestive heart failure


Current Visit: Yes   Status: Acute   





(2) Nonischemic cardiomyopathy


Current Visit: Yes   Status: Chronic   





(3) Acute respiratory failure


Current Visit: Yes   Status: Acute   





(4) Paroxysmal atrial fibrillation with RVR


Current Visit: Yes   Status: Acute   





(5) Pulmonary HTN


Current Visit: Yes   Status: Chronic   





(6) Tricuspid regurgitation


Current Visit: Yes   Status: Chronic   





(7) PFO (patent foramen ovale)


Current Visit: Yes   Status: Chronic   





(8) Hypotension


Current Visit: Yes   Status: Chronic   





(9) Hypoxia


Current Visit: Yes   Status: Chronic   





Subjective


Date of service: 09/06/18


Principal diagnosis: HF


Interval history: 


pt resting in bed, states she is feeling better today, n/v currently resolved. 

on BiPAP. BLE edema improving. Dopamine gtt infusing. BPs improving. tele 

reviewed - pt was in SR overnight with no tachyarrhythmias noted. family member 

at bedside. 








Objective





  Last Vital Signs











Temp  97 F L  09/06/18 04:00


 


Pulse  100 H  09/06/18 08:29


 


Resp  26 H  09/06/18 08:29


 


BP  97/71   09/06/18 08:29


 


Pulse Ox  94   09/06/18 08:29

















- Physical Examination


General: Other (on BiPAP)


HEENT: Positive: PERRL, Normocephaly, Mucus Membranes Moist


Neck: Positive: neck supple, trachea midline


Cardiac: Positive: Reg Rate and Rhythm, S1/S2, Systolic Murmur


Lungs: Positive: Decreased Breath Sounds


Neuro: Positive: Grossly Intact


Abdomen: Positive: Soft.  Negative: Tender


Skin: Negative: Wound


Extremities: Present: +2 Edema (BLE)





- Labs and Meds


 Cardiac Enzymes











  09/05/18 Range/Units





  17:05 


 


CK-MB (CK-2)  4.8 H  (0.0-4.0)  ng/mL








 CBC











  09/06/18 Range/Units





  05:15 


 


WBC  4.9  (4.5-11.0)  K/mm3


 


RBC  4.61  (3.65-5.03)  M/mm3


 


Hgb  13.9  (10.1-14.3)  gm/dl


 


Hct  42.7  (30.3-42.9)  %


 


Plt Count  236  (140-440)  K/mm3


 


Lymph #  1.0 L  (1.2-5.4)  K/mm3


 


Mono #  0.3  (0.0-0.8)  K/mm3


 


Eos #  0.1  (0.0-0.4)  K/mm3


 


Baso #  0.1  (0.0-0.1)  K/mm3








 Comprehensive Metabolic Panel











  09/06/18 Range/Units





  05:15 


 


Sodium  139  (137-145)  mmol/L


 


Potassium  3.4 L D  (3.6-5.0)  mmol/L


 


Chloride  99.0  ()  mmol/L


 


Carbon Dioxide  25  D  (22-30)  mmol/L


 


BUN  25 H  (7-17)  mg/dL


 


Creatinine  0.7  (0.7-1.2)  mg/dL


 


Glucose  122 H  ()  mg/dL


 


Calcium  9.1  (8.4-10.2)  mg/dL














- Imaging and Cardiology


EKG: report reviewed, image reviewed


Echo: report reviewed (7/9/2018: VICENTE; EF 45-50%, marked RA enlargement, mod RV 

enlargement, mod RV systolic dysfunction, trace MR, mod to severe TR, mild pulm 

HTN with RVSP 38mmHg, mod left to right shunt suggestive of ostium secundum 

atrial septal defect)


Cardiac cath: report reviewed (7/10/2018: LHC & RHC, NO ASD, no O2 sat step-up, 

EF 35%, normal coronaries, mod pulmonary HTN. )





- Telemetry


EKG Rhythm: Sinus Rhythm

## 2018-09-07 LAB
ALBUMIN SERPL-MCNC: 3.1 G/DL (ref 3.9–5)
ALT SERPL-CCNC: 23 UNITS/L (ref 7–56)
BASOPHILS # (AUTO): 0.1 K/MM3 (ref 0–0.1)
BILIRUB DIRECT SERPL-MCNC: 1 MG/DL (ref 0–0.2)
BUN SERPL-MCNC: 26 MG/DL (ref 7–17)
BUN/CREAT SERPL: 29 %
CALCIUM SERPL-MCNC: 9.1 MG/DL (ref 8.4–10.2)
EOSINOPHIL # BLD AUTO: 0 K/MM3 (ref 0–0.4)
EOSINOPHIL NFR BLD AUTO: 0.7 % (ref 0–4.3)
HCT VFR BLD CALC: 42.6 % (ref 30.3–42.9)
HEMOLYSIS INDEX: 15
HGB BLD-MCNC: 13.9 GM/DL (ref 10.1–14.3)
LYMPHOCYTES # BLD AUTO: 1 K/MM3 (ref 1.2–5.4)
LYMPHOCYTES NFR BLD AUTO: 20.7 % (ref 13.4–35)
MCH RBC QN AUTO: 30 PG (ref 28–32)
MCHC RBC AUTO-ENTMCNC: 33 % (ref 30–34)
MCV RBC AUTO: 91 FL (ref 79–97)
MONOCYTES # (AUTO): 0.3 K/MM3 (ref 0–0.8)
MONOCYTES % (AUTO): 6.8 % (ref 0–7.3)
PLATELET # BLD: 231 K/MM3 (ref 140–440)
RBC # BLD AUTO: 4.68 M/MM3 (ref 3.65–5.03)

## 2018-09-07 PROCEDURE — 4A033R1 MEASUREMENT OF ARTERIAL SATURATION, PERIPHERAL, PERCUTANEOUS APPROACH: ICD-10-PCS | Performed by: INTERNAL MEDICINE

## 2018-09-07 PROCEDURE — 5A09357 ASSISTANCE WITH RESPIRATORY VENTILATION, LESS THAN 24 CONSECUTIVE HOURS, CONTINUOUS POSITIVE AIRWAY PRESSURE: ICD-10-PCS | Performed by: INTERNAL MEDICINE

## 2018-09-07 RX ADMIN — FAMOTIDINE SCH MG: 20 TABLET ORAL at 10:02

## 2018-09-07 RX ADMIN — DOPAMINE HYDROCHLORIDE IN DEXTROSE SCH MLS/HR: 3.2 INJECTION, SOLUTION INTRAVENOUS at 13:57

## 2018-09-07 RX ADMIN — ONDANSETRON PRN MG: 2 INJECTION INTRAMUSCULAR; INTRAVENOUS at 10:10

## 2018-09-07 RX ADMIN — FUROSEMIDE SCH MG: 10 INJECTION, SOLUTION INTRAVENOUS at 06:50

## 2018-09-07 RX ADMIN — MIDODRINE HYDROCHLORIDE SCH MG: 5 TABLET ORAL at 21:48

## 2018-09-07 RX ADMIN — FAMOTIDINE SCH MG: 20 TABLET ORAL at 21:48

## 2018-09-07 RX ADMIN — FUROSEMIDE SCH MG: 10 INJECTION, SOLUTION INTRAVENOUS at 19:21

## 2018-09-07 RX ADMIN — ZOLPIDEM TARTRATE PRN MG: 5 TABLET ORAL at 21:48

## 2018-09-07 RX ADMIN — ASPIRIN SCH MG: 81 TABLET, COATED ORAL at 10:02

## 2018-09-07 RX ADMIN — ENOXAPARIN SODIUM SCH MG: 100 INJECTION SUBCUTANEOUS at 10:03

## 2018-09-07 RX ADMIN — ENOXAPARIN SODIUM SCH MG: 100 INJECTION SUBCUTANEOUS at 21:48

## 2018-09-07 RX ADMIN — MIDODRINE HYDROCHLORIDE SCH MG: 5 TABLET ORAL at 10:02

## 2018-09-07 NOTE — PROGRESS NOTE
Assessment and Plan








Syncope


Acute on chronic respiratory failure (2nd to CHF, fluid overload)


Orthopnea


Chronic Biventricular CHF with acute exacerbation


NICMOP


Paroxysmal Atrial Fibrillation


H/O Pulmonary HTN


PFO


Hypotension (h/o Hypertension)


Hypokalemia (likely due to diuretic)


Moderate protein calorie malnutrition





- continue full anticoagulation for A-fib


- continue supplemental oxygen to keep sats > 90% (HFNC)


- continue BIPAP at  increased settings of 14/10 (Scheduled qhs)


- continue bronchodilators with pulmonary hygiene per RT


- follow  am CXR


- continue diuresis


- continue dopamine per cardiology recommendations


- continue aspiration precautions


- increase Pepcid to bid re: reflux symptoms


- PT/OT as tolerated


- mobility protocol for pressure ulcer prophylaxis


- target MAP > 65mmHg


- continue other care per attending / other consultants





The high probability of a clinically significant, sudden or life-threatening 

deterioration of the [cardiac, respiratory] system(s) required my full and 

direct attention, intervention and personal management. The aggregate critical 

care time was [35] minutes without overlap. Time includes spent on;





[x] Data Review and interpretation 





[x] Patient assessment and monitoring of vital signs 





[x] Documentation 





[x] Medication orders and management











Subjective


Date of service: 09/07/18


Principal diagnosis: Acute on Chronic Hypoxemic Respiratory Failure; AE-CHF; 

CMOP; Hypotension


Interval history: 





Patient is seen today for: Acute on Chronic Hypoxemic Respiratory Failure; AE-

CHF; CMOP; Hypotension; Symptomatic Bradycardia





Seen and examined at bedside; 24hour events reviewed; nursing and respiratory 

care staff consulted; no adverse overnight events reported to me; remains on 

dopamine; remains on HFNC with FiO2 of 55%; No N/V/F/C; denies acute chest 

pains but still SOB





Objective


 Vital Signs - 12hr











  09/07/18 09/07/18 09/07/18





  02:00 02:15 02:30


 


Temperature   


 


Pulse Rate 101 H 98 H 94 H


 


Pulse Rate [   





Apical]   


 


Respiratory 22 19 19





Rate   


 


Blood Pressure 90/68 91/66 96/70


 


O2 Sat by Pulse   





Oximetry   














  09/07/18 09/07/18 09/07/18





  02:45 03:00 03:15


 


Temperature   


 


Pulse Rate 96 H 89 93 H


 


Pulse Rate [   





Apical]   


 


Respiratory 21 18 16





Rate   


 


Blood Pressure 95/72 99/68 101/74


 


O2 Sat by Pulse 98 99 99





Oximetry   














  09/07/18 09/07/18 09/07/18





  03:30 03:45 04:00


 


Temperature   98.4 F


 


Pulse Rate 96 H 96 H 93 H


 


Pulse Rate [   





Apical]   


 


Respiratory 19 23 22





Rate   


 


Blood Pressure 97/75 97/74 97/74


 


O2 Sat by Pulse 100 100 99





Oximetry   














  09/07/18 09/07/18 09/07/18





  04:15 04:30 04:45


 


Temperature   


 


Pulse Rate 90 99 H 97 H


 


Pulse Rate [   





Apical]   


 


Respiratory 20 23 23





Rate   


 


Blood Pressure 100/73 100/71 96/73


 


O2 Sat by Pulse 98 99 98





Oximetry   














  09/07/18 09/07/18 09/07/18





  05:00 05:15 05:30


 


Temperature   


 


Pulse Rate 94 H 92 H 93 H


 


Pulse Rate [   





Apical]   


 


Respiratory 19 20 20





Rate   


 


Blood Pressure 92/69 91/60 85/58


 


O2 Sat by Pulse 92 98 97





Oximetry   














  09/07/18 09/07/18 09/07/18





  05:45 06:00 06:15


 


Temperature   


 


Pulse Rate 89 102 H 98 H


 


Pulse Rate [   





Apical]   


 


Respiratory 18 18 20





Rate   


 


Blood Pressure 87/60 90/61 86/61


 


O2 Sat by Pulse 96 96 95





Oximetry   














  09/07/18 09/07/18 09/07/18





  06:30 06:45 07:00


 


Temperature   


 


Pulse Rate 94 H 91 H 88


 


Pulse Rate [   





Apical]   


 


Respiratory 21 19 20





Rate   


 


Blood Pressure 96/68 86/61 95/67


 


O2 Sat by Pulse 97 96 





Oximetry   














  09/07/18 09/07/18 09/07/18





  07:15 07:30 07:45


 


Temperature   


 


Pulse Rate 95 H 92 H 93 H


 


Pulse Rate [   





Apical]   


 


Respiratory 23 20 23





Rate   


 


Blood Pressure 96/69 90/66 90/66


 


O2 Sat by Pulse  97 98





Oximetry   














  09/07/18 09/07/18 09/07/18





  08:00 08:15 08:30


 


Temperature   


 


Pulse Rate 89 90 100 H


 


Pulse Rate [ 89  





Apical]   


 


Respiratory 22 22 18





Rate   


 


Blood Pressure 95/70 91/66 99/68


 


O2 Sat by Pulse 97 97 97





Oximetry   














  09/07/18 09/07/18 09/07/18





  08:45 09:00 09:15


 


Temperature  96.2 F L 


 


Pulse Rate 92 H 90 92 H


 


Pulse Rate [   





Apical]   


 


Respiratory 20 17 19





Rate   


 


Blood Pressure 89/66 93/69 99/68


 


O2 Sat by Pulse 94 95 96





Oximetry   














  09/07/18 09/07/18 09/07/18





  09:16 09:30 09:46


 


Temperature   


 


Pulse Rate  91 H 113 H


 


Pulse Rate [   





Apical]   


 


Respiratory  18 12





Rate   


 


Blood Pressure  96/69 93/71


 


O2 Sat by Pulse 96 96 100





Oximetry   














  09/07/18 09/07/18 09/07/18





  10:00 10:15 10:30


 


Temperature   


 


Pulse Rate 96 H 92 H 94 H


 


Pulse Rate [   





Apical]   


 


Respiratory 24 26 H 21





Rate   


 


Blood Pressure 89/66 109/91 100/76


 


O2 Sat by Pulse 97 97 96





Oximetry   














  09/07/18 09/07/18 09/07/18





  10:45 11:00 11:15


 


Temperature   


 


Pulse Rate 105 H 94 H 95 H


 


Pulse Rate [   





Apical]   


 


Respiratory 26 H 22 24





Rate   


 


Blood Pressure 93/72 93/72 97/72


 


O2 Sat by Pulse 95 97 





Oximetry   














  09/07/18 09/07/18 09/07/18





  11:23 11:30 11:45


 


Temperature   


 


Pulse Rate  97 H 97 H


 


Pulse Rate [   





Apical]   


 


Respiratory  14 20





Rate   


 


Blood Pressure  98/77 93/70


 


O2 Sat by Pulse 97  95





Oximetry   














  09/07/18 09/07/18 09/07/18





  12:00 12:15 12:30


 


Temperature 95.8 F L  


 


Pulse Rate 96 H 98 H 92 H


 


Pulse Rate [ 96 H  





Apical]   


 


Respiratory 21 14 18





Rate   


 


Blood Pressure 97/70 105/73 103/71


 


O2 Sat by Pulse 95 94 99





Oximetry   














  09/07/18 09/07/18 09/07/18





  12:45 13:00 13:15


 


Temperature   


 


Pulse Rate 100 H 95 H 101 H


 


Pulse Rate [   





Apical]   


 


Respiratory 19 19 19





Rate   


 


Blood Pressure 102/76 94/69 95/72


 


O2 Sat by Pulse  95 95





Oximetry   














  09/07/18 09/07/18





  13:30 13:45


 


Temperature  


 


Pulse Rate 96 H 95 H


 


Pulse Rate [  





Apical]  


 


Respiratory 13 19





Rate  


 


Blood Pressure 95/70 98/70


 


O2 Sat by Pulse  92





Oximetry  











Constitutional: alert, appears uncomfortable, other (elderly looking AAF, 

normocephalic and atraumatic with increased respiratory effort)


Eyes: non-icteric


ENT: oropharynx moist, other ([place] 3)


Neck: supple, no lymphadenopathy, other (No thyromegaly)


Effort: mildly labored


Ascultation: Bilateral: diminished breath sounds, rales (inspiratory in bases)


Percussion: Bilateral: not dull


Cardiovascular: irregular rhythm, other (No R/M)


Gastrointestinal: normoactive bowel sounds, soft, non-tender, non-distended, 

other (No HSM)


Integumentary: rash


Extremities: no cyanosis, pulses normal, no ischemia or petechiae, edema (2++)


Neurologic: normal mental status, non-focal exam, pupils equal and round, motor 

strength normal and


Psychiatric: mood appropriate, depressed (affect)


CBC and BMP: 


 09/08/18 04:20





 09/08/18 04:20


ABG, PT/INR, D-dimer: 


ABG











POC ABG pH  7.437  (7.35-7.45)   09/06/18  08:28    


 


POC ABG pCO2  37.1  (35-45)   09/06/18  08:28    


 


POC ABG pO2  65  ()  L  09/06/18  08:28    


 


POC ABG HCO3  25.0   09/06/18  08:28    


 


POC ABG Total CO2  26   09/06/18  08:28    


 


POC ABG O2 Sat  93   09/06/18  08:28    





PT/INR, D-dimer











PT  16.7 Sec. (12.2-14.9)  H  08/31/18  23:23    


 


INR  1.30  (0.87-1.13)  H  08/31/18  23:23    


 


D-Dimer  1149.50 ng/mlDDU (0-234)  H  08/31/18  23:23    








Abnormal lab findings: 


 Abnormal Labs











  08/31/18 08/31/18 08/31/18





  23:23 23:23 23:23


 


WBC   


 


Hgb   14.9 H 


 


Hct   46.0 H 


 


RDW   18.0 H 


 


Lymph % (Auto)   


 


Mono % (Auto)   


 


Lymph #   


 


Seg Neutrophils %   


 


Seg Neutrophils #   


 


PT    16.7 H


 


INR    1.30 H


 


D-Dimer    1149.50 H


 


POC ABG pO2   


 


Sodium  136 L  


 


Potassium   


 


Chloride  94.8 L  


 


Carbon Dioxide   


 


BUN  36 H  


 


Glucose  192 H  


 


POC Glucose   


 


Total Bilirubin   


 


Direct Bilirubin   


 


CK-MB (CK-2)   


 


CK-MB (CK-2) Rel Index   


 


NT-Pro-B Natriuret Pep  4255 H  


 


Albumin   


 


LDL Cholesterol Direct   














  08/31/18 09/01/18 09/02/18





  23:23 02:59 08:00


 


WBC   


 


Hgb   


 


Hct   


 


RDW   


 


Lymph % (Auto)   


 


Mono % (Auto)   


 


Lymph #   


 


Seg Neutrophils %   


 


Seg Neutrophils #   


 


PT   


 


INR   


 


D-Dimer   


 


POC ABG pO2   


 


Sodium    136 L


 


Potassium   


 


Chloride   


 


Carbon Dioxide   


 


BUN    34 H


 


Glucose    105 H


 


POC Glucose   186 H 


 


Total Bilirubin  1.80 H  


 


Direct Bilirubin  0.6 H  


 


CK-MB (CK-2)   


 


CK-MB (CK-2) Rel Index   


 


NT-Pro-B Natriuret Pep   


 


Albumin  3.6 L  


 


LDL Cholesterol Direct   














  09/03/18 09/03/18 09/04/18





  07:36 07:36 08:02


 


WBC  3.7 L   3.1 L


 


Hgb  14.6 H  


 


Hct  43.5 H  


 


RDW  17.3 H   17.7 H


 


Lymph % (Auto)  39.9 H   42.9 H


 


Mono % (Auto)  9.0 H   8.3 H


 


Lymph #   


 


Seg Neutrophils %   


 


Seg Neutrophils #  1.7 L   1.4 L


 


PT   


 


INR   


 


D-Dimer   


 


POC ABG pO2   


 


Sodium   135 L 


 


Potassium   


 


Chloride   


 


Carbon Dioxide   21 L 


 


BUN   33 H 


 


Glucose   


 


POC Glucose   


 


Total Bilirubin   


 


Direct Bilirubin   


 


CK-MB (CK-2)   


 


CK-MB (CK-2) Rel Index   


 


NT-Pro-B Natriuret Pep   


 


Albumin   


 


LDL Cholesterol Direct   














  09/04/18 09/04/18 09/05/18





  08:02 08:02 05:57


 


WBC   


 


Hgb   


 


Hct   


 


RDW   


 


Lymph % (Auto)   


 


Mono % (Auto)   


 


Lymph #   


 


Seg Neutrophils %   


 


Seg Neutrophils #   


 


PT   


 


INR   


 


D-Dimer   


 


POC ABG pO2   


 


Sodium    133 L


 


Potassium   


 


Chloride    97.4 L


 


Carbon Dioxide  21 L   17 L


 


BUN  32 H   30 H


 


Glucose    117 H


 


POC Glucose   


 


Total Bilirubin  1.30 H  


 


Direct Bilirubin   


 


CK-MB (CK-2)   


 


CK-MB (CK-2) Rel Index   


 


NT-Pro-B Natriuret Pep   


 


Albumin  3.0 L  


 


LDL Cholesterol Direct   42 L 














  09/05/18 09/05/18 09/05/18





  05:57 17:05 17:05


 


WBC  4.0 L  


 


Hgb   


 


Hct   


 


RDW  17.8 H  


 


Lymph % (Auto)   


 


Mono % (Auto)  7.4 H  


 


Lymph #  0.7 L  


 


Seg Neutrophils %  73.2 H  


 


Seg Neutrophils #   


 


PT   


 


INR   


 


D-Dimer   


 


POC ABG pO2   


 


Sodium   


 


Potassium   


 


Chloride   


 


Carbon Dioxide   


 


BUN   


 


Glucose   


 


POC Glucose   


 


Total Bilirubin   


 


Direct Bilirubin   


 


CK-MB (CK-2)    4.8 H


 


CK-MB (CK-2) Rel Index    8.1 H


 


NT-Pro-B Natriuret Pep   3883 H 


 


Albumin   


 


LDL Cholesterol Direct   














  09/06/18 09/06/18 09/06/18





  05:15 05:15 08:28


 


WBC   


 


Hgb   


 


Hct   


 


RDW  16.9 H  


 


Lymph % (Auto)   


 


Mono % (Auto)   


 


Lymph #  1.0 L  


 


Seg Neutrophils %  70.4 H  


 


Seg Neutrophils #   


 


PT   


 


INR   


 


D-Dimer   


 


POC ABG pO2    65 L


 


Sodium   


 


Potassium   3.4 L D 


 


Chloride   


 


Carbon Dioxide   


 


BUN   25 H 


 


Glucose   122 H 


 


POC Glucose   


 


Total Bilirubin   


 


Direct Bilirubin   


 


CK-MB (CK-2)   


 


CK-MB (CK-2) Rel Index   


 


NT-Pro-B Natriuret Pep   


 


Albumin   


 


LDL Cholesterol Direct   














  09/07/18 09/07/18





  04:37 04:37


 


WBC  


 


Hgb  


 


Hct  


 


RDW   17.4 H


 


Lymph % (Auto)  


 


Mono % (Auto)  


 


Lymph #   1.0 L


 


Seg Neutrophils %   70.7 H


 


Seg Neutrophils #  


 


PT  


 


INR  


 


D-Dimer  


 


POC ABG pO2  


 


Sodium  


 


Potassium  


 


Chloride  


 


Carbon Dioxide  


 


BUN  26 H 


 


Glucose  137 H 


 


POC Glucose  


 


Total Bilirubin  2.10 H 


 


Direct Bilirubin  1.0 H 


 


CK-MB (CK-2)  


 


CK-MB (CK-2) Rel Index  


 


NT-Pro-B Natriuret Pep  


 


Albumin  3.1 L 


 


LDL Cholesterol Direct  











Allied health notes reviewed: nursing

## 2018-09-07 NOTE — PROGRESS NOTE
Assessment and Plan


Assessment and plan: 


--Hypotension; blood pressures are still in the lower range, continue dopamine 

per cardiology


Beta blockers and ace inhibitors are on hold, Lasix as needed, cardiology 

following





--New onset A. fib with rapid ventricular rate; paroxysmal


Full dose anticoagulation with Lovenox, and transition to eliquis in stable





--Acute on chronic systolic congestive heart failure;


Ejection fraction 45-50%, oxygen titrated to O2 sats more than 90%


Patient is hypotensive ,hold IV diuretics, beta blockers, ace inhibitors doses, 


input output monitoring, Low-sodium diet.





--Acute on chronic respiratory failure; mild improvement


Secondary to acute exacerbation of congestive heart failure


On BiPAP, wean as  tolerated and change to nasal cannula oxygen





--History of Hypertension; she is currently hypotensive, hold all BP meds





--Mild malnutrition; supportive care and nutrition supplements





--DVT prophylaxis; Lovenox





--Full CODE STATUS





Physical therapy occupational therapy 


DC planning; possible home with home health and medically stable


Consider home hospice,


Plan of care is reviewed with the patient and the family member at the bedside





 critical care time 35 minutes











History


Interval history: 


Patient seen and examined medical records reviewed


Patient feels slightly better, on BiPAP


Denies any chest pain, mild shortness of breath


Concerned about leg edema


Patient is alert awake oriented


Vital Signs reviewed








Hospitalist Physical





- Constitutional


Vitals: 


 











Temp Pulse Resp BP Pulse Ox


 


 96.2 F L  113 H  12   93/71   100 


 


 09/07/18 09:00  09/07/18 09:46  09/07/18 09:46  09/07/18 09:46  09/07/18 09:46











General appearance: Present: no acute distress, well-nourished, other ( on BiPAP

)





- EENT


Eyes: Present: PERRL, EOM intact





- Neck


Neck: Present: supple, normal ROM





- Respiratory


Respiratory effort: normal


Respiratory: bilateral: diminished, rales, negative: rhonchi, wheezing





- Cardiovascular


Rhythm: regular


Heart Sounds: Present: S1 & S2





- Extremities


Extremities: no ischemia, No edema


Extremity abnormal: edema





- Abdominal


General gastrointestinal: soft, non-tender, non-distended, normal bowel sounds





- Integumentary


Integumentary: Present: clear, warm





- Psychiatric


Psychiatric: appropriate mood/affect, cooperative





- Neurologic


Neurologic: CNII-XII intact, moves all extremities





Results





- Labs


CBC & Chem 7: 


 09/07/18 04:37





 09/07/18 04:37


Labs: 


 Laboratory Last Values











WBC  4.9 K/mm3 (4.5-11.0)   09/07/18  04:37    


 


RBC  4.68 M/mm3 (3.65-5.03)   09/07/18  04:37    


 


Hgb  13.9 gm/dl (10.1-14.3)   09/07/18  04:37    


 


Hct  42.6 % (30.3-42.9)   09/07/18  04:37    


 


MCV  91 fl (79-97)   09/07/18  04:37    


 


MCH  30 pg (28-32)   09/07/18  04:37    


 


MCHC  33 % (30-34)   09/07/18  04:37    


 


RDW  17.4 % (13.2-15.2)  H  09/07/18  04:37    


 


Plt Count  231 K/mm3 (140-440)   09/07/18  04:37    


 


Lymph % (Auto)  20.7 % (13.4-35.0)   09/07/18  04:37    


 


Mono % (Auto)  6.8 % (0.0-7.3)   09/07/18  04:37    


 


Eos % (Auto)  0.7 % (0.0-4.3)   09/07/18  04:37    


 


Baso % (Auto)  Np   09/07/18  04:37    


 


Lymph #  1.0 K/mm3 (1.2-5.4)  L  09/07/18  04:37    


 


Mono #  0.3 K/mm3 (0.0-0.8)   09/07/18  04:37    


 


Eos #  0.0 K/mm3 (0.0-0.4)   09/07/18  04:37    


 


Baso #  0.1 K/mm3 (0.0-0.1)   09/07/18  04:37    


 


Seg Neutrophils %  70.7 % (40.0-70.0)  H  09/07/18  04:37    


 


Seg Neutrophils #  3.5 K/mm3 (1.8-7.7)   09/07/18  04:37    


 


PT  16.7 Sec. (12.2-14.9)  H  08/31/18  23:23    


 


INR  1.30  (0.87-1.13)  H  08/31/18  23:23    


 


APTT  29.2 Sec. (24.2-36.6)   08/31/18  23:23    


 


D-Dimer  1149.50 ng/mlDDU (0-234)  H  08/31/18  23:23    


 


POC ABG pH  7.437  (7.35-7.45)   09/06/18  08:28    


 


POC ABG pCO2  37.1  (35-45)   09/06/18  08:28    


 


POC ABG pO2  65  ()  L  09/06/18  08:28    


 


POC ABG HCO3  25.0   09/06/18  08:28    


 


POC ABG Total CO2  26   09/06/18  08:28    


 


POC ABG O2 Sat  93   09/06/18  08:28    


 


POC ABG Base Excess  1   09/06/18  08:28    


 


FiO2  75 %  09/06/18  08:28    


 


Sodium  139 mmol/L (137-145)   09/07/18  04:37    


 


Potassium  4.5 mmol/L (3.6-5.0)  D 09/07/18  04:37    


 


Chloride  99.4 mmol/L ()   09/07/18  04:37    


 


Carbon Dioxide  25 mmol/L (22-30)   09/07/18  04:37    


 


Anion Gap  19 mmol/L  09/07/18  04:37    


 


BUN  26 mg/dL (7-17)  H  09/07/18  04:37    


 


Creatinine  0.9 mg/dL (0.7-1.2)   09/07/18  04:37    


 


Estimated GFR  > 60 ml/min  09/07/18  04:37    


 


BUN/Creatinine Ratio  29 %  09/07/18  04:37    


 


Glucose  137 mg/dL ()  H  09/07/18  04:37    


 


POC Glucose  186  ()  H  09/01/18  02:59    


 


Calcium  9.1 mg/dL (8.4-10.2)   09/07/18  04:37    


 


Phosphorus  3.00 mg/dL (2.5-4.5)   09/05/18  05:57    


 


Magnesium  1.90 mg/dL (1.7-2.3)   09/07/18  04:37    


 


Total Bilirubin  2.10 mg/dL (0.1-1.2)  H  09/07/18  04:37    


 


Direct Bilirubin  1.0 mg/dL (0-0.2)  H  09/07/18  04:37    


 


Indirect Bilirubin  1.1 mg/dL  09/07/18  04:37    


 


AST  18 units/L (5-40)   09/07/18  04:37    


 


ALT  23 units/L (7-56)   09/07/18  04:37    


 


Alkaline Phosphatase  111 units/L ()   09/07/18  04:37    


 


Total Creatine Kinase  59 units/L ()   09/05/18  17:05    


 


CK-MB (CK-2)  4.8 ng/mL (0.0-4.0)  H  09/05/18  17:05    


 


CK-MB (CK-2) Rel Index  8.1  (0-4)  H  09/05/18  17:05    


 


Troponin T  < 0.010 ng/mL (0.00-0.029)   09/05/18  17:05    


 


NT-Pro-B Natriuret Pep  3883 pg/mL (0-900)  H  09/05/18  17:05    


 


Total Protein  6.8 g/dL (6.3-8.2)   09/07/18  04:37    


 


Albumin  3.1 g/dL (3.9-5)  L  09/07/18  04:37    


 


Albumin/Globulin Ratio  0.8 %  09/07/18  04:37    


 


Triglycerides  54 mg/dL (2-149)   09/04/18  08:02    


 


Cholesterol  95 mg/dL ()   09/04/18  08:02    


 


LDL Cholesterol Direct  42 mg/dL ()  L  09/04/18  08:02    


 


HDL Cholesterol  49 mg/dL (40-59)   09/04/18  08:02    


 


Cholesterol/HDL Ratio  1.93 %  09/04/18  08:02

## 2018-09-07 NOTE — PROGRESS NOTE
Assessment and Plan


Pt appears to be clinically improving. Cont dopamine gtt. Continue diuresis as 

tolerated. Obtain strict I&Os and daily weights. 


Wean BiPAP as tolerated. 


No BB and/or ACEI/ARB at this time in setting of low BPs. 


Cont full dosage lovenox BID for systemic anticoagulation in setting of 

recently noted paroxysmal atrial fibrillation. Consider conversion to OAC prior 

to hospital discharge. 





The patient has been seen in conjunction with Dr. MARBELLA Rashid who agrees with the 

assessment and plan of care. 





- Patient Problems


(1) Biventricular congestive heart failure


Current Visit: Yes   Status: Acute   





(2) Nonischemic cardiomyopathy


Current Visit: Yes   Status: Chronic   





(3) Acute respiratory failure


Current Visit: Yes   Status: Acute   





(4) Paroxysmal atrial fibrillation with RVR


Current Visit: Yes   Status: Acute   





(5) Pulmonary HTN


Current Visit: Yes   Status: Chronic   





(6) Tricuspid regurgitation


Current Visit: Yes   Status: Chronic   





(7) PFO (patent foramen ovale)


Current Visit: Yes   Status: Chronic   





(8) Hypotension


Current Visit: Yes   Status: Chronic   





(9) Hypoxia


Current Visit: Yes   Status: Chronic   





Subjective


Date of service: 09/07/18


Principal diagnosis: HF


Interval history: 


pt resting in bed, states she is feeling okay. remains on BiPAP on 50% O2. BLE 

edema improving. Dopamine gtt infusing. BPs improving. tele reviewed - pt was 

in SR overnight with no tachyarrhythmias noted. 





Objective





  Last Vital Signs











Temp  96.2 F L  09/07/18 09:00


 


Pulse  89   09/07/18 10:00


 


Resp  24   09/07/18 10:00


 


BP  89/66   09/07/18 10:00


 


Pulse Ox  97   09/07/18 10:00

















- Physical Examination


General: Other (on BiPAP)


HEENT: Positive: PERRL, Normocephaly, Mucus Membranes Moist


Neck: Positive: neck supple, trachea midline


Cardiac: Positive: Reg Rate and Rhythm, S1/S2


Lungs: Positive: Decreased Breath Sounds


Neuro: Positive: Grossly Intact


Abdomen: Positive: Soft.  Negative: Tender


Skin: Negative: Wound


Extremities: Present: +2 Edema (BLE)





- Labs and Meds


 Cardiac Enzymes











  09/07/18 Range/Units





  04:37 


 


AST  18  (5-40)  units/L








 CBC











  09/07/18 Range/Units





  04:37 


 


WBC  4.9  (4.5-11.0)  K/mm3


 


RBC  4.68  (3.65-5.03)  M/mm3


 


Hgb  13.9  (10.1-14.3)  gm/dl


 


Hct  42.6  (30.3-42.9)  %


 


Plt Count  231  (140-440)  K/mm3


 


Lymph #  1.0 L  (1.2-5.4)  K/mm3


 


Mono #  0.3  (0.0-0.8)  K/mm3


 


Eos #  0.0  (0.0-0.4)  K/mm3


 


Baso #  0.1  (0.0-0.1)  K/mm3








 Comprehensive Metabolic Panel











  09/07/18 Range/Units





  04:37 


 


Sodium  139  (137-145)  mmol/L


 


Potassium  4.5  D  (3.6-5.0)  mmol/L


 


Chloride  99.4  ()  mmol/L


 


Carbon Dioxide  25  (22-30)  mmol/L


 


BUN  26 H  (7-17)  mg/dL


 


Creatinine  0.9  (0.7-1.2)  mg/dL


 


Glucose  137 H  ()  mg/dL


 


Calcium  9.1  (8.4-10.2)  mg/dL


 


Direct Bilirubin  1.0 H  (0-0.2)  mg/dL


 


Indirect Bilirubin  1.1  mg/dL


 


AST  18  (5-40)  units/L


 


ALT  23  (7-56)  units/L


 


Alkaline Phosphatase  111  ()  units/L


 


Total Protein  6.8  (6.3-8.2)  g/dL


 


Albumin  3.1 L  (3.9-5)  g/dL














- Imaging and Cardiology


EKG: report reviewed, image reviewed


Echo: report reviewed (7/9/2018: VICENTE; EF 45-50%, marked RA enlargement, mod RV 

enlargement, mod RV systolic dysfunction, trace MR, mod to severe TR, mild pulm 

HTN with RVSP 38mmHg, mod left to right shunt suggestive of ostium secundum 

atrial septal defect)


Cardiac cath: report reviewed (7/10/2018: LHC & RHC, NO ASD, no O2 sat step-up, 

EF 35%, normal coronaries, mod pulmonary HTN. )





- Telemetry


EKG Rhythm: Sinus Rhythm

## 2018-09-07 NOTE — XRAY REPORT
Single view chest:



History: Pulmonary edema.



Findings:



Cardiomegaly. Trachea is midline. Tip of right PICC line in MID 

superior vena cava. Suspicion of mild pulmonary edema without 

significant interval change.



Impression:



No significant interval change.

## 2018-09-08 LAB
BASOPHILS # (AUTO): 0.1 K/MM3 (ref 0–0.1)
BASOPHILS NFR BLD AUTO: 2.7 % (ref 0–1.8)
BUN SERPL-MCNC: 29 MG/DL (ref 7–17)
BUN/CREAT SERPL: 32 %
CALCIUM SERPL-MCNC: 9.1 MG/DL (ref 8.4–10.2)
EOSINOPHIL # BLD AUTO: 0.1 K/MM3 (ref 0–0.4)
EOSINOPHIL NFR BLD AUTO: 1.9 % (ref 0–4.3)
HCT VFR BLD CALC: 44 % (ref 30.3–42.9)
HEMOLYSIS INDEX: 16
HGB BLD-MCNC: 14.2 GM/DL (ref 10.1–14.3)
LYMPHOCYTES # BLD AUTO: 1.2 K/MM3 (ref 1.2–5.4)
LYMPHOCYTES NFR BLD AUTO: 29.3 % (ref 13.4–35)
MCH RBC QN AUTO: 30 PG (ref 28–32)
MCHC RBC AUTO-ENTMCNC: 32 % (ref 30–34)
MCV RBC AUTO: 93 FL (ref 79–97)
MONOCYTES # (AUTO): 0.3 K/MM3 (ref 0–0.8)
MONOCYTES % (AUTO): 6.7 % (ref 0–7.3)
PLATELET # BLD: 232 K/MM3 (ref 140–440)
RBC # BLD AUTO: 4.73 M/MM3 (ref 3.65–5.03)

## 2018-09-08 PROCEDURE — 5A09357 ASSISTANCE WITH RESPIRATORY VENTILATION, LESS THAN 24 CONSECUTIVE HOURS, CONTINUOUS POSITIVE AIRWAY PRESSURE: ICD-10-PCS | Performed by: INTERNAL MEDICINE

## 2018-09-08 RX ADMIN — MIDODRINE HYDROCHLORIDE SCH MG: 5 TABLET ORAL at 10:59

## 2018-09-08 RX ADMIN — ENOXAPARIN SODIUM SCH MG: 100 INJECTION SUBCUTANEOUS at 21:20

## 2018-09-08 RX ADMIN — FUROSEMIDE SCH: 10 INJECTION, SOLUTION INTRAVENOUS at 17:34

## 2018-09-08 RX ADMIN — ENOXAPARIN SODIUM SCH MG: 100 INJECTION SUBCUTANEOUS at 10:59

## 2018-09-08 RX ADMIN — FAMOTIDINE SCH MG: 20 TABLET ORAL at 21:21

## 2018-09-08 RX ADMIN — ASPIRIN SCH MG: 81 TABLET, COATED ORAL at 10:59

## 2018-09-08 RX ADMIN — FAMOTIDINE SCH MG: 20 TABLET ORAL at 10:59

## 2018-09-08 RX ADMIN — MIDODRINE HYDROCHLORIDE SCH MG: 5 TABLET ORAL at 21:20

## 2018-09-08 RX ADMIN — ZOLPIDEM TARTRATE PRN MG: 5 TABLET ORAL at 21:20

## 2018-09-08 RX ADMIN — FUROSEMIDE SCH MG: 10 INJECTION, SOLUTION INTRAVENOUS at 06:48

## 2018-09-08 NOTE — PROGRESS NOTE
Assessment and Plan





clinically improving


wean pressors as tolerated


may hold evening lasix dosing


Given elevated/dilated rh pressures, very narrow fluid equilibrium is noted 

with preload dependence





- Patient Problems


(1) Abnormal EKG


Current Visit: Yes   Status: Acute   





(2) Bilateral lower extremity edema


Current Visit: Yes   Status: Acute   





(3) CHF exacerbation


Current Visit: Yes   Status: Acute   


Qualifiers: 


   Heart failure type: unspecified   Qualified Code(s): I50.9 - Heart failure, 

unspecified   





(4) Paroxysmal atrial fibrillation with RVR


Current Visit: Yes   Status: Acute   





(5) TIA (transient ischemic attack)


Current Visit: Yes   Status: Acute   





(6) Cor pulmonale


Current Visit: Yes   Status: Chronic   





(7) Nonischemic cardiomyopathy


Current Visit: Yes   Status: Chronic   





(8) Mild pulmonary hypertension


Current Visit: No   Status: Chronic   





Subjective


Date of service: 09/08/18


Principal diagnosis: Acute on Chronic Hypoxemic Respiratory Failure; AE-CHF; 

CMOP; Hypotension


Interval history: 





feels better





Objective


 Vital Signs











  Temp Pulse Pulse Resp BP Pulse Ox


 


 09/08/18 10:45   105 H   17  85/57  98


 


 09/08/18 10:30   105 H   18  91/62  95


 


 09/08/18 10:15   103 H   21  94/60  96


 


 09/08/18 10:00   105 H   25 H  89/66  98


 


 09/08/18 09:45   106 H   16  87/63  97


 


 09/08/18 09:30   106 H   23  87/63 


 


 09/08/18 09:15   112 H   12  89/60  97


 


 09/08/18 09:00   109 H   16  85/65  95


 


 09/08/18 08:45   101 H   22  85/65  97


 


 09/08/18 08:30   88   18  94/70  98


 


 09/08/18 08:15   90   21  95/69  97


 


 09/08/18 08:00  97.2 F L  90   19  93/70  97


 


 09/08/18 07:45   88   21  95/69  97


 


 09/08/18 07:30   90   21  95/68  98


 


 09/08/18 07:15   89   18  95/65  98


 


 09/08/18 07:00   95 H   17  90/67  98


 


 09/08/18 06:45   86   18  96/68  98


 


 09/08/18 06:30   87   20  95/72  99


 


 09/08/18 06:15   85   18  98/70  99


 


 09/08/18 06:00   89   21  97/70  99


 


 09/08/18 05:45   96 H   17  90/67  98


 


 09/08/18 05:30   86   19  88/65  98


 


 09/08/18 05:15   87   19  92/63  98


 


 09/08/18 05:00   82   19  94/70  97


 


 09/08/18 04:45   78   16  99/75  99


 


 09/08/18 04:30   102 H   22  83/65  100


 


 09/08/18 04:15   84   16  99/73 


 


 09/08/18 04:00  96 F L  82   17  94/69  98


 


 09/08/18 03:45   84   17  104/74 


 


 09/08/18 03:30   87   17  94/67 


 


 09/08/18 03:15   91 H   18  97/74 


 


 09/08/18 03:00   84   18  93/69 


 


 09/08/18 02:45   82   15  91/67 


 


 09/08/18 02:30   86   18  93/68 


 


 09/08/18 02:15   83   18  103/73 


 


 09/08/18 02:00   90   17  96/73 


 


 09/08/18 01:45   91 H   19  106/78  96


 


 09/08/18 01:30   78   16  109/80 


 


 09/08/18 01:15   79   17  111/78 


 


 09/08/18 01:00   90   15  75/52  95


 


 09/08/18 00:13  96 F L     


 


 09/08/18 00:02   87   16  97/76  95


 


 09/08/18 00:01   88    


 


 09/08/18 00:00  96 F L  85   16  97/76 


 


 09/07/18 23:33   93 H   19  102/74  96


 


 09/07/18 23:00   85   21  110/80  94


 


 09/07/18 22:00   99 H   18  87/64  94


 


 09/07/18 21:00   97 H   19  92/65  94


 


 09/07/18 20:00   102 H   17  91/65  92


 


 09/07/18 19:15   99 H   18  91/65  91


 


 09/07/18 19:00   108 H   19  95/68 


 


 09/07/18 18:46   109 H   20  95/68  87


 


 09/07/18 18:30   99 H   20  95/68  88


 


 09/07/18 18:15   98 H   19  87/69  92


 


 09/07/18 18:00   105 H   21  88/68  100


 


 09/07/18 17:45   100 H   19  92/69 


 


 09/07/18 17:30   98 H   20  87/61 


 


 09/07/18 17:15   103 H   11 L  82/61  87


 


 09/07/18 17:00   103 H   13  86/64 


 


 09/07/18 16:45   101 H   21  92/63  94


 


 09/07/18 16:30   105 H   11 L  97/70 


 


 09/07/18 16:15   102 H   17  94/62  92


 


 09/07/18 16:00  97.6 F  91 H  91 H  17  98/72  92


 


 09/07/18 15:45   95 H   18  88/65 


 


 09/07/18 15:30   92 H   16  92/67  92


 


 09/07/18 15:15   95 H   16  96/69  93


 


 09/07/18 15:00   92 H   17  98/72 


 


 09/07/18 14:45   96 H   17  91/67 


 


 09/07/18 14:30   91 H   15  101/67  91


 


 09/07/18 14:15   101 H   18  102/77  94


 


 09/07/18 14:00   94 H   16  92/70 


 


 09/07/18 13:45   95 H   19  98/70  92


 


 09/07/18 13:30   96 H   13  95/70 


 


 09/07/18 13:15   101 H   19  95/72  95


 


 09/07/18 13:00   95 H   19  94/69  95


 


 09/07/18 12:45   100 H   19  102/76 


 


 09/07/18 12:30   92 H   18  103/71  99


 


 09/07/18 12:15   98 H   14  105/73  94


 


 09/07/18 12:00  95.8 F L  96 H  96 H  21  97/70  95


 


 09/07/18 11:45   97 H   20  93/70  95


 


 09/07/18 11:30   97 H   14  98/77 


 


 09/07/18 11:23       97


 


 09/07/18 11:15   95 H   24  97/72 














- Physical Examination


General: Other (on BiPAP)


HEENT: Positive: PERRL, Normocephaly, Mucus Membranes Moist


Neck: Positive: neck supple, trachea midline


Neuro: Positive: Grossly Intact


Abdomen: Positive: Soft.  Negative: Tender


Skin: Negative: Wound


Extremities: Present: +2 Edema (BLE)





- Labs and Meds


 CBC











  09/08/18 Range/Units





  04:20 


 


WBC  4.1 L  (4.5-11.0)  K/mm3


 


RBC  4.73  (3.65-5.03)  M/mm3


 


Hgb  14.2  (10.1-14.3)  gm/dl


 


Hct  44.0 H  (30.3-42.9)  %


 


Plt Count  232  (140-440)  K/mm3


 


Lymph #  1.2  (1.2-5.4)  K/mm3


 


Mono #  0.3  (0.0-0.8)  K/mm3


 


Eos #  0.1  (0.0-0.4)  K/mm3


 


Baso #  0.1  (0.0-0.1)  K/mm3








 Comprehensive Metabolic Panel











  09/08/18 Range/Units





  04:20 


 


Sodium  136 L  (137-145)  mmol/L


 


Potassium  4.1  (3.6-5.0)  mmol/L


 


Chloride  97.8 L  ()  mmol/L


 


Carbon Dioxide  25  (22-30)  mmol/L


 


BUN  29 H  (7-17)  mg/dL


 


Creatinine  0.9  (0.7-1.2)  mg/dL


 


Glucose  113 H  ()  mg/dL


 


Calcium  9.1  (8.4-10.2)  mg/dL














- Imaging and Cardiology


EKG: report reviewed, image reviewed


Echo: report reviewed (7/9/2018: VICENTE; EF 45-50%, marked RA enlargement, mod RV 

enlargement, mod RV systolic dysfunction, trace MR, mod to severe TR, mild pulm 

HTN with RVSP 38mmHg, mod left to right shunt suggestive of ostium secundum 

atrial septal defect)


Cardiac cath: report reviewed (7/10/2018: LHC & RHC, NO ASD, no O2 sat step-up, 

EF 35%, normal coronaries, mod pulmonary HTN. )





- Allied health notes


Allied health notes reviewed: nursing

## 2018-09-08 NOTE — PROGRESS NOTE
Assessment and Plan


Assessment and plan: 


--Hypotension; patient is on midodrine and dopamine ,blood pressures are still 

in the lower range, 


Beta blockers and ace inhibitors are on hold, Lasix as needed, cardiology 

following





--Acute on chronic respiratory failure; and due to acute on chronic CHF 


on intermittent BiPAP Patient is saturating well on nasal cannula oxygen this 

morning


Titrated to O2 sats more than 90%, supportive care, evaluate for home oxygen at 

discharge





--New onset A. fib with rapid ventricular rate; paroxysmal


Now rate controlled,anticoagulation with Lovenox,transition to eliquis when pt 

is stable





--Acute on chronic systolic congestive heart failure; with severe lower 

extremity edema


Ejection fraction 45-50%, oxygen , elevated the limbs, low-sodium diet, fluid 

restriction


beta blockers, ace inhibitors are on hold in view of hypotension, 


Lasix as tolerated ,input output monitoring, 





--Mild malnutrition; supportive care and nutrition supplements





--DVT prophylaxis; Lovenox





--Full CODE STATUS





Physical therapy occupational therapy as tolerated 


Closely monitor and adjust the management as needed





critical care time 35 minutes














History


Interval history: 


Patient Seen and examined medical records reviewed


Patient feels slightly better, denies chest pain or shortness of breath


Remains hypotensive on dopamine drip


Alert awake oriented 3


Vital signs reviewed








Hospitalist Physical





- Constitutional


Vitals: 


 











Temp Pulse Resp BP Pulse Ox


 


 96 F L  85   18   98/70   99 


 


 09/08/18 04:00  09/08/18 06:15  09/08/18 06:15  09/08/18 06:15  09/08/18 06:15











General appearance: Present: no acute distress, well-nourished, other (nasal 

cannula oxygen)





- EENT


Eyes: Present: PERRL, EOM intact





- Neck


Neck: Present: supple, normal ROM





- Respiratory


Respiratory effort: normal


Respiratory: bilateral: diminished, rales, negative: rhonchi, wheezing





- Cardiovascular


Rhythm: regular


Heart Sounds: Present: S1 & S2





- Extremities


Extremities: no ischemia


Extremity abnormal: edema (bilateral pedal edema 2+)





- Abdominal


General gastrointestinal: soft, non-tender, non-distended, normal bowel sounds





- Integumentary


Integumentary: Present: clear, warm





- Psychiatric


Psychiatric: appropriate mood/affect, cooperative





- Neurologic


Neurologic: CNII-XII intact, moves all extremities





Results





- Labs


CBC & Chem 7: 


 09/08/18 04:20





 09/08/18 04:20


Labs: 


 Laboratory Last Values











WBC  4.1 K/mm3 (4.5-11.0)  L  09/08/18  04:20    


 


RBC  4.73 M/mm3 (3.65-5.03)   09/08/18  04:20    


 


Hgb  14.2 gm/dl (10.1-14.3)   09/08/18  04:20    


 


Hct  44.0 % (30.3-42.9)  H  09/08/18  04:20    


 


MCV  93 fl (79-97)   09/08/18  04:20    


 


MCH  30 pg (28-32)   09/08/18  04:20    


 


MCHC  32 % (30-34)   09/08/18  04:20    


 


RDW  18.1 % (13.2-15.2)  H  09/08/18  04:20    


 


Plt Count  232 K/mm3 (140-440)   09/08/18  04:20    


 


Lymph % (Auto)  29.3 % (13.4-35.0)   09/08/18  04:20    


 


Mono % (Auto)  6.7 % (0.0-7.3)   09/08/18  04:20    


 


Eos % (Auto)  1.9 % (0.0-4.3)   09/08/18  04:20    


 


Baso % (Auto)  2.7 % (0.0-1.8)  H  09/08/18  04:20    


 


Lymph #  1.2 K/mm3 (1.2-5.4)   09/08/18  04:20    


 


Mono #  0.3 K/mm3 (0.0-0.8)   09/08/18  04:20    


 


Eos #  0.1 K/mm3 (0.0-0.4)   09/08/18  04:20    


 


Baso #  0.1 K/mm3 (0.0-0.1)   09/08/18  04:20    


 


Seg Neutrophils %  59.4 % (40.0-70.0)   09/08/18  04:20    


 


Seg Neutrophils #  2.5 K/mm3 (1.8-7.7)   09/08/18  04:20    


 


PT  16.7 Sec. (12.2-14.9)  H  08/31/18  23:23    


 


INR  1.30  (0.87-1.13)  H  08/31/18  23:23    


 


APTT  29.2 Sec. (24.2-36.6)   08/31/18  23:23    


 


D-Dimer  1149.50 ng/mlDDU (0-234)  H  08/31/18  23:23    


 


POC ABG pH  7.394  (7.35-7.45)   09/07/18  14:13    


 


POC ABG pCO2  41.4  (35-45)   09/07/18  14:13    


 


POC ABG pO2  62  ()  L  09/07/18  14:13    


 


POC ABG HCO3  25.3   09/07/18  14:13    


 


POC ABG Total CO2  27   09/07/18  14:13    


 


POC ABG O2 Sat  91   09/07/18  14:13    


 


POC ABG Base Excess  0   09/07/18  14:13    


 


FiO2  50 %  09/07/18  14:13    


 


Sodium  136 mmol/L (137-145)  L  09/08/18  04:20    


 


Potassium  4.1 mmol/L (3.6-5.0)   09/08/18  04:20    


 


Chloride  97.8 mmol/L ()  L  09/08/18  04:20    


 


Carbon Dioxide  25 mmol/L (22-30)   09/08/18  04:20    


 


Anion Gap  17 mmol/L  09/08/18  04:20    


 


BUN  29 mg/dL (7-17)  H  09/08/18  04:20    


 


Creatinine  0.9 mg/dL (0.7-1.2)   09/08/18  04:20    


 


Estimated GFR  > 60 ml/min  09/08/18  04:20    


 


BUN/Creatinine Ratio  32 %  09/08/18  04:20    


 


Glucose  113 mg/dL ()  H  09/08/18  04:20    


 


POC Glucose  186  ()  H  09/01/18  02:59    


 


Calcium  9.1 mg/dL (8.4-10.2)   09/08/18  04:20    


 


Phosphorus  3.00 mg/dL (2.5-4.5)   09/05/18  05:57    


 


Magnesium  1.90 mg/dL (1.7-2.3)   09/08/18  04:20    


 


Total Bilirubin  2.10 mg/dL (0.1-1.2)  H  09/07/18  04:37    


 


Direct Bilirubin  1.0 mg/dL (0-0.2)  H  09/07/18  04:37    


 


Indirect Bilirubin  1.1 mg/dL  09/07/18  04:37    


 


AST  18 units/L (5-40)   09/07/18  04:37    


 


ALT  23 units/L (7-56)   09/07/18  04:37    


 


Alkaline Phosphatase  111 units/L ()   09/07/18  04:37    


 


Total Creatine Kinase  59 units/L ()   09/05/18  17:05    


 


CK-MB (CK-2)  4.8 ng/mL (0.0-4.0)  H  09/05/18  17:05    


 


CK-MB (CK-2) Rel Index  8.1  (0-4)  H  09/05/18  17:05    


 


Troponin T  < 0.010 ng/mL (0.00-0.029)   09/05/18  17:05    


 


NT-Pro-B Natriuret Pep  3883 pg/mL (0-900)  H  09/05/18  17:05    


 


Total Protein  6.8 g/dL (6.3-8.2)   09/07/18  04:37    


 


Albumin  3.1 g/dL (3.9-5)  L  09/07/18  04:37    


 


Albumin/Globulin Ratio  0.8 %  09/07/18  04:37    


 


Triglycerides  54 mg/dL (2-149)   09/04/18  08:02    


 


Cholesterol  95 mg/dL ()   09/04/18  08:02    


 


LDL Cholesterol Direct  42 mg/dL ()  L  09/04/18  08:02    


 


HDL Cholesterol  49 mg/dL (40-59)   09/04/18  08:02    


 


Cholesterol/HDL Ratio  1.93 %  09/04/18  08:02

## 2018-09-08 NOTE — PROGRESS NOTE
Assessment and Plan








Syncope


Acute on chronic respiratory failure (2nd to CHF, fluid overload)


Orthopnea


Chronic Biventricular CHF with acute exacerbation


NICMOP


Paroxysmal Atrial Fibrillation


H/O Pulmonary HTN


PFO


Hypotension (h/o Hypertension)


Hypokalemia (likely due to diuretic)


Moderate protein calorie malnutrition





- continue full anticoagulation for A-fib


- continue supplemental oxygen to keep sats > 90% (HFNC)


- continue BIPAP at  increased settings of 14/10 (Scheduled qhs)


- continue bronchodilators with pulmonary hygiene per RT


- follow  am CXR


- continue gentle diuresis


- continue dopamine per cardiology recommendations


- continue aspiration precautions


- increase Pepcid to bid re: reflux symptoms


- PT/OT as tolerated


- mobility protocol for pressure ulcer prophylaxis


- target MAP > 65mmHg


- continue other care per attending / other consultants





The high probability of a clinically significant, sudden or life-threatening 

deterioration of the [cardiac, respiratory] system(s) required my full and 

direct attention, intervention and personal management. The aggregate critical 

care time was [38] minutes without overlap. Time includes spent on;





[x] Data Review and interpretation 





[x] Patient assessment and monitoring of vital signs 





[x] Documentation 





[x] Medication orders and management











Subjective


Date of service: 09/08/18


Principal diagnosis: Acute on Chronic Hypoxemic Respiratory Failure; AE-CHF; 

CMOP; Hypotension


Interval history: 





Patient is seen today for: Acute on Chronic Hypoxemic Respiratory Failure; AE-

CHF; CMOP; Hypotension; Symptomatic Bradycardia





Seen and examined at bedside; 24hour events reviewed; nursing and respiratory 

care staff consulted; no adverse overnight events reported to me; remains on 

dopamine; remains on supplemental oxygen (HFNC at 55%); no new issues otherwise





Objective


 Vital Signs - 12hr











  09/08/18 09/08/18 09/08/18





  04:45 05:00 05:15


 


Temperature   


 


Pulse Rate 78 82 87


 


Pulse Rate [   





Left Dorsalis   





Pedis]   


 


Respiratory 16 19 19





Rate   


 


Blood Pressure 99/75 94/70 92/63


 


O2 Sat by Pulse 99 97 98





Oximetry   














  09/08/18 09/08/18 09/08/18





  05:30 05:45 06:00


 


Temperature   


 


Pulse Rate 86 96 H 89


 


Pulse Rate [   





Left Dorsalis   





Pedis]   


 


Respiratory 19 17 21





Rate   


 


Blood Pressure 88/65 90/67 97/70


 


O2 Sat by Pulse 98 98 99





Oximetry   














  09/08/18 09/08/18 09/08/18





  06:15 06:30 06:45


 


Temperature   


 


Pulse Rate 85 87 86


 


Pulse Rate [   





Left Dorsalis   





Pedis]   


 


Respiratory 18 20 18





Rate   


 


Blood Pressure 98/70 95/72 96/68


 


O2 Sat by Pulse 99 99 98





Oximetry   














  09/08/18 09/08/18 09/08/18





  07:00 07:15 07:30


 


Temperature   


 


Pulse Rate 95 H 89 90


 


Pulse Rate [   





Left Dorsalis   





Pedis]   


 


Respiratory 17 18 21





Rate   


 


Blood Pressure 90/67 95/65 95/68


 


O2 Sat by Pulse 98 98 98





Oximetry   














  09/08/18 09/08/18 09/08/18





  07:45 08:00 08:15


 


Temperature  97.2 F L 


 


Pulse Rate 88 90 90


 


Pulse Rate [  112 H 





Left Dorsalis   





Pedis]   


 


Respiratory 21 19 21





Rate   


 


Blood Pressure 95/69 93/70 95/69


 


O2 Sat by Pulse 97 97 97





Oximetry   














  09/08/18 09/08/18 09/08/18





  08:30 08:45 09:00


 


Temperature   


 


Pulse Rate 88 101 H 109 H


 


Pulse Rate [   





Left Dorsalis   





Pedis]   


 


Respiratory 18 22 16





Rate   


 


Blood Pressure 94/70 85/65 85/65


 


O2 Sat by Pulse 98 97 95





Oximetry   














  09/08/18 09/08/18 09/08/18





  09:15 09:30 09:45


 


Temperature   


 


Pulse Rate 112 H 106 H 106 H


 


Pulse Rate [   





Left Dorsalis   





Pedis]   


 


Respiratory 12 23 16





Rate   


 


Blood Pressure 89/60 87/63 87/63


 


O2 Sat by Pulse 97  97





Oximetry   














  09/08/18 09/08/18 09/08/18





  10:00 10:15 10:30


 


Temperature   


 


Pulse Rate 105 H 103 H 105 H


 


Pulse Rate [   





Left Dorsalis   





Pedis]   


 


Respiratory 25 H 21 18





Rate   


 


Blood Pressure 89/66 94/60 91/62


 


O2 Sat by Pulse 98 96 95





Oximetry   














  09/08/18 09/08/18 09/08/18





  10:40 10:45 11:01


 


Temperature   


 


Pulse Rate  105 H 110 H


 


Pulse Rate [   





Left Dorsalis   





Pedis]   


 


Respiratory  17 22





Rate   


 


Blood Pressure  85/57 87/65


 


O2 Sat by Pulse 97 98 96





Oximetry   














  09/08/18 09/08/18 09/08/18





  11:15 11:30 11:45


 


Temperature   


 


Pulse Rate 105 H 105 H 95 H


 


Pulse Rate [   





Left Dorsalis   





Pedis]   


 


Respiratory 20 25 H 17





Rate   


 


Blood Pressure 82/59 87/67 98/69


 


O2 Sat by Pulse 96 92 94





Oximetry   














  09/08/18 09/08/18 09/08/18





  12:00 12:15 12:31


 


Temperature 97.2 F L  


 


Pulse Rate 97 H 106 H 96 H


 


Pulse Rate [ 107 H  





Left Dorsalis   





Pedis]   


 


Respiratory 19 23 25 H





Rate   


 


Blood Pressure 104/70 87/65 104/86


 


O2 Sat by Pulse 94  





Oximetry   














  09/08/18 09/08/18 09/08/18





  12:45 13:00 13:15


 


Temperature   


 


Pulse Rate 94 H 102 H 94 H


 


Pulse Rate [   





Left Dorsalis   





Pedis]   


 


Respiratory 17 16 17





Rate   


 


Blood Pressure 101/75 88/62 95/69


 


O2 Sat by Pulse 98 93 94





Oximetry   














  09/08/18 09/08/18 09/08/18





  13:30 13:45 14:00


 


Temperature   


 


Pulse Rate 98 H 109 H 108 H


 


Pulse Rate [   





Left Dorsalis   





Pedis]   


 


Respiratory 17 13 16





Rate   


 


Blood Pressure 91/69 97/62 81/58


 


O2 Sat by Pulse 96 96 





Oximetry   














  09/08/18 09/08/18 09/08/18





  14:15 14:30 14:45


 


Temperature   


 


Pulse Rate 106 H 103 H 103 H


 


Pulse Rate [   





Left Dorsalis   





Pedis]   


 


Respiratory 17 12 18





Rate   


 


Blood Pressure 87/61 84/58 85/66


 


O2 Sat by Pulse 95 94 93





Oximetry   














  09/08/18 09/08/18 09/08/18





  15:01 15:15 15:30


 


Temperature   


 


Pulse Rate 102 H 107 H 102 H


 


Pulse Rate [   





Left Dorsalis   





Pedis]   


 


Respiratory 21 19 17





Rate   


 


Blood Pressure 94/61 94/71 89/65


 


O2 Sat by Pulse 95 93 99





Oximetry   














  09/08/18 09/08/18





  15:45 16:00


 


Temperature  97.2 F L


 


Pulse Rate 109 H 104 H


 


Pulse Rate [  104 H





Left Dorsalis  





Pedis]  


 


Respiratory 22 19





Rate  


 


Blood Pressure 93/66 93/66


 


O2 Sat by Pulse 100 95





Oximetry  











Constitutional: alert, appears uncomfortable, other (elderly looking AAF, 

normocephalic and atraumatic with increased respiratory effort)


Eyes: non-icteric


ENT: oropharynx moist, other ([place] 3)


Neck: supple, no lymphadenopathy, other (No thyromegaly)


Effort: mildly labored


Ascultation: Bilateral: diminished breath sounds, rales (inspiratory in bases)


Percussion: Bilateral: not dull


Cardiovascular: irregular rhythm, other (No R/M)


Gastrointestinal: normoactive bowel sounds, soft, non-tender, non-distended, 

other (No HSM)


Integumentary: rash


Extremities: no cyanosis, pulses normal, no ischemia or petechiae, edema (2++)


Neurologic: normal mental status, non-focal exam, pupils equal and round, motor 

strength normal and


Psychiatric: mood appropriate, depressed (affect)


CBC and BMP: 


 09/08/18 04:20





 09/09/18 05:15


ABG, PT/INR, D-dimer: 


ABG











POC ABG pH  7.394  (7.35-7.45)   09/07/18  14:13    


 


POC ABG pCO2  41.4  (35-45)   09/07/18  14:13    


 


POC ABG pO2  62  ()  L  09/07/18  14:13    


 


POC ABG HCO3  25.3   09/07/18  14:13    


 


POC ABG Total CO2  27   09/07/18  14:13    


 


POC ABG O2 Sat  91   09/07/18  14:13    





PT/INR, D-dimer











PT  16.7 Sec. (12.2-14.9)  H  08/31/18  23:23    


 


INR  1.30  (0.87-1.13)  H  08/31/18  23:23    


 


D-Dimer  1149.50 ng/mlDDU (0-234)  H  08/31/18  23:23    








Abnormal lab findings: 


 Abnormal Labs











  08/31/18 08/31/18 08/31/18





  23:23 23:23 23:23


 


WBC   


 


Hgb   14.9 H 


 


Hct   46.0 H 


 


RDW   18.0 H 


 


Lymph % (Auto)   


 


Mono % (Auto)   


 


Baso % (Auto)   


 


Lymph #   


 


Seg Neutrophils %   


 


Seg Neutrophils #   


 


PT    16.7 H


 


INR    1.30 H


 


D-Dimer    1149.50 H


 


POC ABG pO2   


 


Sodium  136 L  


 


Potassium   


 


Chloride  94.8 L  


 


Carbon Dioxide   


 


BUN  36 H  


 


Glucose  192 H  


 


POC Glucose   


 


Total Bilirubin   


 


Direct Bilirubin   


 


CK-MB (CK-2)   


 


CK-MB (CK-2) Rel Index   


 


NT-Pro-B Natriuret Pep  4255 H  


 


Albumin   


 


LDL Cholesterol Direct   














  08/31/18 09/01/18 09/02/18





  23:23 02:59 08:00


 


WBC   


 


Hgb   


 


Hct   


 


RDW   


 


Lymph % (Auto)   


 


Mono % (Auto)   


 


Baso % (Auto)   


 


Lymph #   


 


Seg Neutrophils %   


 


Seg Neutrophils #   


 


PT   


 


INR   


 


D-Dimer   


 


POC ABG pO2   


 


Sodium    136 L


 


Potassium   


 


Chloride   


 


Carbon Dioxide   


 


BUN    34 H


 


Glucose    105 H


 


POC Glucose   186 H 


 


Total Bilirubin  1.80 H  


 


Direct Bilirubin  0.6 H  


 


CK-MB (CK-2)   


 


CK-MB (CK-2) Rel Index   


 


NT-Pro-B Natriuret Pep   


 


Albumin  3.6 L  


 


LDL Cholesterol Direct   














  09/03/18 09/03/18 09/04/18





  07:36 07:36 08:02


 


WBC  3.7 L   3.1 L


 


Hgb  14.6 H  


 


Hct  43.5 H  


 


RDW  17.3 H   17.7 H


 


Lymph % (Auto)  39.9 H   42.9 H


 


Mono % (Auto)  9.0 H   8.3 H


 


Baso % (Auto)   


 


Lymph #   


 


Seg Neutrophils %   


 


Seg Neutrophils #  1.7 L   1.4 L


 


PT   


 


INR   


 


D-Dimer   


 


POC ABG pO2   


 


Sodium   135 L 


 


Potassium   


 


Chloride   


 


Carbon Dioxide   21 L 


 


BUN   33 H 


 


Glucose   


 


POC Glucose   


 


Total Bilirubin   


 


Direct Bilirubin   


 


CK-MB (CK-2)   


 


CK-MB (CK-2) Rel Index   


 


NT-Pro-B Natriuret Pep   


 


Albumin   


 


LDL Cholesterol Direct   














  09/04/18 09/04/18 09/05/18





  08:02 08:02 05:57


 


WBC   


 


Hgb   


 


Hct   


 


RDW   


 


Lymph % (Auto)   


 


Mono % (Auto)   


 


Baso % (Auto)   


 


Lymph #   


 


Seg Neutrophils %   


 


Seg Neutrophils #   


 


PT   


 


INR   


 


D-Dimer   


 


POC ABG pO2   


 


Sodium    133 L


 


Potassium   


 


Chloride    97.4 L


 


Carbon Dioxide  21 L   17 L


 


BUN  32 H   30 H


 


Glucose    117 H


 


POC Glucose   


 


Total Bilirubin  1.30 H  


 


Direct Bilirubin   


 


CK-MB (CK-2)   


 


CK-MB (CK-2) Rel Index   


 


NT-Pro-B Natriuret Pep   


 


Albumin  3.0 L  


 


LDL Cholesterol Direct   42 L 














  09/05/18 09/05/18 09/05/18





  05:57 17:05 17:05


 


WBC  4.0 L  


 


Hgb   


 


Hct   


 


RDW  17.8 H  


 


Lymph % (Auto)   


 


Mono % (Auto)  7.4 H  


 


Baso % (Auto)   


 


Lymph #  0.7 L  


 


Seg Neutrophils %  73.2 H  


 


Seg Neutrophils #   


 


PT   


 


INR   


 


D-Dimer   


 


POC ABG pO2   


 


Sodium   


 


Potassium   


 


Chloride   


 


Carbon Dioxide   


 


BUN   


 


Glucose   


 


POC Glucose   


 


Total Bilirubin   


 


Direct Bilirubin   


 


CK-MB (CK-2)    4.8 H


 


CK-MB (CK-2) Rel Index    8.1 H


 


NT-Pro-B Natriuret Pep   3883 H 


 


Albumin   


 


LDL Cholesterol Direct   














  09/06/18 09/06/18 09/06/18





  05:15 05:15 08:28


 


WBC   


 


Hgb   


 


Hct   


 


RDW  16.9 H  


 


Lymph % (Auto)   


 


Mono % (Auto)   


 


Baso % (Auto)   


 


Lymph #  1.0 L  


 


Seg Neutrophils %  70.4 H  


 


Seg Neutrophils #   


 


PT   


 


INR   


 


D-Dimer   


 


POC ABG pO2    65 L


 


Sodium   


 


Potassium   3.4 L D 


 


Chloride   


 


Carbon Dioxide   


 


BUN   25 H 


 


Glucose   122 H 


 


POC Glucose   


 


Total Bilirubin   


 


Direct Bilirubin   


 


CK-MB (CK-2)   


 


CK-MB (CK-2) Rel Index   


 


NT-Pro-B Natriuret Pep   


 


Albumin   


 


LDL Cholesterol Direct   














  09/07/18 09/07/18 09/07/18





  04:37 04:37 14:13


 


WBC   


 


Hgb   


 


Hct   


 


RDW   17.4 H 


 


Lymph % (Auto)   


 


Mono % (Auto)   


 


Baso % (Auto)   


 


Lymph #   1.0 L 


 


Seg Neutrophils %   70.7 H 


 


Seg Neutrophils #   


 


PT   


 


INR   


 


D-Dimer   


 


POC ABG pO2    62 L


 


Sodium   


 


Potassium   


 


Chloride   


 


Carbon Dioxide   


 


BUN  26 H  


 


Glucose  137 H  


 


POC Glucose   


 


Total Bilirubin  2.10 H  


 


Direct Bilirubin  1.0 H  


 


CK-MB (CK-2)   


 


CK-MB (CK-2) Rel Index   


 


NT-Pro-B Natriuret Pep   


 


Albumin  3.1 L  


 


LDL Cholesterol Direct   














  09/08/18 09/08/18





  04:20 04:20


 


WBC  4.1 L 


 


Hgb  


 


Hct  44.0 H 


 


RDW  18.1 H 


 


Lymph % (Auto)  


 


Mono % (Auto)  


 


Baso % (Auto)  2.7 H 


 


Lymph #  


 


Seg Neutrophils %  


 


Seg Neutrophils #  


 


PT  


 


INR  


 


D-Dimer  


 


POC ABG pO2  


 


Sodium   136 L


 


Potassium  


 


Chloride   97.8 L


 


Carbon Dioxide  


 


BUN   29 H


 


Glucose   113 H


 


POC Glucose  


 


Total Bilirubin  


 


Direct Bilirubin  


 


CK-MB (CK-2)  


 


CK-MB (CK-2) Rel Index  


 


NT-Pro-B Natriuret Pep  


 


Albumin  


 


LDL Cholesterol Direct  











Allied health notes reviewed: nursing

## 2018-09-09 LAB
BUN SERPL-MCNC: 31 MG/DL (ref 7–17)
BUN/CREAT SERPL: 34 %
CALCIUM SERPL-MCNC: 9 MG/DL (ref 8.4–10.2)
HEMOLYSIS INDEX: 3

## 2018-09-09 PROCEDURE — 5A09357 ASSISTANCE WITH RESPIRATORY VENTILATION, LESS THAN 24 CONSECUTIVE HOURS, CONTINUOUS POSITIVE AIRWAY PRESSURE: ICD-10-PCS | Performed by: INTERNAL MEDICINE

## 2018-09-09 RX ADMIN — FAMOTIDINE SCH MG: 20 TABLET ORAL at 21:47

## 2018-09-09 RX ADMIN — ONDANSETRON PRN MG: 2 INJECTION INTRAMUSCULAR; INTRAVENOUS at 14:47

## 2018-09-09 RX ADMIN — ASPIRIN SCH MG: 81 TABLET, COATED ORAL at 12:54

## 2018-09-09 RX ADMIN — FAMOTIDINE SCH MG: 20 TABLET ORAL at 12:54

## 2018-09-09 RX ADMIN — MIDODRINE HYDROCHLORIDE SCH MG: 5 TABLET ORAL at 21:47

## 2018-09-09 RX ADMIN — FUROSEMIDE SCH: 10 INJECTION, SOLUTION INTRAVENOUS at 19:31

## 2018-09-09 RX ADMIN — FUROSEMIDE SCH MG: 10 INJECTION, SOLUTION INTRAVENOUS at 05:49

## 2018-09-09 RX ADMIN — ENOXAPARIN SODIUM SCH MG: 100 INJECTION SUBCUTANEOUS at 12:54

## 2018-09-09 RX ADMIN — ENOXAPARIN SODIUM SCH MG: 100 INJECTION SUBCUTANEOUS at 21:47

## 2018-09-09 RX ADMIN — MIDODRINE HYDROCHLORIDE SCH MG: 5 TABLET ORAL at 12:53

## 2018-09-09 NOTE — PROGRESS NOTE
Assessment and Plan





clinically improving


wean pressors as tolerated


hold lasix


if bp doesn't respond, may need to add gentle hydration


Given elevated/dilated rh pressures, very narrow fluid equilibrium is noted 

with preload dependence





- Patient Problems


(1) Abnormal EKG


Current Visit: Yes   Status: Acute   





(2) Bilateral lower extremity edema


Current Visit: Yes   Status: Acute   





(3) CHF exacerbation


Current Visit: Yes   Status: Acute   


Qualifiers: 


   Heart failure type: unspecified   Qualified Code(s): I50.9 - Heart failure, 

unspecified   





(4) Paroxysmal atrial fibrillation with RVR


Current Visit: Yes   Status: Acute   





(5) TIA (transient ischemic attack)


Current Visit: Yes   Status: Acute   





(6) Cor pulmonale


Current Visit: Yes   Status: Chronic   





(7) Nonischemic cardiomyopathy


Current Visit: Yes   Status: Chronic   





(8) Mild pulmonary hypertension


Current Visit: No   Status: Chronic   





Subjective


Date of service: 09/09/18


Principal diagnosis: Acute on Chronic Hypoxemic Respiratory Failure; AE-CHF; 

CMOP; Hypotension


Interval history: 





feels better





Objective


 Vital Signs











  Temp Pulse Pulse Resp BP Pulse Ox


 


 09/09/18 10:01   99 H   21  83/65  99


 


 09/09/18 09:45   106 H   18  88/61 


 


 09/09/18 09:30   104 H   20  83/65  98


 


 09/09/18 09:15   103 H   16  86/65  99


 


 09/09/18 09:01   101 H   17  67/34  100


 


 09/09/18 08:45   97 H   16  89/64  96


 


 09/09/18 08:30   91 H   14  91/65  95


 


 09/09/18 08:15   93 H   15  89/62  94


 


 09/09/18 08:00  97.2 F L  90   16  93/64  94


 


 09/09/18 07:45   91 H   16  90/62  96


 


 09/09/18 07:40       95


 


 09/09/18 07:30   92 H   16  90/63  96


 


 09/09/18 07:15   93 H   15  89/66  96


 


 09/09/18 07:00   92 H   18  93/61  97


 


 09/09/18 06:45   95 H   17  89/66  97


 


 09/09/18 06:30   103 H   18  87/65  97


 


 09/09/18 06:15   103 H   16  97/77 


 


 09/09/18 06:00   93 H   20  94/71  97


 


 09/09/18 05:45   92 H   22  98/69  98


 


 09/09/18 05:30   92 H   17  89/65  97


 


 09/09/18 05:15   91 H   19  97/72  97


 


 09/09/18 05:00  96.5 F L  96 H   21  96/73  97


 


 09/09/18 04:45   97 H   20  99/69  96


 


 09/09/18 04:30   94 H   21  95/71  97


 


 09/09/18 04:15   93 H   22  97/71  96


 


 09/09/18 04:00   92 H   20  96/73  97


 


 09/09/18 03:50   95 H   21  91/70  97


 


 09/09/18 03:45   92 H   18  91/70  96


 


 09/09/18 03:30   94 H   20  98/73  97


 


 09/09/18 03:15   93 H   21  92/70  97


 


 09/09/18 03:00   92 H   19  99/71  99


 


 09/09/18 02:45   93 H   21  95/71  97


 


 09/09/18 02:30   91 H   19  97/73  96


 


 09/09/18 02:15   96 H   21  94/70  97


 


 09/09/18 02:00   90   18  97/67  96


 


 09/09/18 01:45   90   20  93/73  96


 


 09/09/18 01:30   91 H   20  89/74  96


 


 09/09/18 01:15   96 H   20  88/69  96


 


 09/09/18 01:00   90   20  98/77  96


 


 09/09/18 00:45   93 H   21  98/77 


 


 09/09/18 00:30   89   18  102/78 


 


 09/09/18 00:15   89   21  106/80  96


 


 09/09/18 00:00   94 H   21  104/72  97


 


 09/08/18 23:45   90   22  97/76  96


 


 09/08/18 23:31   98 H   24  89/66  96


 


 09/08/18 23:15   88   20  112/86  96


 


 09/08/18 23:02   92 H   21  113/80  94


 


 09/08/18 23:00   95 H   15  113/80  90


 


 09/08/18 22:45   94 H   19  100/74  89


 


 09/08/18 22:30   99 H   19  95/68  90


 


 09/08/18 22:15   101 H   18  91/67  92


 


 09/08/18 22:00   89   15  97/70  94


 


 09/08/18 21:45   96 H   15  99/68  94


 


 09/08/18 21:30   104 H   20  94/62  93


 


 09/08/18 21:27   105 H   17  89/65  93


 


 09/08/18 21:15   102 H   20  89/65  92


 


 09/08/18 21:00   100 H   19  91/66  91


 


 09/08/18 20:45   99 H   18  88/67  92


 


 09/08/18 20:30   99 H   17  94/67  96


 


 09/08/18 20:15   101 H   18  92/69 


 


 09/08/18 20:14       96


 


 09/08/18 20:00  97.2 F L  102 H   20  88/67  93


 


 09/08/18 19:45   104 H   21  98/71  98


 


 09/08/18 19:41   105 H    


 


 09/08/18 19:30   104 H   17  95/69  98


 


 09/08/18 19:15   101 H   22  96/68  94


 


 09/08/18 19:00   102 H   29 H  92/68  95


 


 09/08/18 18:45   107 H   18  97/69  100


 


 09/08/18 18:30   101 H   23  96/65  94


 


 09/08/18 18:15   113 H   18  94/60  97


 


 09/08/18 18:00   104 H   11 L  73/47  99


 


 09/08/18 17:45   97 H   18  82/57  93


 


 09/08/18 17:30   99 H   17  79/55  93


 


 09/08/18 17:15   100 H   16  84/56  89


 


 09/08/18 17:00   101 H   18  80/56  89


 


 09/08/18 16:45   96 H   18  81/53  89


 


 09/08/18 16:30   99 H   17  86/57  93


 


 09/08/18 16:15   107 H   20  82/49  92


 


 09/08/18 16:00  97.2 F L  104 H  104 H  19  93/66  95


 


 09/08/18 15:45   109 H   22  93/66  100


 


 09/08/18 15:30   102 H   17  89/65  99


 


 09/08/18 15:15   107 H   19  94/71  93


 


 09/08/18 15:01   102 H   21  94/61  95


 


 09/08/18 14:45   103 H   18  85/66  93


 


 09/08/18 14:30   103 H   12  84/58  94


 


 09/08/18 14:15   106 H   17  87/61  95


 


 09/08/18 14:00   108 H   16  81/58 


 


 09/08/18 13:45   109 H   13  97/62  96


 


 09/08/18 13:30   98 H   17  91/69  96


 


 09/08/18 13:15   94 H   17  95/69  94


 


 09/08/18 13:00   102 H   16  88/62  93


 


 09/08/18 12:45   94 H   17  101/75  98


 


 09/08/18 12:31   96 H   25 H  104/86 


 


 09/08/18 12:15   106 H   23  87/65 


 


 09/08/18 12:00  97.2 F L  97 H  107 H  19  104/70  94














- Physical Examination


General: Other (on BiPAP)


HEENT: Positive: PERRL, Normocephaly, Mucus Membranes Moist


Neck: Positive: neck supple, trachea midline


Neuro: Positive: Grossly Intact


Abdomen: Positive: Soft.  Negative: Tender


Skin: Negative: Wound


Extremities: Present: +2 Edema (BLE)





- Labs and Meds


 Comprehensive Metabolic Panel











  09/09/18 Range/Units





  05:15 


 


Sodium  137  (137-145)  mmol/L


 


Potassium  3.8  (3.6-5.0)  mmol/L


 


Chloride  97.8 L  ()  mmol/L


 


Carbon Dioxide  25  (22-30)  mmol/L


 


BUN  31 H  (7-17)  mg/dL


 


Creatinine  0.9  (0.7-1.2)  mg/dL


 


Glucose  116 H  ()  mg/dL


 


Calcium  9.0  (8.4-10.2)  mg/dL














- Imaging and Cardiology


EKG: report reviewed, image reviewed


Echo: report reviewed (7/9/2018: VICENTE; EF 45-50%, marked RA enlargement, mod RV 

enlargement, mod RV systolic dysfunction, trace MR, mod to severe TR, mild pulm 

HTN with RVSP 38mmHg, mod left to right shunt suggestive of ostium secundum 

atrial septal defect)


Cardiac cath: report reviewed (7/10/2018: LHC & RHC, NO ASD, no O2 sat step-up, 

EF 35%, normal coronaries, mod pulmonary HTN. )





- Allied health notes


Allied health notes reviewed: nursing

## 2018-09-09 NOTE — PROGRESS NOTE
Assessment and Plan








Syncope


Acute on chronic respiratory failure (2nd to CHF, fluid overload)


Orthopnea


Chronic Biventricular CHF with acute exacerbation


NICMOP


Paroxysmal Atrial Fibrillation


H/O Pulmonary HTN


PFO


Hypotension (h/o Hypertension)


Hypokalemia (likely due to diuretic)


Moderate protein calorie malnutrition





- continue full anticoagulation for A-fib


- continue supplemental oxygen to keep sats > 90% (HFNC)


- continue BIPAP at  increased settings of 14/10 (Scheduled qhs)


- continue bronchodilators with pulmonary hygiene per RT


- follow  am CXR


- continue gentle diuresis


- continue dopamine per cardiology recommendations


- continue aspiration precautions


- increase Pepcid to bid re: reflux symptoms


- PT/OT as tolerated


- mobility protocol for pressure ulcer prophylaxis


- target MAP > 65mmHg


- continue other care per attending / other consultants





The high probability of a clinically significant, sudden or life-threatening 

deterioration of the [cardiac, respiratory] system(s) required my full and 

direct attention, intervention and personal management. The aggregate critical 

care time was [32] minutes without overlap. Time includes spent on;





[x] Data Review and interpretation 





[x] Patient assessment and monitoring of vital signs 





[x] Documentation 





[x] Medication orders and management











Subjective


Date of service: 09/09/18


Principal diagnosis: Acute on Chronic Hypoxemic Respiratory Failure; AE-CHF; 

CMOP; Hypotension


Interval history: 





Patient is seen today for: Acute on Chronic Hypoxemic Respiratory Failure; AE-

CHF; CMOP; Hypotension; Symptomatic Bradycardia





Seen and examined at bedside; 24hour events reviewed; nursing and respiratory 

care staff consulted; no adverse overnight events reported to me; remains on 

dopamine; slowly weaning oxygen; denies acute chest pains or palpitations





Objective


 Vital Signs - 12hr











  09/09/18 09/09/18 09/09/18





  02:30 02:45 03:00


 


Temperature   


 


Pulse Rate 91 H 93 H 92 H


 


Respiratory 19 21 19





Rate   


 


Blood Pressure 97/73 95/71 99/71


 


O2 Sat by Pulse 96 97 99





Oximetry   














  09/09/18 09/09/18 09/09/18





  03:15 03:30 03:45


 


Temperature   


 


Pulse Rate 93 H 94 H 92 H


 


Respiratory 21 20 18





Rate   


 


Blood Pressure 92/70 98/73 91/70


 


O2 Sat by Pulse 97 97 96





Oximetry   














  09/09/18 09/09/18 09/09/18





  03:50 04:00 04:15


 


Temperature   


 


Pulse Rate 95 H 92 H 93 H


 


Respiratory 21 20 22





Rate   


 


Blood Pressure 91/70 96/73 97/71


 


O2 Sat by Pulse 97 97 96





Oximetry   














  09/09/18 09/09/18 09/09/18





  04:30 04:45 05:00


 


Temperature   96.5 F L


 


Pulse Rate 94 H 97 H 96 H


 


Respiratory 21 20 21





Rate   


 


Blood Pressure 95/71 99/69 96/73


 


O2 Sat by Pulse 97 96 97





Oximetry   














  09/09/18 09/09/18 09/09/18





  05:15 05:30 05:45


 


Temperature   


 


Pulse Rate 91 H 92 H 92 H


 


Respiratory 19 17 22





Rate   


 


Blood Pressure 97/72 89/65 98/69


 


O2 Sat by Pulse 97 97 98





Oximetry   














  09/09/18 09/09/18 09/09/18





  06:00 06:15 06:30


 


Temperature   


 


Pulse Rate 93 H 103 H 103 H


 


Respiratory 20 16 18





Rate   


 


Blood Pressure 94/71 97/77 87/65


 


O2 Sat by Pulse 97  97





Oximetry   














  09/09/18 09/09/18 09/09/18





  06:45 07:00 07:15


 


Temperature   


 


Pulse Rate 95 H 92 H 93 H


 


Respiratory 17 18 15





Rate   


 


Blood Pressure 89/66 93/61 89/66


 


O2 Sat by Pulse 97 97 96





Oximetry   














  09/09/18 09/09/18 09/09/18





  07:30 07:40 07:45


 


Temperature   


 


Pulse Rate 92 H  91 H


 


Respiratory 16  16





Rate   


 


Blood Pressure 90/63  90/62


 


O2 Sat by Pulse 96 95 96





Oximetry   














  09/09/18 09/09/18 09/09/18





  08:00 08:15 08:30


 


Temperature 97.2 F L  


 


Pulse Rate 90 93 H 91 H


 


Respiratory 16 15 14





Rate   


 


Blood Pressure 93/64 89/62 91/65


 


O2 Sat by Pulse 94 94 95





Oximetry   














  09/09/18 09/09/18 09/09/18





  08:45 09:01 09:15


 


Temperature   


 


Pulse Rate 97 H 101 H 103 H


 


Respiratory 16 17 16





Rate   


 


Blood Pressure 89/64 67/34 86/65


 


O2 Sat by Pulse 96 100 99





Oximetry   














  09/09/18 09/09/18 09/09/18





  09:30 09:45 10:01


 


Temperature   


 


Pulse Rate 104 H 106 H 99 H


 


Respiratory 20 18 21





Rate   


 


Blood Pressure 83/65 88/61 83/65


 


O2 Sat by Pulse 98  99





Oximetry   














  09/09/18





  12:00


 


Temperature 97.6 F


 


Pulse Rate 


 


Respiratory 





Rate 


 


Blood Pressure 


 


O2 Sat by Pulse 





Oximetry 











Constitutional: alert, appears uncomfortable, other (elderly looking AAF, 

normocephalic and atraumatic with increased respiratory effort)


Eyes: non-icteric


ENT: oropharynx moist, other ([place] 3)


Neck: supple, no lymphadenopathy, other (No thyromegaly)


Effort: mildly labored


Ascultation: Bilateral: diminished breath sounds, rales (inspiratory in bases)


Percussion: Bilateral: not dull


Cardiovascular: irregular rhythm, other (No R/M)


Gastrointestinal: normoactive bowel sounds, soft, non-tender, non-distended, 

other (No HSM)


Integumentary: rash


Extremities: no cyanosis, pulses normal, no ischemia or petechiae, edema (2++)


Neurologic: normal mental status, non-focal exam, pupils equal and round, motor 

strength normal and


Psychiatric: mood appropriate, depressed (affect)


CBC and BMP: 


 09/10/18 05:00





 09/10/18 05:00


ABG, PT/INR, D-dimer: 


ABG











POC ABG pH  7.394  (7.35-7.45)   09/07/18  14:13    


 


POC ABG pCO2  41.4  (35-45)   09/07/18  14:13    


 


POC ABG pO2  62  ()  L  09/07/18  14:13    


 


POC ABG HCO3  25.3   09/07/18  14:13    


 


POC ABG Total CO2  27   09/07/18  14:13    


 


POC ABG O2 Sat  91   09/07/18  14:13    





PT/INR, D-dimer











PT  16.7 Sec. (12.2-14.9)  H  08/31/18  23:23    


 


INR  1.30  (0.87-1.13)  H  08/31/18  23:23    


 


D-Dimer  1149.50 ng/mlDDU (0-234)  H  08/31/18  23:23    








Abnormal lab findings: 


 Abnormal Labs











  08/31/18 08/31/18 08/31/18





  23:23 23:23 23:23


 


WBC   


 


Hgb   14.9 H 


 


Hct   46.0 H 


 


RDW   18.0 H 


 


Lymph % (Auto)   


 


Mono % (Auto)   


 


Baso % (Auto)   


 


Lymph #   


 


Seg Neutrophils %   


 


Seg Neutrophils #   


 


PT    16.7 H


 


INR    1.30 H


 


D-Dimer    1149.50 H


 


POC ABG pO2   


 


Sodium  136 L  


 


Potassium   


 


Chloride  94.8 L  


 


Carbon Dioxide   


 


BUN  36 H  


 


Glucose  192 H  


 


POC Glucose   


 


Total Bilirubin   


 


Direct Bilirubin   


 


CK-MB (CK-2)   


 


CK-MB (CK-2) Rel Index   


 


NT-Pro-B Natriuret Pep  4255 H  


 


Albumin   


 


LDL Cholesterol Direct   














  08/31/18 09/01/18 09/02/18





  23:23 02:59 08:00


 


WBC   


 


Hgb   


 


Hct   


 


RDW   


 


Lymph % (Auto)   


 


Mono % (Auto)   


 


Baso % (Auto)   


 


Lymph #   


 


Seg Neutrophils %   


 


Seg Neutrophils #   


 


PT   


 


INR   


 


D-Dimer   


 


POC ABG pO2   


 


Sodium    136 L


 


Potassium   


 


Chloride   


 


Carbon Dioxide   


 


BUN    34 H


 


Glucose    105 H


 


POC Glucose   186 H 


 


Total Bilirubin  1.80 H  


 


Direct Bilirubin  0.6 H  


 


CK-MB (CK-2)   


 


CK-MB (CK-2) Rel Index   


 


NT-Pro-B Natriuret Pep   


 


Albumin  3.6 L  


 


LDL Cholesterol Direct   














  09/03/18 09/03/18 09/04/18





  07:36 07:36 08:02


 


WBC  3.7 L   3.1 L


 


Hgb  14.6 H  


 


Hct  43.5 H  


 


RDW  17.3 H   17.7 H


 


Lymph % (Auto)  39.9 H   42.9 H


 


Mono % (Auto)  9.0 H   8.3 H


 


Baso % (Auto)   


 


Lymph #   


 


Seg Neutrophils %   


 


Seg Neutrophils #  1.7 L   1.4 L


 


PT   


 


INR   


 


D-Dimer   


 


POC ABG pO2   


 


Sodium   135 L 


 


Potassium   


 


Chloride   


 


Carbon Dioxide   21 L 


 


BUN   33 H 


 


Glucose   


 


POC Glucose   


 


Total Bilirubin   


 


Direct Bilirubin   


 


CK-MB (CK-2)   


 


CK-MB (CK-2) Rel Index   


 


NT-Pro-B Natriuret Pep   


 


Albumin   


 


LDL Cholesterol Direct   














  09/04/18 09/04/18 09/05/18





  08:02 08:02 05:57


 


WBC   


 


Hgb   


 


Hct   


 


RDW   


 


Lymph % (Auto)   


 


Mono % (Auto)   


 


Baso % (Auto)   


 


Lymph #   


 


Seg Neutrophils %   


 


Seg Neutrophils #   


 


PT   


 


INR   


 


D-Dimer   


 


POC ABG pO2   


 


Sodium    133 L


 


Potassium   


 


Chloride    97.4 L


 


Carbon Dioxide  21 L   17 L


 


BUN  32 H   30 H


 


Glucose    117 H


 


POC Glucose   


 


Total Bilirubin  1.30 H  


 


Direct Bilirubin   


 


CK-MB (CK-2)   


 


CK-MB (CK-2) Rel Index   


 


NT-Pro-B Natriuret Pep   


 


Albumin  3.0 L  


 


LDL Cholesterol Direct   42 L 














  09/05/18 09/05/18 09/05/18





  05:57 17:05 17:05


 


WBC  4.0 L  


 


Hgb   


 


Hct   


 


RDW  17.8 H  


 


Lymph % (Auto)   


 


Mono % (Auto)  7.4 H  


 


Baso % (Auto)   


 


Lymph #  0.7 L  


 


Seg Neutrophils %  73.2 H  


 


Seg Neutrophils #   


 


PT   


 


INR   


 


D-Dimer   


 


POC ABG pO2   


 


Sodium   


 


Potassium   


 


Chloride   


 


Carbon Dioxide   


 


BUN   


 


Glucose   


 


POC Glucose   


 


Total Bilirubin   


 


Direct Bilirubin   


 


CK-MB (CK-2)    4.8 H


 


CK-MB (CK-2) Rel Index    8.1 H


 


NT-Pro-B Natriuret Pep   3883 H 


 


Albumin   


 


LDL Cholesterol Direct   














  09/06/18 09/06/18 09/06/18





  05:15 05:15 08:28


 


WBC   


 


Hgb   


 


Hct   


 


RDW  16.9 H  


 


Lymph % (Auto)   


 


Mono % (Auto)   


 


Baso % (Auto)   


 


Lymph #  1.0 L  


 


Seg Neutrophils %  70.4 H  


 


Seg Neutrophils #   


 


PT   


 


INR   


 


D-Dimer   


 


POC ABG pO2    65 L


 


Sodium   


 


Potassium   3.4 L D 


 


Chloride   


 


Carbon Dioxide   


 


BUN   25 H 


 


Glucose   122 H 


 


POC Glucose   


 


Total Bilirubin   


 


Direct Bilirubin   


 


CK-MB (CK-2)   


 


CK-MB (CK-2) Rel Index   


 


NT-Pro-B Natriuret Pep   


 


Albumin   


 


LDL Cholesterol Direct   














  09/07/18 09/07/18 09/07/18





  04:37 04:37 14:13


 


WBC   


 


Hgb   


 


Hct   


 


RDW   17.4 H 


 


Lymph % (Auto)   


 


Mono % (Auto)   


 


Baso % (Auto)   


 


Lymph #   1.0 L 


 


Seg Neutrophils %   70.7 H 


 


Seg Neutrophils #   


 


PT   


 


INR   


 


D-Dimer   


 


POC ABG pO2    62 L


 


Sodium   


 


Potassium   


 


Chloride   


 


Carbon Dioxide   


 


BUN  26 H  


 


Glucose  137 H  


 


POC Glucose   


 


Total Bilirubin  2.10 H  


 


Direct Bilirubin  1.0 H  


 


CK-MB (CK-2)   


 


CK-MB (CK-2) Rel Index   


 


NT-Pro-B Natriuret Pep   


 


Albumin  3.1 L  


 


LDL Cholesterol Direct   














  09/08/18 09/08/18 09/09/18





  04:20 04:20 05:15


 


WBC  4.1 L  


 


Hgb   


 


Hct  44.0 H  


 


RDW  18.1 H  


 


Lymph % (Auto)   


 


Mono % (Auto)   


 


Baso % (Auto)  2.7 H  


 


Lymph #   


 


Seg Neutrophils %   


 


Seg Neutrophils #   


 


PT   


 


INR   


 


D-Dimer   


 


POC ABG pO2   


 


Sodium   136 L 


 


Potassium   


 


Chloride   97.8 L  97.8 L


 


Carbon Dioxide   


 


BUN   29 H  31 H


 


Glucose   113 H  116 H


 


POC Glucose   


 


Total Bilirubin   


 


Direct Bilirubin   


 


CK-MB (CK-2)   


 


CK-MB (CK-2) Rel Index   


 


NT-Pro-B Natriuret Pep   


 


Albumin   


 


LDL Cholesterol Direct   











Allied health notes reviewed: nursing

## 2018-09-09 NOTE — PROGRESS NOTE
Assessment and Plan


Assessment and plan: 


--Acute on chronic respiratory failure; and due to acute on chronic CHF 


on intermittent BiPAP Patient is saturating well on nasal cannula oxygen this 

morning


evaluate for home oxygen at discharge





--Hypotension; patient is on midodrine and dopamine ,blood pressures are still 

in the lower range, 


Beta blockers and ace inhibitors are on hold, Lasix as needed, cardiology 

following





--New onset A. fib with rapid ventricular rate; paroxysmal


 rate controlled intermittent RVR,anticoagulation with Lovenox,transition to 

eliquis when pt is stable





--Acute on chronic systolic congestive heart failure; with severe lower 

extremity edema


Ejection fraction 45-50%, oxygen , elevated the limbs, low-sodium diet, fluid 

restriction


beta blockers, ace inhibitors are on hold in view of hypotension, Lasix as 

tolerated .





--Mild malnutrition; supportive care and nutrition supplements





--DVT prophylaxis; Lovenox





--Full CODE STATUS





Physical therapy occupational therapy as tolerated 


Closely monitor and adjust the management as needed


Plan of care discussed with cardiology


Continue current management





critical care time 35 minutes














History


Interval history: 


Patient seen and examined medical records reviewed


Patient remains hypotensive on dopamine,


Feels slightly better denies chest pain shortness of breathy


Vital signs reviewed








Hospitalist Physical





- Constitutional


Vitals: 


 











Temp Pulse Resp BP Pulse Ox


 


 97.2 F L  99 H  21   83/65   99 


 


 09/09/18 08:00  09/09/18 10:01  09/09/18 10:01  09/09/18 10:01  09/09/18 10:01











General appearance: Present: no acute distress, well-nourished, other (nasal 

cannula oxygen)





- EENT


Eyes: Present: PERRL, EOM intact





- Neck


Neck: Present: supple, normal ROM





- Respiratory


Respiratory effort: normal, labored





- Cardiovascular


Rhythm: regular


Heart Sounds: Present: S1 & S2





- Extremities


Extremities: no ischemia, pulses intact, pulses symmetrical


Peripheral Pulses: within normal limits





- Abdominal


General gastrointestinal: soft, non-tender, non-distended, normal bowel sounds





- Integumentary


Integumentary: Present: clear, warm





- Psychiatric


Psychiatric: appropriate mood/affect, cooperative





- Neurologic


Neurologic: CNII-XII intact, moves all extremities





Results





- Labs


CBC & Chem 7: 


 09/08/18 04:20





 09/09/18 05:15


Labs: 


 Laboratory Last Values











WBC  4.1 K/mm3 (4.5-11.0)  L  09/08/18  04:20    


 


RBC  4.73 M/mm3 (3.65-5.03)   09/08/18  04:20    


 


Hgb  14.2 gm/dl (10.1-14.3)   09/08/18  04:20    


 


Hct  44.0 % (30.3-42.9)  H  09/08/18  04:20    


 


MCV  93 fl (79-97)   09/08/18  04:20    


 


MCH  30 pg (28-32)   09/08/18  04:20    


 


MCHC  32 % (30-34)   09/08/18  04:20    


 


RDW  18.1 % (13.2-15.2)  H  09/08/18  04:20    


 


Plt Count  232 K/mm3 (140-440)   09/08/18  04:20    


 


Lymph % (Auto)  29.3 % (13.4-35.0)   09/08/18  04:20    


 


Mono % (Auto)  6.7 % (0.0-7.3)   09/08/18  04:20    


 


Eos % (Auto)  1.9 % (0.0-4.3)   09/08/18  04:20    


 


Baso % (Auto)  2.7 % (0.0-1.8)  H  09/08/18  04:20    


 


Lymph #  1.2 K/mm3 (1.2-5.4)   09/08/18  04:20    


 


Mono #  0.3 K/mm3 (0.0-0.8)   09/08/18  04:20    


 


Eos #  0.1 K/mm3 (0.0-0.4)   09/08/18  04:20    


 


Baso #  0.1 K/mm3 (0.0-0.1)   09/08/18  04:20    


 


Seg Neutrophils %  59.4 % (40.0-70.0)   09/08/18  04:20    


 


Seg Neutrophils #  2.5 K/mm3 (1.8-7.7)   09/08/18  04:20    


 


PT  16.7 Sec. (12.2-14.9)  H  08/31/18  23:23    


 


INR  1.30  (0.87-1.13)  H  08/31/18  23:23    


 


APTT  29.2 Sec. (24.2-36.6)   08/31/18  23:23    


 


D-Dimer  1149.50 ng/mlDDU (0-234)  H  08/31/18  23:23    


 


POC ABG pH  7.394  (7.35-7.45)   09/07/18  14:13    


 


POC ABG pCO2  41.4  (35-45)   09/07/18  14:13    


 


POC ABG pO2  62  ()  L  09/07/18  14:13    


 


POC ABG HCO3  25.3   09/07/18  14:13    


 


POC ABG Total CO2  27   09/07/18  14:13    


 


POC ABG O2 Sat  91   09/07/18  14:13    


 


POC ABG Base Excess  0   09/07/18  14:13    


 


FiO2  50 %  09/07/18  14:13    


 


Sodium  137 mmol/L (137-145)   09/09/18  05:15    


 


Potassium  3.8 mmol/L (3.6-5.0)   09/09/18  05:15    


 


Chloride  97.8 mmol/L ()  L  09/09/18  05:15    


 


Carbon Dioxide  25 mmol/L (22-30)   09/09/18  05:15    


 


Anion Gap  18 mmol/L  09/09/18  05:15    


 


BUN  31 mg/dL (7-17)  H  09/09/18  05:15    


 


Creatinine  0.9 mg/dL (0.7-1.2)   09/09/18  05:15    


 


Estimated GFR  > 60 ml/min  09/09/18  05:15    


 


BUN/Creatinine Ratio  34 %  09/09/18  05:15    


 


Glucose  116 mg/dL ()  H  09/09/18  05:15    


 


POC Glucose  186  ()  H  09/01/18  02:59    


 


Calcium  9.0 mg/dL (8.4-10.2)   09/09/18  05:15    


 


Phosphorus  3.00 mg/dL (2.5-4.5)   09/05/18  05:57    


 


Magnesium  1.90 mg/dL (1.7-2.3)   09/08/18  04:20    


 


Total Bilirubin  2.10 mg/dL (0.1-1.2)  H  09/07/18  04:37    


 


Direct Bilirubin  1.0 mg/dL (0-0.2)  H  09/07/18  04:37    


 


Indirect Bilirubin  1.1 mg/dL  09/07/18  04:37    


 


AST  18 units/L (5-40)   09/07/18  04:37    


 


ALT  23 units/L (7-56)   09/07/18  04:37    


 


Alkaline Phosphatase  111 units/L ()   09/07/18  04:37    


 


Total Creatine Kinase  59 units/L ()   09/05/18  17:05    


 


CK-MB (CK-2)  4.8 ng/mL (0.0-4.0)  H  09/05/18  17:05    


 


CK-MB (CK-2) Rel Index  8.1  (0-4)  H  09/05/18  17:05    


 


Troponin T  < 0.010 ng/mL (0.00-0.029)   09/05/18  17:05    


 


NT-Pro-B Natriuret Pep  3883 pg/mL (0-900)  H  09/05/18  17:05    


 


Total Protein  6.8 g/dL (6.3-8.2)   09/07/18  04:37    


 


Albumin  3.1 g/dL (3.9-5)  L  09/07/18  04:37    


 


Albumin/Globulin Ratio  0.8 %  09/07/18  04:37    


 


Triglycerides  54 mg/dL (2-149)   09/04/18  08:02    


 


Cholesterol  95 mg/dL ()   09/04/18  08:02    


 


LDL Cholesterol Direct  42 mg/dL ()  L  09/04/18  08:02    


 


HDL Cholesterol  49 mg/dL (40-59)   09/04/18  08:02    


 


Cholesterol/HDL Ratio  1.93 %  09/04/18  08:02

## 2018-09-10 LAB
BASOPHILS # (AUTO): 0.1 K/MM3 (ref 0–0.1)
BASOPHILS NFR BLD AUTO: 1.5 % (ref 0–1.8)
BUN SERPL-MCNC: 31 MG/DL (ref 7–17)
BUN/CREAT SERPL: 34 %
CALCIUM SERPL-MCNC: 9 MG/DL (ref 8.4–10.2)
EOSINOPHIL # BLD AUTO: 0.1 K/MM3 (ref 0–0.4)
EOSINOPHIL NFR BLD AUTO: 1.7 % (ref 0–4.3)
HCT VFR BLD CALC: 48 % (ref 30.3–42.9)
HEMOLYSIS INDEX: 16
HGB BLD-MCNC: 15.7 GM/DL (ref 10.1–14.3)
LYMPHOCYTES # BLD AUTO: 1.3 K/MM3 (ref 1.2–5.4)
LYMPHOCYTES NFR BLD AUTO: 35.4 % (ref 13.4–35)
MCH RBC QN AUTO: 30 PG (ref 28–32)
MCHC RBC AUTO-ENTMCNC: 33 % (ref 30–34)
MCV RBC AUTO: 92 FL (ref 79–97)
MONOCYTES # (AUTO): 0.2 K/MM3 (ref 0–0.8)
MONOCYTES % (AUTO): 6 % (ref 0–7.3)
PLATELET # BLD: 264 K/MM3 (ref 140–440)
RBC # BLD AUTO: 5.22 M/MM3 (ref 3.65–5.03)

## 2018-09-10 PROCEDURE — 5A09357 ASSISTANCE WITH RESPIRATORY VENTILATION, LESS THAN 24 CONSECUTIVE HOURS, CONTINUOUS POSITIVE AIRWAY PRESSURE: ICD-10-PCS | Performed by: INTERNAL MEDICINE

## 2018-09-10 RX ADMIN — FAMOTIDINE SCH MG: 20 TABLET ORAL at 10:29

## 2018-09-10 RX ADMIN — ASPIRIN SCH MG: 81 TABLET, COATED ORAL at 10:29

## 2018-09-10 RX ADMIN — FUROSEMIDE SCH: 10 INJECTION, SOLUTION INTRAVENOUS at 06:23

## 2018-09-10 RX ADMIN — ENOXAPARIN SODIUM SCH MG: 100 INJECTION SUBCUTANEOUS at 22:31

## 2018-09-10 RX ADMIN — FAMOTIDINE SCH MG: 20 TABLET ORAL at 22:30

## 2018-09-10 RX ADMIN — ENOXAPARIN SODIUM SCH MG: 100 INJECTION SUBCUTANEOUS at 10:29

## 2018-09-10 RX ADMIN — MIDODRINE HYDROCHLORIDE SCH MG: 5 TABLET ORAL at 23:00

## 2018-09-10 RX ADMIN — MIDODRINE HYDROCHLORIDE SCH MG: 5 TABLET ORAL at 14:33

## 2018-09-10 RX ADMIN — MIDODRINE HYDROCHLORIDE SCH MG: 5 TABLET ORAL at 10:29

## 2018-09-10 NOTE — PROGRESS NOTE
Assessment and Plan








Syncope


Acute on chronic respiratory failure (2nd to CHF, fluid overload)


Orthopnea


Chronic Biventricular CHF with acute exacerbation


NICMOP


Paroxysmal Atrial Fibrillation


H/O Pulmonary HTN


PFO


Hypotension (h/o Hypertension)


Hypokalemia (likely due to diuretic)


Moderate protein calorie malnutrition





- continue full anticoagulation for A-fib


- continue supplemental oxygen to keep sats > 90% (HFNC)


- continue BIPAP at  increased settings of 14/10 (Scheduled qhs)


- continue bronchodilators with pulmonary hygiene per RT


- follow  am CXR


- continue gentle diuresis


- continue dopamine per cardiology recommendations


- continue aspiration precautions


- increase Pepcid to bid re: reflux symptoms


- PT/OT as tolerated


- mobility protocol for pressure ulcer prophylaxis


- target MAP > 65mmHg


- continue other care per attending / other consultants





The high probability of a clinically significant, sudden or life-threatening 

deterioration of the [cardiac, respiratory] system(s) required my full and 

direct attention, intervention and personal management. The aggregate critical 

care time was [35] minutes without overlap. Time includes spent on;





[x] Data Review and interpretation 





[x] Patient assessment and monitoring of vital signs 





[x] Documentation 





[x] Medication orders and management











Subjective


Date of service: 09/10/18


Principal diagnosis: Acute on Chronic Hypoxemic Respiratory Failure; AE-CHF; 

CMOP; Hypotension


Interval history: 





Patient is seen today for: Acute on Chronic Hypoxemic Respiratory Failure; AE-

CHF; CMOP; Hypotension; Symptomatic Bradycardia





Seen and examined at bedside; 24hour events reviewed; nursing and respiratory 

care staff consulted; no adverse overnight events reported to me; remains on 

dopamine; remains hypoxemic but overall some improvement; No N/V/F/C





Objective


 Vital Signs - 12hr











  09/10/18 09/10/18 09/10/18





  02:00 02:15 02:30


 


Temperature   


 


Pulse Rate 91 H 96 H 97 H


 


Respiratory 20 22 19





Rate   


 


Blood Pressure 100/75 100/75 101/75


 


O2 Sat by Pulse 96  





Oximetry   














  09/10/18 09/10/18 09/10/18





  02:45 03:00 03:15


 


Temperature   


 


Pulse Rate 96 H 99 H 97 H


 


Respiratory 21 22 20





Rate   


 


Blood Pressure 96/73 97/73 98/72


 


O2 Sat by Pulse   





Oximetry   














  09/10/18 09/10/18 09/10/18





  03:30 03:45 04:00


 


Temperature   


 


Pulse Rate 102 H 98 H 98 H


 


Respiratory 20 22 22





Rate   


 


Blood Pressure 99/73 99/75 95/74


 


O2 Sat by Pulse   





Oximetry   














  09/10/18 09/10/18 09/10/18





  04:15 04:30 04:45


 


Temperature   


 


Pulse Rate 92 H 96 H 104 H


 


Respiratory 19 18 18





Rate   


 


Blood Pressure 97/72 92/71 92/71


 


O2 Sat by Pulse   





Oximetry   














  09/10/18 09/10/18 09/10/18





  05:00 05:15 05:30


 


Temperature   


 


Pulse Rate 93 H 101 H 101 H


 


Respiratory 19 24 17





Rate   


 


Blood Pressure 97/72 96/70 97/69


 


O2 Sat by Pulse   





Oximetry   














  09/10/18 09/10/18 09/10/18





  05:45 06:00 06:15


 


Temperature   


 


Pulse Rate 111 H 106 H 101 H


 


Respiratory 19 24 27 H





Rate   


 


Blood Pressure 92/53  87/66


 


O2 Sat by Pulse  96 94





Oximetry   














  09/10/18 09/10/18 09/10/18





  07:10 08:00 12:00


 


Temperature  97.4 F L 97.5 F L


 


Pulse Rate   


 


Respiratory  16 





Rate   


 


Blood Pressure   


 


O2 Sat by Pulse 93 93 





Oximetry   











Constitutional: alert, appears uncomfortable, other (elderly looking AAF, 

normocephalic and atraumatic with increased respiratory effort)


Eyes: non-icteric


ENT: oropharynx moist, other ([place] 3)


Neck: supple, no lymphadenopathy, other (No thyromegaly)


Effort: mildly labored


Ascultation: Bilateral: diminished breath sounds, rales (inspiratory in bases)


Percussion: Bilateral: not dull


Cardiovascular: irregular rhythm, other (No R/M)


Gastrointestinal: normoactive bowel sounds, soft, non-tender, non-distended, 

other (No HSM)


Integumentary: rash


Extremities: no cyanosis, pulses normal, no ischemia or petechiae, edema (2++)


Neurologic: normal mental status, non-focal exam, pupils equal and round, motor 

strength normal and


Psychiatric: mood appropriate, depressed (affect)


CBC and BMP: 


 09/12/18 07:00





 09/12/18 07:00


ABG, PT/INR, D-dimer: 


ABG











POC ABG pH  7.394  (7.35-7.45)   09/07/18  14:13    


 


POC ABG pCO2  41.4  (35-45)   09/07/18  14:13    


 


POC ABG pO2  62  ()  L  09/07/18  14:13    


 


POC ABG HCO3  25.3   09/07/18  14:13    


 


POC ABG Total CO2  27   09/07/18  14:13    


 


POC ABG O2 Sat  91   09/07/18  14:13    





PT/INR, D-dimer











PT  16.7 Sec. (12.2-14.9)  H  08/31/18  23:23    


 


INR  1.30  (0.87-1.13)  H  08/31/18  23:23    


 


D-Dimer  1149.50 ng/mlDDU (0-234)  H  08/31/18  23:23    








Abnormal lab findings: 


 Abnormal Labs











  08/31/18 08/31/18 08/31/18





  23:23 23:23 23:23


 


WBC   


 


RBC   


 


Hgb   14.9 H 


 


Hct   46.0 H 


 


RDW   18.0 H 


 


Lymph % (Auto)   


 


Mono % (Auto)   


 


Baso % (Auto)   


 


Lymph #   


 


Seg Neutrophils %   


 


Seg Neutrophils #   


 


PT    16.7 H


 


INR    1.30 H


 


D-Dimer    1149.50 H


 


POC ABG pO2   


 


Sodium  136 L  


 


Potassium   


 


Chloride  94.8 L  


 


Carbon Dioxide   


 


BUN  36 H  


 


Glucose  192 H  


 


POC Glucose   


 


Total Bilirubin   


 


Direct Bilirubin   


 


CK-MB (CK-2)   


 


CK-MB (CK-2) Rel Index   


 


NT-Pro-B Natriuret Pep  4255 H  


 


Albumin   


 


LDL Cholesterol Direct   














  08/31/18 09/01/18 09/02/18





  23:23 02:59 08:00


 


WBC   


 


RBC   


 


Hgb   


 


Hct   


 


RDW   


 


Lymph % (Auto)   


 


Mono % (Auto)   


 


Baso % (Auto)   


 


Lymph #   


 


Seg Neutrophils %   


 


Seg Neutrophils #   


 


PT   


 


INR   


 


D-Dimer   


 


POC ABG pO2   


 


Sodium    136 L


 


Potassium   


 


Chloride   


 


Carbon Dioxide   


 


BUN    34 H


 


Glucose    105 H


 


POC Glucose   186 H 


 


Total Bilirubin  1.80 H  


 


Direct Bilirubin  0.6 H  


 


CK-MB (CK-2)   


 


CK-MB (CK-2) Rel Index   


 


NT-Pro-B Natriuret Pep   


 


Albumin  3.6 L  


 


LDL Cholesterol Direct   














  09/03/18 09/03/18 09/04/18





  07:36 07:36 08:02


 


WBC  3.7 L   3.1 L


 


RBC   


 


Hgb  14.6 H  


 


Hct  43.5 H  


 


RDW  17.3 H   17.7 H


 


Lymph % (Auto)  39.9 H   42.9 H


 


Mono % (Auto)  9.0 H   8.3 H


 


Baso % (Auto)   


 


Lymph #   


 


Seg Neutrophils %   


 


Seg Neutrophils #  1.7 L   1.4 L


 


PT   


 


INR   


 


D-Dimer   


 


POC ABG pO2   


 


Sodium   135 L 


 


Potassium   


 


Chloride   


 


Carbon Dioxide   21 L 


 


BUN   33 H 


 


Glucose   


 


POC Glucose   


 


Total Bilirubin   


 


Direct Bilirubin   


 


CK-MB (CK-2)   


 


CK-MB (CK-2) Rel Index   


 


NT-Pro-B Natriuret Pep   


 


Albumin   


 


LDL Cholesterol Direct   














  09/04/18 09/04/18 09/05/18





  08:02 08:02 05:57


 


WBC   


 


RBC   


 


Hgb   


 


Hct   


 


RDW   


 


Lymph % (Auto)   


 


Mono % (Auto)   


 


Baso % (Auto)   


 


Lymph #   


 


Seg Neutrophils %   


 


Seg Neutrophils #   


 


PT   


 


INR   


 


D-Dimer   


 


POC ABG pO2   


 


Sodium    133 L


 


Potassium   


 


Chloride    97.4 L


 


Carbon Dioxide  21 L   17 L


 


BUN  32 H   30 H


 


Glucose    117 H


 


POC Glucose   


 


Total Bilirubin  1.30 H  


 


Direct Bilirubin   


 


CK-MB (CK-2)   


 


CK-MB (CK-2) Rel Index   


 


NT-Pro-B Natriuret Pep   


 


Albumin  3.0 L  


 


LDL Cholesterol Direct   42 L 














  09/05/18 09/05/18 09/05/18





  05:57 17:05 17:05


 


WBC  4.0 L  


 


RBC   


 


Hgb   


 


Hct   


 


RDW  17.8 H  


 


Lymph % (Auto)   


 


Mono % (Auto)  7.4 H  


 


Baso % (Auto)   


 


Lymph #  0.7 L  


 


Seg Neutrophils %  73.2 H  


 


Seg Neutrophils #   


 


PT   


 


INR   


 


D-Dimer   


 


POC ABG pO2   


 


Sodium   


 


Potassium   


 


Chloride   


 


Carbon Dioxide   


 


BUN   


 


Glucose   


 


POC Glucose   


 


Total Bilirubin   


 


Direct Bilirubin   


 


CK-MB (CK-2)    4.8 H


 


CK-MB (CK-2) Rel Index    8.1 H


 


NT-Pro-B Natriuret Pep   3883 H 


 


Albumin   


 


LDL Cholesterol Direct   














  09/06/18 09/06/18 09/06/18





  05:15 05:15 08:28


 


WBC   


 


RBC   


 


Hgb   


 


Hct   


 


RDW  16.9 H  


 


Lymph % (Auto)   


 


Mono % (Auto)   


 


Baso % (Auto)   


 


Lymph #  1.0 L  


 


Seg Neutrophils %  70.4 H  


 


Seg Neutrophils #   


 


PT   


 


INR   


 


D-Dimer   


 


POC ABG pO2    65 L


 


Sodium   


 


Potassium   3.4 L D 


 


Chloride   


 


Carbon Dioxide   


 


BUN   25 H 


 


Glucose   122 H 


 


POC Glucose   


 


Total Bilirubin   


 


Direct Bilirubin   


 


CK-MB (CK-2)   


 


CK-MB (CK-2) Rel Index   


 


NT-Pro-B Natriuret Pep   


 


Albumin   


 


LDL Cholesterol Direct   














  09/07/18 09/07/18 09/07/18





  04:37 04:37 14:13


 


WBC   


 


RBC   


 


Hgb   


 


Hct   


 


RDW   17.4 H 


 


Lymph % (Auto)   


 


Mono % (Auto)   


 


Baso % (Auto)   


 


Lymph #   1.0 L 


 


Seg Neutrophils %   70.7 H 


 


Seg Neutrophils #   


 


PT   


 


INR   


 


D-Dimer   


 


POC ABG pO2    62 L


 


Sodium   


 


Potassium   


 


Chloride   


 


Carbon Dioxide   


 


BUN  26 H  


 


Glucose  137 H  


 


POC Glucose   


 


Total Bilirubin  2.10 H  


 


Direct Bilirubin  1.0 H  


 


CK-MB (CK-2)   


 


CK-MB (CK-2) Rel Index   


 


NT-Pro-B Natriuret Pep   


 


Albumin  3.1 L  


 


LDL Cholesterol Direct   














  09/08/18 09/08/18 09/09/18





  04:20 04:20 05:15


 


WBC  4.1 L  


 


RBC   


 


Hgb   


 


Hct  44.0 H  


 


RDW  18.1 H  


 


Lymph % (Auto)   


 


Mono % (Auto)   


 


Baso % (Auto)  2.7 H  


 


Lymph #   


 


Seg Neutrophils %   


 


Seg Neutrophils #   


 


PT   


 


INR   


 


D-Dimer   


 


POC ABG pO2   


 


Sodium   136 L 


 


Potassium   


 


Chloride   97.8 L  97.8 L


 


Carbon Dioxide   


 


BUN   29 H  31 H


 


Glucose   113 H  116 H


 


POC Glucose   


 


Total Bilirubin   


 


Direct Bilirubin   


 


CK-MB (CK-2)   


 


CK-MB (CK-2) Rel Index   


 


NT-Pro-B Natriuret Pep   


 


Albumin   


 


LDL Cholesterol Direct   














  09/10/18 09/10/18





  05:00 05:00


 


WBC  3.8 L 


 


RBC  5.22 H 


 


Hgb  15.7 H 


 


Hct  48.0 H 


 


RDW  17.8 H 


 


Lymph % (Auto)  35.4 H 


 


Mono % (Auto)  


 


Baso % (Auto)  


 


Lymph #  


 


Seg Neutrophils %  


 


Seg Neutrophils #  


 


PT  


 


INR  


 


D-Dimer  


 


POC ABG pO2  


 


Sodium   135 L


 


Potassium  


 


Chloride  


 


Carbon Dioxide  


 


BUN   31 H


 


Glucose   120 H


 


POC Glucose  


 


Total Bilirubin  


 


Direct Bilirubin  


 


CK-MB (CK-2)  


 


CK-MB (CK-2) Rel Index  


 


NT-Pro-B Natriuret Pep  


 


Albumin  


 


LDL Cholesterol Direct  











Allied health notes reviewed: nursing

## 2018-09-10 NOTE — PROGRESS NOTE
Assessment and Plan


Cont present management. Wean pressors as tolerated. 


No BB and/or ACEI/ARB at this time in setting of low BPs. 


Cont full dosage lovenox BID for systemic anticoagulation in setting of 

recently noted paroxysmal atrial fibrillation. Consider conversion to OAC prior 

to hospital discharge. 





The patient has been seen in conjunction with Dr. Brody who agrees with the 

assessment and plan of care. 





- Patient Problems


(1) Biventricular congestive heart failure


Current Visit: Yes   Status: Acute   





(2) Nonischemic cardiomyopathy


Current Visit: Yes   Status: Chronic   





(3) Acute respiratory failure


Current Visit: Yes   Status: Acute   





(4) Paroxysmal atrial fibrillation with RVR


Current Visit: Yes   Status: Acute   





(5) Cor pulmonale


Current Visit: Yes   Status: Chronic   





(6) Pulmonary HTN


Current Visit: Yes   Status: Chronic   





(7) Tricuspid regurgitation


Current Visit: Yes   Status: Chronic   





(8) PFO (patent foramen ovale)


Current Visit: Yes   Status: Chronic   





(9) Hypotension


Current Visit: Yes   Status: Chronic   





(10) Hypoxia


Current Visit: Yes   Status: Chronic   





Subjective


Date of service: 09/10/18


Principal diagnosis: Acute on Chronic Hypoxemic Respiratory Failure; AE-CHF; 

CMOP; Hypotension


Interval history: 


pt resting in bed, states she is feeling okay. on HiFlo NC. BLE edema 

improving. Dopamine gtt infusing. 





Objective





  Last Vital Signs











Temp  97.4 F L  09/10/18 08:00


 


Pulse  101 H  09/10/18 06:15


 


Resp  16   09/10/18 08:00


 


BP  87/66   09/10/18 06:15


 


Pulse Ox  93   09/10/18 08:00

















- Physical Examination


General: No Apparent Distress


HEENT: Positive: PERRL, Normocephaly, Mucus Membranes Moist


Neck: Positive: neck supple, trachea midline


Cardiac: Positive: Reg Rate and Rhythm, S1/S2


Lungs: Positive: Decreased Breath Sounds


Neuro: Positive: Grossly Intact


Abdomen: Positive: Soft.  Negative: Tender


Skin: Negative: Wound


Extremities: Present: +2 Edema (BLE)





- Labs and Meds


 CBC











  09/10/18 Range/Units





  05:00 


 


WBC  3.8 L  (4.5-11.0)  K/mm3


 


RBC  5.22 H  (3.65-5.03)  M/mm3


 


Hgb  15.7 H  (10.1-14.3)  gm/dl


 


Hct  48.0 H  (30.3-42.9)  %


 


Plt Count  264  (140-440)  K/mm3


 


Lymph #  1.3  (1.2-5.4)  K/mm3


 


Mono #  0.2  (0.0-0.8)  K/mm3


 


Eos #  0.1  (0.0-0.4)  K/mm3


 


Baso #  0.1  (0.0-0.1)  K/mm3








 Comprehensive Metabolic Panel











  09/10/18 Range/Units





  05:00 


 


Sodium  135 L  (137-145)  mmol/L


 


Potassium  4.0  (3.6-5.0)  mmol/L


 


Chloride  98.0  ()  mmol/L


 


Carbon Dioxide  24  (22-30)  mmol/L


 


BUN  31 H  (7-17)  mg/dL


 


Creatinine  0.9  (0.7-1.2)  mg/dL


 


Glucose  120 H  ()  mg/dL


 


Calcium  9.0  (8.4-10.2)  mg/dL














- Imaging and Cardiology


EKG: report reviewed, image reviewed


Echo: report reviewed (7/9/2018: VICENTE; EF 45-50%, marked RA enlargement, mod RV 

enlargement, mod RV systolic dysfunction, trace MR, mod to severe TR, mild pulm 

HTN with RVSP 38mmHg, mod left to right shunt suggestive of ostium secundum 

atrial septal defect)


Cardiac cath: report reviewed (7/10/2018: LHC & RHC, NO ASD, no O2 sat step-up, 

EF 35%, normal coronaries, mod pulmonary HTN. )





- Telemetry


EKG Rhythm: Sinus Rhythm





- Allied health notes


Allied health notes reviewed: nursing

## 2018-09-10 NOTE — PROGRESS NOTE
Assessment and Plan


Assessment and plan: 


--Acute on chronic respiratory failure; patient is stable on nasal cannula 

oxygen


 BiPAP as needed , evaluate for home oxygen at discharge





--Hypotension; continue midodrine and dopamine ,blood pressures are still in 

the lower range, 


Beta blockers and ace inhibitors are on hold, Lasix as needed, cardiology 

following





--New onset paroxysmal A. fib with rapid ventricular rate; now rate controlled


Continue anticoagulation with full dose Lovenox,transition to eliquis when pt 

is stable





--Acute on chronic systolic congestive heart failure; with severe lower 

extremity edema


Ejection fraction 45-50%, oxygen , elevated the limbs, low-sodium diet, fluid 

restriction


beta blockers, ace inhibitors are on hold in view of hypotension, Lasix as 

tolerated .





--Mild malnutrition; supportive care and nutrition supplements





--DVT prophylaxis; Lovenox





--Full CODE STATUS





Continue current management


Physical therapy occupational therapy


Cardiology recommendations noted and appreciated


Possible discharge in 1-2 days if stable


Plan of care is reviewed with the patient, her nurse and the 





Critical care time 35 minutes














History


Interval history: 


Patient seen and examined in ICU this morning medical records reviewed


No new events reported by the nursing


Clinically no change, remains hypotensive on dopamine


Patient denies any chest pain or shortness of breath


Vital signs reviewed











Hospitalist Physical





- Constitutional


Vitals: 


 











Temp Pulse Resp BP Pulse Ox


 


 97.4 F L  101 H  16   87/66   93 


 


 09/10/18 08:00  09/10/18 06:15  09/10/18 08:00  09/10/18 06:15  09/10/18 08:00











General appearance: Present: no acute distress, well-nourished, other (nasal 

cannula oxygen)





- EENT


Eyes: Present: PERRL, EOM intact





- Neck


Neck: Present: supple, normal ROM





- Respiratory


Respiratory effort: normal


Respiratory: bilateral: diminished, rales, negative: rhonchi, wheezing





- Cardiovascular


Rhythm: regular


Heart Sounds: Present: S1 & S2





- Extremities


Extremities: no ischemia


Extremity abnormal: edema (bilateral edema)





- Abdominal


General gastrointestinal: soft, non-tender, non-distended, normal bowel sounds





- Integumentary


Integumentary: Present: clear, warm





- Psychiatric


Psychiatric: appropriate mood/affect, cooperative





- Neurologic


Neurologic: CNII-XII intact, moves all extremities





Results





- Labs


CBC & Chem 7: 


 09/10/18 05:00





 09/10/18 05:00


Labs: 


 Laboratory Last Values











WBC  3.8 K/mm3 (4.5-11.0)  L  09/10/18  05:00    


 


RBC  5.22 M/mm3 (3.65-5.03)  H  09/10/18  05:00    


 


Hgb  15.7 gm/dl (10.1-14.3)  H  09/10/18  05:00    


 


Hct  48.0 % (30.3-42.9)  H  09/10/18  05:00    


 


MCV  92 fl (79-97)   09/10/18  05:00    


 


MCH  30 pg (28-32)   09/10/18  05:00    


 


MCHC  33 % (30-34)   09/10/18  05:00    


 


RDW  17.8 % (13.2-15.2)  H  09/10/18  05:00    


 


Plt Count  264 K/mm3 (140-440)   09/10/18  05:00    


 


Lymph % (Auto)  35.4 % (13.4-35.0)  H  09/10/18  05:00    


 


Mono % (Auto)  6.0 % (0.0-7.3)   09/10/18  05:00    


 


Eos % (Auto)  1.7 % (0.0-4.3)   09/10/18  05:00    


 


Baso % (Auto)  1.5 % (0.0-1.8)   09/10/18  05:00    


 


Lymph #  1.3 K/mm3 (1.2-5.4)   09/10/18  05:00    


 


Mono #  0.2 K/mm3 (0.0-0.8)   09/10/18  05:00    


 


Eos #  0.1 K/mm3 (0.0-0.4)   09/10/18  05:00    


 


Baso #  0.1 K/mm3 (0.0-0.1)   09/10/18  05:00    


 


Seg Neutrophils %  55.4 % (40.0-70.0)   09/10/18  05:00    


 


Seg Neutrophils #  2.1 K/mm3 (1.8-7.7)   09/10/18  05:00    


 


PT  16.7 Sec. (12.2-14.9)  H  08/31/18  23:23    


 


INR  1.30  (0.87-1.13)  H  08/31/18  23:23    


 


APTT  29.2 Sec. (24.2-36.6)   08/31/18  23:23    


 


D-Dimer  1149.50 ng/mlDDU (0-234)  H  08/31/18  23:23    


 


POC ABG pH  7.394  (7.35-7.45)   09/07/18  14:13    


 


POC ABG pCO2  41.4  (35-45)   09/07/18  14:13    


 


POC ABG pO2  62  ()  L  09/07/18  14:13    


 


POC ABG HCO3  25.3   09/07/18  14:13    


 


POC ABG Total CO2  27   09/07/18  14:13    


 


POC ABG O2 Sat  91   09/07/18  14:13    


 


POC ABG Base Excess  0   09/07/18  14:13    


 


FiO2  50 %  09/07/18  14:13    


 


Sodium  135 mmol/L (137-145)  L  09/10/18  05:00    


 


Potassium  4.0 mmol/L (3.6-5.0)   09/10/18  05:00    


 


Chloride  98.0 mmol/L ()   09/10/18  05:00    


 


Carbon Dioxide  24 mmol/L (22-30)   09/10/18  05:00    


 


Anion Gap  17 mmol/L  09/10/18  05:00    


 


BUN  31 mg/dL (7-17)  H  09/10/18  05:00    


 


Creatinine  0.9 mg/dL (0.7-1.2)   09/10/18  05:00    


 


Estimated GFR  > 60 ml/min  09/10/18  05:00    


 


BUN/Creatinine Ratio  34 %  09/10/18  05:00    


 


Glucose  120 mg/dL ()  H  09/10/18  05:00    


 


POC Glucose  186  ()  H  09/01/18  02:59    


 


Calcium  9.0 mg/dL (8.4-10.2)   09/10/18  05:00    


 


Phosphorus  3.00 mg/dL (2.5-4.5)   09/05/18  05:57    


 


Magnesium  1.90 mg/dL (1.7-2.3)   09/10/18  05:00    


 


Total Bilirubin  2.10 mg/dL (0.1-1.2)  H  09/07/18  04:37    


 


Direct Bilirubin  1.0 mg/dL (0-0.2)  H  09/07/18  04:37    


 


Indirect Bilirubin  1.1 mg/dL  09/07/18  04:37    


 


AST  18 units/L (5-40)   09/07/18  04:37    


 


ALT  23 units/L (7-56)   09/07/18  04:37    


 


Alkaline Phosphatase  111 units/L ()   09/07/18  04:37    


 


Total Creatine Kinase  59 units/L ()   09/05/18  17:05    


 


CK-MB (CK-2)  4.8 ng/mL (0.0-4.0)  H  09/05/18  17:05    


 


CK-MB (CK-2) Rel Index  8.1  (0-4)  H  09/05/18  17:05    


 


Troponin T  < 0.010 ng/mL (0.00-0.029)   09/05/18  17:05    


 


NT-Pro-B Natriuret Pep  3883 pg/mL (0-900)  H  09/05/18  17:05    


 


Total Protein  6.8 g/dL (6.3-8.2)   09/07/18  04:37    


 


Albumin  3.1 g/dL (3.9-5)  L  09/07/18  04:37    


 


Albumin/Globulin Ratio  0.8 %  09/07/18  04:37    


 


Triglycerides  54 mg/dL (2-149)   09/04/18  08:02    


 


Cholesterol  95 mg/dL ()   09/04/18  08:02    


 


LDL Cholesterol Direct  42 mg/dL ()  L  09/04/18  08:02    


 


HDL Cholesterol  49 mg/dL (40-59)   09/04/18  08:02    


 


Cholesterol/HDL Ratio  1.93 %  09/04/18  08:02

## 2018-09-11 RX ADMIN — MIDODRINE HYDROCHLORIDE SCH MG: 5 TABLET ORAL at 04:30

## 2018-09-11 RX ADMIN — ENOXAPARIN SODIUM SCH MG: 100 INJECTION SUBCUTANEOUS at 10:02

## 2018-09-11 RX ADMIN — FAMOTIDINE SCH: 20 TABLET ORAL at 22:00

## 2018-09-11 RX ADMIN — ASPIRIN SCH MG: 81 TABLET, COATED ORAL at 10:02

## 2018-09-11 RX ADMIN — MIDODRINE HYDROCHLORIDE SCH MG: 5 TABLET ORAL at 13:00

## 2018-09-11 RX ADMIN — FUROSEMIDE SCH MG: 20 TABLET ORAL at 10:02

## 2018-09-11 RX ADMIN — MIDODRINE HYDROCHLORIDE SCH: 5 TABLET ORAL at 21:56

## 2018-09-11 RX ADMIN — FAMOTIDINE SCH: 20 TABLET ORAL at 13:00

## 2018-09-11 RX ADMIN — DOPAMINE HYDROCHLORIDE IN DEXTROSE SCH MLS/HR: 3.2 INJECTION, SOLUTION INTRAVENOUS at 14:06

## 2018-09-11 RX ADMIN — FAMOTIDINE SCH: 20 TABLET ORAL at 21:56

## 2018-09-11 RX ADMIN — FAMOTIDINE SCH MG: 20 TABLET ORAL at 10:02

## 2018-09-11 RX ADMIN — ONDANSETRON PRN MG: 2 INJECTION INTRAMUSCULAR; INTRAVENOUS at 17:59

## 2018-09-11 NOTE — PROGRESS NOTE
Assessment and Plan





- Patient Problems


(1) Biventricular congestive heart failure


Current Visit: Yes   Status: Acute   





(2) Nonischemic cardiomyopathy


Current Visit: Yes   Status: Chronic   





(3) Acute respiratory failure


Current Visit: Yes   Status: Acute   





(4) Paroxysmal atrial fibrillation with RVR


Current Visit: Yes   Status: Acute   





(5) Cor pulmonale


Current Visit: Yes   Status: Chronic   





(6) Pulmonary HTN


Current Visit: Yes   Status: Chronic   





(7) Tricuspid regurgitation


Current Visit: Yes   Status: Chronic   





(8) PFO (patent foramen ovale)


Current Visit: Yes   Status: Chronic   





(9) Hypotension


Current Visit: Yes   Status: Chronic   





(10) Hypoxia


Current Visit: Yes   Status: Chronic   





Subjective


Date of service: 09/11/18


Principal diagnosis: Acute on Chronic Hypoxemic Respiratory Failure; AE-CHF; 

CMOP; Hypotension


Interval history: 


pt resting in bed, appears comfortable. on HiFlo NC. BLE edema persists. 

Dopamine gtt infusing. 





Objective


 Vital Signs











  Temp Pulse Resp Resp BP Pulse Ox


 


 09/11/18 12:00  97.3 F L     


 


 09/11/18 11:45   105 H  16   95/60  95


 


 09/11/18 11:30   107 H  17   89/67 


 


 09/11/18 11:15   103 H  21   87/64 


 


 09/11/18 11:00   102 H  20   90/65 


 


 09/11/18 10:45   103 H  19   85/65  96


 


 09/11/18 10:30   106 H  15   92/62 


 


 09/11/18 10:15   107 H  22   89/52  92


 


 09/11/18 10:00   103 H  21   86/65 


 


 09/11/18 09:45   120 H  29 H   93/68  77 L


 


 09/11/18 09:30   104 H  22   86/64 


 


 09/11/18 09:15   107 H  24   89/67 


 


 09/11/18 09:00   107 H  25 H   92/65  99


 


 09/11/18 08:45   100 H  19   90/64  99


 


 09/11/18 08:30   98 H  23   92/66  98


 


 09/11/18 08:15   104 H  25 H   89/63 


 


 09/11/18 08:00   103 H  21   89/62  98


 


 09/11/18 07:45   103 H  19   95/61 


 


 09/11/18 07:30   102 H  21   96/66 


 


 09/11/18 07:15   101 H  21   93/67 


 


 09/11/18 07:00   97 H  19   95/68 


 


 09/11/18 06:45   100 H  18   94/71 


 


 09/11/18 06:30   101 H  18   90/69 


 


 09/11/18 06:15   102 H  17   98/70 


 


 09/11/18 06:00   100 H  17   92/67 


 


 09/11/18 05:45   102 H  17   95/65 


 


 09/11/18 05:30   95 H  15   92/67 


 


 09/11/18 05:15   98 H  17   92/66  97


 


 09/11/18 05:00   98 H  15   91/68 


 


 09/11/18 04:45   105 H  21   95/73 


 


 09/11/18 04:30   99 H  17   92/66 


 


 09/11/18 04:15   98 H  15   93/66  97


 


 09/11/18 04:00   101 H  15   97/70  96


 


 09/11/18 03:45   101 H  19   97/70 


 


 09/11/18 03:31  97.8 F     


 


 09/11/18 03:30   99 H  20   97/69 


 


 09/11/18 03:15   98 H  17   94/67  97


 


 09/11/18 03:00   100 H  15   92/66 


 


 09/11/18 02:45   103 H  22   93/71 


 


 09/11/18 02:30   110 H  26 H   90/63 


 


 09/11/18 02:15   104 H  25 H   96/72 


 


 09/11/18 02:00   108 H  22   92/66  96


 


 09/11/18 01:45   100 H  19   94/73 


 


 09/11/18 01:30   102 H  20   101/76  95


 


 09/11/18 01:15   103 H  20   95/72 


 


 09/11/18 01:00   110 H  27 H   97/73  97


 


 09/11/18 00:45   107 H  20   97/73 


 


 09/11/18 00:30   110 H  24   96/73  100


 


 09/11/18 00:15   104 H  21   93/72 


 


 09/11/18 00:00   102 H  17   94/67  96


 


 09/10/18 23:45   101 H  20   94/67 


 


 09/10/18 23:35   107 H  22   91/69  96


 


 09/10/18 23:30   108 H  24   91/69 


 


 09/10/18 23:26  97.2 F L     


 


 09/10/18 23:21   100 H  17   88/64  95


 


 09/10/18 23:15   104 H  18   89/68  95


 


 09/10/18 23:03   106 H  23   89/68  94


 


 09/10/18 23:00   109 H  29 H   89/68  94


 


 09/10/18 22:45   105 H  15   95/68 


 


 09/10/18 22:30   104 H  19   92/68  93


 


 09/10/18 22:15   106 H  20   96/69  94


 


 09/10/18 22:00   106 H  21  16  88/66  93


 


 09/10/18 21:45   104 H  30 H   91/68  92


 


 09/10/18 21:30   112 H  26 H   95/72 


 


 09/10/18 21:15   110 H  21   89/66  94


 


 09/10/18 21:00   104 H  19   92/69 


 


 09/10/18 20:45   105 H  18   98/72 


 


 09/10/18 20:30   96 H  18   89/66 


 


 09/10/18 20:15   99 H  20   87/64 


 


 09/10/18 20:00  97.5 F L  102 H  20   92/70 


 


 09/10/18 19:45   106 H  22   88/64 


 


 09/10/18 19:36       94


 


 09/10/18 19:30   135 H  34 H   102/79 


 


 09/10/18 19:15   107 H  24   92/70  96


 


 09/10/18 19:00   104 H  20   100/69  96


 


 09/10/18 18:45   108 H  28 H   95/70  95


 


 09/10/18 18:30   109 H  20   93/68  95


 


 09/10/18 18:15   105 H  19   100/68 


 


 09/10/18 18:00   107 H  23   92/68 


 


 09/10/18 17:45   101 H  18   98/71  95


 


 09/10/18 17:30   104 H  21   94/68 


 


 09/10/18 17:15   101 H  19   104/70 


 


 09/10/18 17:00   104 H  24   97/70  95


 


 09/10/18 16:45   102 H  18   98/65  95


 


 09/10/18 16:30   103 H  16   91/72  95


 


 09/10/18 16:15   101 H  16   91/67  95


 


 09/10/18 16:00  97.2 F L  106 H  16   91/68  96


 


 09/10/18 15:45   95 H  16   93/68  96


 


 09/10/18 15:30   101 H  19   92/62  96


 


 09/10/18 15:15   99 H  20   91/71  94














- Physical Examination


General: No Apparent Distress


HEENT: Positive: PERRL, Normocephaly, Mucus Membranes Moist


Neck: Positive: neck supple, trachea midline


Neuro: Positive: Grossly Intact


Abdomen: Positive: Soft.  Negative: Tender


Skin: Negative: Wound


Extremities: Present: +2 Edema (BLE)





- Imaging and Cardiology


EKG: report reviewed, image reviewed


Echo: report reviewed (7/9/2018: VICENTE; EF 45-50%, marked RA enlargement, mod RV 

enlargement, mod RV systolic dysfunction, trace MR, mod to severe TR, mild pulm 

HTN with RVSP 38mmHg, mod left to right shunt suggestive of ostium secundum 

atrial septal defect)


Cardiac cath: report reviewed (7/10/2018: LHC & RHC, NO ASD, no O2 sat step-up, 

EF 35%, normal coronaries, mod pulmonary HTN. )





- Allied health notes


Allied health notes reviewed: nursing

## 2018-09-11 NOTE — PROGRESS NOTE
Assessment and Plan


Wean pressors as tolerated. 


No BB and/or ACEI/ARB at this time in setting of low BPs. 


Cont full dosage lovenox BID for systemic anticoagulation in setting of 

recently noted paroxysmal atrial fibrillation. Consider conversion to OAC prior 

to hospital discharge. 


Chest CT from 7/2018 reviewed - concerning for chronic interstitial disease. 

Recommend further eval/management per pulmonary.


RHC recommended for further evaluation of pulmonary pressures and hemodynamic 

status. Indications, potential risks and benefits of RHC reviewed with pt and pt

's son at bedside and they are agreeable to proceed in AM. NPO after MN.  





The patient has been seen in conjunction with Dr. Valentin who agrees with the 

assessment and plan of care. 





- Patient Problems


(1) Biventricular congestive heart failure


Current Visit: Yes   Status: Acute   





(2) Nonischemic cardiomyopathy


Current Visit: Yes   Status: Chronic   





(3) Acute respiratory failure


Current Visit: Yes   Status: Acute   





(4) Paroxysmal atrial fibrillation with RVR


Current Visit: Yes   Status: Acute   





(5) Cor pulmonale


Current Visit: Yes   Status: Chronic   





(6) Pulmonary HTN


Current Visit: Yes   Status: Chronic   





(7) Tricuspid regurgitation


Current Visit: Yes   Status: Chronic   





(8) PFO (patent foramen ovale)


Current Visit: Yes   Status: Chronic   





(9) Hypotension


Current Visit: Yes   Status: Chronic   





(10) Hypoxia


Current Visit: Yes   Status: Chronic   





(11) Interstitial lung disease


Current Visit: Yes   Status: Suspected   





Subjective


Date of service: 09/11/18


Principal diagnosis: Acute on Chronic Hypoxemic Respiratory Failure; AE-CHF; 

CMOP; Hypotension


Interval history: 


pt resting in bed, appears comfortable. on HiFlo NC. BLE edema persists. 

Dopamine gtt infusing. 





Objective


 Last Vital Signs











Temp  97.8 F   09/11/18 03:31


 


Pulse  98 H  09/11/18 08:30


 


Resp  23   09/11/18 08:30


 


BP  92/66   09/11/18 08:30


 


Pulse Ox  98   09/11/18 08:30














- Physical Examination


General: No Apparent Distress


HEENT: Positive: PERRL, Normocephaly, Mucus Membranes Moist


Neck: Positive: neck supple, trachea midline


Neuro: Positive: Grossly Intact


Abdomen: Positive: Soft.  Negative: Tender


Skin: Negative: Wound


Extremities: Present: +2 Edema (BLE)





- Imaging and Cardiology


EKG: report reviewed, image reviewed


Echo: report reviewed (7/9/2018: VICENTE; EF 45-50%, marked RA enlargement, mod RV 

enlargement, mod RV systolic dysfunction, trace MR, mod to severe TR, mild pulm 

HTN with RVSP 38mmHg, mod left to right shunt suggestive of ostium secundum 

atrial septal defect)


Cardiac cath: report reviewed (7/10/2018: LHC & RHC, NO ASD, no O2 sat step-up, 

EF 35%, normal coronaries, mod pulmonary HTN. )





- Allied health notes


Allied health notes reviewed: nursing

## 2018-09-11 NOTE — PROGRESS NOTE
Assessment and Plan








Syncope


Acute on chronic respiratory failure (2nd to CHF, fluid overload)


Orthopnea


Chronic Biventricular CHF with acute exacerbation


NICMOP


Paroxysmal Atrial Fibrillation


H/O Pulmonary HTN


PFO


Hypotension (h/o Hypertension)


Hypokalemia (likely due to diuretic)


Moderate protein calorie malnutrition





- add stress dose steroids re: hypotension


- repeat CXR to evaluate infiltrates and correlate clinically


- continue GI prophylaxis


- continue full anticoagulation for A-fib


- continue supplemental oxygen to keep sats > 90% (HFNC)


- continue BIPAP at  increased settings of 14/10 (Scheduled qhs)


- continue bronchodilators with pulmonary hygiene per RT


- continue gentle diuresis


- continue dopamine per cardiology recommendations


- continue aspiration precautions


- increase Pepcid to bid re: reflux symptoms


- PT/OT as tolerated


- mobility protocol for pressure ulcer prophylaxis


- target MAP > 65mmHg


- continue other care per attending / other consultants





The high probability of a clinically significant, sudden or life-threatening 

deterioration of the [cardiac, respiratory] system(s) required my full and 

direct attention, intervention and personal management. The aggregate critical 

care time was [30] minutes without overlap. Time includes spent on;





[x] Data Review and interpretation 





[x] Patient assessment and monitoring of vital signs 





[x] Documentation 





[x] Medication orders and management











Subjective


Date of service: 09/11/18


Principal diagnosis: Acute on Chronic Hypoxemic Respiratory Failure; AE-CHF; 

CMOP; Hypotension


Interval history: 





Patient is seen today for: Acute on Chronic Hypoxemic Respiratory Failure; AE-

CHF; CMOP; Hypotension; Symptomatic Bradycardia





Seen and examined at bedside; 24hour events reviewed; nursing and respiratory 

care staff consulted; no adverse overnight events reported to me; remains on 

dopamine; remains hypoxemic; denies h/o prior lung disease; tentatively for 

heart cath soon





Objective


 Vital Signs - 12hr











  09/11/18 09/11/18 09/11/18





  00:45 01:00 01:15


 


Temperature   


 


Pulse Rate 107 H 110 H 103 H


 


Respiratory 20 27 H 20





Rate   


 


Blood Pressure 97/73 97/73 95/72


 


O2 Sat by Pulse  97 





Oximetry   














  09/11/18 09/11/18 09/11/18





  01:30 01:45 02:00


 


Temperature   


 


Pulse Rate 102 H 100 H 108 H


 


Respiratory 20 19 22





Rate   


 


Blood Pressure 101/76 94/73 92/66


 


O2 Sat by Pulse 95  96





Oximetry   














  09/11/18 09/11/18 09/11/18





  02:15 02:30 02:45


 


Temperature   


 


Pulse Rate 104 H 110 H 103 H


 


Respiratory 25 H 26 H 22





Rate   


 


Blood Pressure 96/72 90/63 93/71


 


O2 Sat by Pulse   





Oximetry   














  09/11/18 09/11/18 09/11/18





  03:00 03:15 03:30


 


Temperature   


 


Pulse Rate 100 H 98 H 99 H


 


Respiratory 15 17 20





Rate   


 


Blood Pressure 92/66 94/67 97/69


 


O2 Sat by Pulse  97 





Oximetry   














  09/11/18 09/11/18 09/11/18





  03:31 03:45 04:00


 


Temperature 97.8 F  


 


Pulse Rate  101 H 101 H


 


Respiratory  19 15





Rate   


 


Blood Pressure  97/70 97/70


 


O2 Sat by Pulse   96





Oximetry   














  09/11/18 09/11/18 09/11/18





  04:15 04:30 04:45


 


Temperature   


 


Pulse Rate 98 H 99 H 105 H


 


Respiratory 15 17 21





Rate   


 


Blood Pressure 93/66 92/66 95/73


 


O2 Sat by Pulse 97  





Oximetry   














  09/11/18 09/11/18 09/11/18





  05:00 05:15 05:30


 


Temperature   


 


Pulse Rate 98 H 98 H 95 H


 


Respiratory 15 17 15





Rate   


 


Blood Pressure 91/68 92/66 92/67


 


O2 Sat by Pulse  97 





Oximetry   














  09/11/18 09/11/18 09/11/18





  05:45 06:00 06:15


 


Temperature   


 


Pulse Rate 102 H 100 H 102 H


 


Respiratory 17 17 17





Rate   


 


Blood Pressure 95/65 92/67 98/70


 


O2 Sat by Pulse   





Oximetry   














  09/11/18 09/11/18 09/11/18





  06:30 06:45 07:00


 


Temperature   


 


Pulse Rate 101 H 100 H 97 H


 


Respiratory 18 18 19





Rate   


 


Blood Pressure 90/69 94/71 95/68


 


O2 Sat by Pulse   





Oximetry   














  09/11/18 09/11/18 09/11/18





  07:15 07:30 07:45


 


Temperature   


 


Pulse Rate 101 H 102 H 103 H


 


Respiratory 21 21 19





Rate   


 


Blood Pressure 93/67 96/66 95/61


 


O2 Sat by Pulse   





Oximetry   














  09/11/18 09/11/18 09/11/18





  08:00 08:15 08:30


 


Temperature   


 


Pulse Rate 103 H 104 H 98 H


 


Respiratory 21 25 H 23





Rate   


 


Blood Pressure 89/62 89/63 92/66


 


O2 Sat by Pulse 98  98





Oximetry   














  09/11/18 09/11/18 09/11/18





  08:45 09:00 09:15


 


Temperature   


 


Pulse Rate 100 H 107 H 107 H


 


Respiratory 19 25 H 24





Rate   


 


Blood Pressure 90/64 92/65 89/67


 


O2 Sat by Pulse 99 99 





Oximetry   














  09/11/18 09/11/18 09/11/18





  09:30 09:45 10:00


 


Temperature   


 


Pulse Rate 104 H 120 H 103 H


 


Respiratory 22 29 H 21





Rate   


 


Blood Pressure 86/64 93/68 86/65


 


O2 Sat by Pulse  77 L 





Oximetry   














  09/11/18 09/11/18 09/11/18





  10:15 10:30 10:45


 


Temperature   


 


Pulse Rate 107 H 106 H 103 H


 


Respiratory 22 15 19





Rate   


 


Blood Pressure 89/52 92/62 85/65


 


O2 Sat by Pulse 92  96





Oximetry   














  09/11/18 09/11/18 09/11/18





  11:00 11:15 11:30


 


Temperature   


 


Pulse Rate 102 H 103 H 107 H


 


Respiratory 20 21 17





Rate   


 


Blood Pressure 90/65 87/64 89/67


 


O2 Sat by Pulse   





Oximetry   














  09/11/18





  11:45


 


Temperature 


 


Pulse Rate 105 H


 


Respiratory 16





Rate 


 


Blood Pressure 95/60


 


O2 Sat by Pulse 95





Oximetry 











Constitutional: alert, appears uncomfortable, other (elderly looking AAF, 

normocephalic and atraumatic with increased respiratory effort)


Eyes: non-icteric


ENT: oropharynx moist, other ([place] 3)


Neck: supple, no lymphadenopathy, other (No thyromegaly)


Effort: mildly labored


Ascultation: Bilateral: diminished breath sounds, rales (inspiratory in bases)


Percussion: Bilateral: not dull


Cardiovascular: irregular rhythm, other (No R/M)


Gastrointestinal: normoactive bowel sounds, soft, non-tender, non-distended, 

other (No HSM)


Integumentary: rash


Extremities: no cyanosis, pulses normal, no ischemia or petechiae, edema (2++)


Neurologic: normal mental status, non-focal exam, pupils equal and round, motor 

strength normal and


Psychiatric: mood appropriate, depressed (affect)


CBC and BMP: 


 09/12/18 07:00





 09/12/18 07:00


ABG, PT/INR, D-dimer: 


ABG











POC ABG pH  7.399  (7.35-7.45)   09/10/18  14:16    


 


POC ABG pCO2  38.9  (35-45)   09/10/18  14:16    


 


POC ABG pO2  63  ()  L  09/10/18  14:16    


 


POC ABG HCO3  24.1   09/10/18  14:16    


 


POC ABG Total CO2  25   09/10/18  14:16    


 


POC ABG O2 Sat  92   09/10/18  14:16    





PT/INR, D-dimer











PT  16.7 Sec. (12.2-14.9)  H  08/31/18  23:23    


 


INR  1.30  (0.87-1.13)  H  08/31/18  23:23    


 


D-Dimer  1149.50 ng/mlDDU (0-234)  H  08/31/18  23:23    








Abnormal lab findings: 


 Abnormal Labs











  08/31/18 08/31/18 08/31/18





  23:23 23:23 23:23


 


WBC   


 


RBC   


 


Hgb   14.9 H 


 


Hct   46.0 H 


 


RDW   18.0 H 


 


Lymph % (Auto)   


 


Mono % (Auto)   


 


Baso % (Auto)   


 


Lymph #   


 


Seg Neutrophils %   


 


Seg Neutrophils #   


 


PT    16.7 H


 


INR    1.30 H


 


D-Dimer    1149.50 H


 


POC ABG pO2   


 


Sodium  136 L  


 


Potassium   


 


Chloride  94.8 L  


 


Carbon Dioxide   


 


BUN  36 H  


 


Glucose  192 H  


 


POC Glucose   


 


Total Bilirubin   


 


Direct Bilirubin   


 


CK-MB (CK-2)   


 


CK-MB (CK-2) Rel Index   


 


NT-Pro-B Natriuret Pep  4255 H  


 


Albumin   


 


LDL Cholesterol Direct   














  08/31/18 09/01/18 09/02/18





  23:23 02:59 08:00


 


WBC   


 


RBC   


 


Hgb   


 


Hct   


 


RDW   


 


Lymph % (Auto)   


 


Mono % (Auto)   


 


Baso % (Auto)   


 


Lymph #   


 


Seg Neutrophils %   


 


Seg Neutrophils #   


 


PT   


 


INR   


 


D-Dimer   


 


POC ABG pO2   


 


Sodium    136 L


 


Potassium   


 


Chloride   


 


Carbon Dioxide   


 


BUN    34 H


 


Glucose    105 H


 


POC Glucose   186 H 


 


Total Bilirubin  1.80 H  


 


Direct Bilirubin  0.6 H  


 


CK-MB (CK-2)   


 


CK-MB (CK-2) Rel Index   


 


NT-Pro-B Natriuret Pep   


 


Albumin  3.6 L  


 


LDL Cholesterol Direct   














  09/03/18 09/03/18 09/04/18





  07:36 07:36 08:02


 


WBC  3.7 L   3.1 L


 


RBC   


 


Hgb  14.6 H  


 


Hct  43.5 H  


 


RDW  17.3 H   17.7 H


 


Lymph % (Auto)  39.9 H   42.9 H


 


Mono % (Auto)  9.0 H   8.3 H


 


Baso % (Auto)   


 


Lymph #   


 


Seg Neutrophils %   


 


Seg Neutrophils #  1.7 L   1.4 L


 


PT   


 


INR   


 


D-Dimer   


 


POC ABG pO2   


 


Sodium   135 L 


 


Potassium   


 


Chloride   


 


Carbon Dioxide   21 L 


 


BUN   33 H 


 


Glucose   


 


POC Glucose   


 


Total Bilirubin   


 


Direct Bilirubin   


 


CK-MB (CK-2)   


 


CK-MB (CK-2) Rel Index   


 


NT-Pro-B Natriuret Pep   


 


Albumin   


 


LDL Cholesterol Direct   














  09/04/18 09/04/18 09/05/18





  08:02 08:02 05:57


 


WBC   


 


RBC   


 


Hgb   


 


Hct   


 


RDW   


 


Lymph % (Auto)   


 


Mono % (Auto)   


 


Baso % (Auto)   


 


Lymph #   


 


Seg Neutrophils %   


 


Seg Neutrophils #   


 


PT   


 


INR   


 


D-Dimer   


 


POC ABG pO2   


 


Sodium    133 L


 


Potassium   


 


Chloride    97.4 L


 


Carbon Dioxide  21 L   17 L


 


BUN  32 H   30 H


 


Glucose    117 H


 


POC Glucose   


 


Total Bilirubin  1.30 H  


 


Direct Bilirubin   


 


CK-MB (CK-2)   


 


CK-MB (CK-2) Rel Index   


 


NT-Pro-B Natriuret Pep   


 


Albumin  3.0 L  


 


LDL Cholesterol Direct   42 L 














  09/05/18 09/05/18 09/05/18





  05:57 17:05 17:05


 


WBC  4.0 L  


 


RBC   


 


Hgb   


 


Hct   


 


RDW  17.8 H  


 


Lymph % (Auto)   


 


Mono % (Auto)  7.4 H  


 


Baso % (Auto)   


 


Lymph #  0.7 L  


 


Seg Neutrophils %  73.2 H  


 


Seg Neutrophils #   


 


PT   


 


INR   


 


D-Dimer   


 


POC ABG pO2   


 


Sodium   


 


Potassium   


 


Chloride   


 


Carbon Dioxide   


 


BUN   


 


Glucose   


 


POC Glucose   


 


Total Bilirubin   


 


Direct Bilirubin   


 


CK-MB (CK-2)    4.8 H


 


CK-MB (CK-2) Rel Index    8.1 H


 


NT-Pro-B Natriuret Pep   3883 H 


 


Albumin   


 


LDL Cholesterol Direct   














  09/06/18 09/06/18 09/06/18





  05:15 05:15 08:28


 


WBC   


 


RBC   


 


Hgb   


 


Hct   


 


RDW  16.9 H  


 


Lymph % (Auto)   


 


Mono % (Auto)   


 


Baso % (Auto)   


 


Lymph #  1.0 L  


 


Seg Neutrophils %  70.4 H  


 


Seg Neutrophils #   


 


PT   


 


INR   


 


D-Dimer   


 


POC ABG pO2    65 L


 


Sodium   


 


Potassium   3.4 L D 


 


Chloride   


 


Carbon Dioxide   


 


BUN   25 H 


 


Glucose   122 H 


 


POC Glucose   


 


Total Bilirubin   


 


Direct Bilirubin   


 


CK-MB (CK-2)   


 


CK-MB (CK-2) Rel Index   


 


NT-Pro-B Natriuret Pep   


 


Albumin   


 


LDL Cholesterol Direct   














  09/07/18 09/07/18 09/07/18





  04:37 04:37 14:13


 


WBC   


 


RBC   


 


Hgb   


 


Hct   


 


RDW   17.4 H 


 


Lymph % (Auto)   


 


Mono % (Auto)   


 


Baso % (Auto)   


 


Lymph #   1.0 L 


 


Seg Neutrophils %   70.7 H 


 


Seg Neutrophils #   


 


PT   


 


INR   


 


D-Dimer   


 


POC ABG pO2    62 L


 


Sodium   


 


Potassium   


 


Chloride   


 


Carbon Dioxide   


 


BUN  26 H  


 


Glucose  137 H  


 


POC Glucose   


 


Total Bilirubin  2.10 H  


 


Direct Bilirubin  1.0 H  


 


CK-MB (CK-2)   


 


CK-MB (CK-2) Rel Index   


 


NT-Pro-B Natriuret Pep   


 


Albumin  3.1 L  


 


LDL Cholesterol Direct   














  09/08/18 09/08/18 09/09/18





  04:20 04:20 05:15


 


WBC  4.1 L  


 


RBC   


 


Hgb   


 


Hct  44.0 H  


 


RDW  18.1 H  


 


Lymph % (Auto)   


 


Mono % (Auto)   


 


Baso % (Auto)  2.7 H  


 


Lymph #   


 


Seg Neutrophils %   


 


Seg Neutrophils #   


 


PT   


 


INR   


 


D-Dimer   


 


POC ABG pO2   


 


Sodium   136 L 


 


Potassium   


 


Chloride   97.8 L  97.8 L


 


Carbon Dioxide   


 


BUN   29 H  31 H


 


Glucose   113 H  116 H


 


POC Glucose   


 


Total Bilirubin   


 


Direct Bilirubin   


 


CK-MB (CK-2)   


 


CK-MB (CK-2) Rel Index   


 


NT-Pro-B Natriuret Pep   


 


Albumin   


 


LDL Cholesterol Direct   














  09/10/18 09/10/18 09/10/18





  05:00 05:00 14:16


 


WBC  3.8 L  


 


RBC  5.22 H  


 


Hgb  15.7 H  


 


Hct  48.0 H  


 


RDW  17.8 H  


 


Lymph % (Auto)  35.4 H  


 


Mono % (Auto)   


 


Baso % (Auto)   


 


Lymph #   


 


Seg Neutrophils %   


 


Seg Neutrophils #   


 


PT   


 


INR   


 


D-Dimer   


 


POC ABG pO2    63 L


 


Sodium   135 L 


 


Potassium   


 


Chloride   


 


Carbon Dioxide   


 


BUN   31 H 


 


Glucose   120 H 


 


POC Glucose   


 


Total Bilirubin   


 


Direct Bilirubin   


 


CK-MB (CK-2)   


 


CK-MB (CK-2) Rel Index   


 


NT-Pro-B Natriuret Pep   


 


Albumin   


 


LDL Cholesterol Direct   











Allied health notes reviewed: nursing

## 2018-09-11 NOTE — PROGRESS NOTE
Assessment and Plan


Assessment and plan: 


--Acute on chronic systolic congestive heart failure; with severe lower 

extremity edema


Ejection fraction 45-50%, oxygen , elevated the limbs, low-sodium diet, fluid 

restriction


beta blockers, ace inhibitors are on hold in view of hypotension, Lasix as 

tolerated .


Cardiology planning a right heart catheterization tomorrow for pulmonary 

attention


and hemodynamic status





--Acute on chronic respiratory failure; patient is stable on nasal cannula 

oxygen


 BiPAP as needed , evaluate for home oxygen at discharge





--Hypotension; continue midodrine and dopamine ,blood pressures are still in 

the lower range, 


Beta blockers and ace inhibitors are on hold, Lasix as needed, cardiology 

following





--New onset paroxysmal A. fib with rapid ventricular rate; now rate controlled


Continue anticoagulation with full dose Lovenox,transition to eliquis when pt 

is stable





--Mild malnutrition; supportive care and nutrition supplements





--DVT prophylaxis; Lovenox





--Full CODE STATUS





None of care reviewed with the patient and the family member at the bedside


As well as her nurse and case management


Critical care time 32 minutes











History


Interval history: 


Patient seen and evaluated medical records reviewed


Patient complains of generalized tiredness, remains hypotensive on dopamine


Alert awake in mild distress


Cardiology planning right heart catheterization tomorrow


To evaluate pulmonary pressures and hemodynamic status


Vital signs reviewed








Hospitalist Physical





- Constitutional


Vitals: 


 











Temp Pulse Resp BP Pulse Ox


 


 97.3 F L  105 H  16   95/60   95 


 


 09/11/18 12:00  09/11/18 11:45  09/11/18 11:45  09/11/18 11:45  09/11/18 11:45











General appearance: Present: mild distress, well-nourished, other (nasal 

cannula oxygen)





- EENT


Eyes: Present: PERRL, EOM intact





- Neck


Neck: Present: supple, normal ROM





- Respiratory


Respiratory effort: normal


Respiratory: bilateral: diminished, rales, negative: rhonchi, wheezing





- Cardiovascular


Rhythm: regular


Heart Sounds: Present: S1 & S2





- Extremities


Extremities: no ischemia


Extremity abnormal: edema





- Abdominal


General gastrointestinal: soft, non-tender, non-distended, normal bowel sounds





- Integumentary


Integumentary: Present: clear, warm





- Psychiatric


Psychiatric: appropriate mood/affect, cooperative





- Neurologic


Neurologic: CNII-XII intact, moves all extremities





Results





- Labs


CBC & Chem 7: 


 09/10/18 05:00





 09/10/18 05:00


Labs: 


 Laboratory Last Values











WBC  3.8 K/mm3 (4.5-11.0)  L  09/10/18  05:00    


 


RBC  5.22 M/mm3 (3.65-5.03)  H  09/10/18  05:00    


 


Hgb  15.7 gm/dl (10.1-14.3)  H  09/10/18  05:00    


 


Hct  48.0 % (30.3-42.9)  H  09/10/18  05:00    


 


MCV  92 fl (79-97)   09/10/18  05:00    


 


MCH  30 pg (28-32)   09/10/18  05:00    


 


MCHC  33 % (30-34)   09/10/18  05:00    


 


RDW  17.8 % (13.2-15.2)  H  09/10/18  05:00    


 


Plt Count  264 K/mm3 (140-440)   09/10/18  05:00    


 


Lymph % (Auto)  35.4 % (13.4-35.0)  H  09/10/18  05:00    


 


Mono % (Auto)  6.0 % (0.0-7.3)   09/10/18  05:00    


 


Eos % (Auto)  1.7 % (0.0-4.3)   09/10/18  05:00    


 


Baso % (Auto)  1.5 % (0.0-1.8)   09/10/18  05:00    


 


Lymph #  1.3 K/mm3 (1.2-5.4)   09/10/18  05:00    


 


Mono #  0.2 K/mm3 (0.0-0.8)   09/10/18  05:00    


 


Eos #  0.1 K/mm3 (0.0-0.4)   09/10/18  05:00    


 


Baso #  0.1 K/mm3 (0.0-0.1)   09/10/18  05:00    


 


Seg Neutrophils %  55.4 % (40.0-70.0)   09/10/18  05:00    


 


Seg Neutrophils #  2.1 K/mm3 (1.8-7.7)   09/10/18  05:00    


 


PT  16.7 Sec. (12.2-14.9)  H  08/31/18  23:23    


 


INR  1.30  (0.87-1.13)  H  08/31/18  23:23    


 


APTT  29.2 Sec. (24.2-36.6)   08/31/18  23:23    


 


D-Dimer  1149.50 ng/mlDDU (0-234)  H  08/31/18  23:23    


 


POC ABG pH  7.399  (7.35-7.45)   09/10/18  14:16    


 


POC ABG pCO2  38.9  (35-45)   09/10/18  14:16    


 


POC ABG pO2  63  ()  L  09/10/18  14:16    


 


POC ABG HCO3  24.1   09/10/18  14:16    


 


POC ABG Total CO2  25   09/10/18  14:16    


 


POC ABG O2 Sat  92   09/10/18  14:16    


 


POC ABG Base Excess  -1   09/10/18  14:16    


 


FiO2  45 %  09/10/18  14:16    


 


Sodium  135 mmol/L (137-145)  L  09/10/18  05:00    


 


Potassium  4.0 mmol/L (3.6-5.0)   09/10/18  05:00    


 


Chloride  98.0 mmol/L ()   09/10/18  05:00    


 


Carbon Dioxide  24 mmol/L (22-30)   09/10/18  05:00    


 


Anion Gap  17 mmol/L  09/10/18  05:00    


 


BUN  31 mg/dL (7-17)  H  09/10/18  05:00    


 


Creatinine  0.9 mg/dL (0.7-1.2)   09/10/18  05:00    


 


Estimated GFR  > 60 ml/min  09/10/18  05:00    


 


BUN/Creatinine Ratio  34 %  09/10/18  05:00    


 


Glucose  120 mg/dL ()  H  09/10/18  05:00    


 


POC Glucose  186  ()  H  09/01/18  02:59    


 


Calcium  9.0 mg/dL (8.4-10.2)   09/10/18  05:00    


 


Phosphorus  3.00 mg/dL (2.5-4.5)   09/05/18  05:57    


 


Magnesium  1.90 mg/dL (1.7-2.3)   09/10/18  05:00    


 


Total Bilirubin  2.10 mg/dL (0.1-1.2)  H  09/07/18  04:37    


 


Direct Bilirubin  1.0 mg/dL (0-0.2)  H  09/07/18  04:37    


 


Indirect Bilirubin  1.1 mg/dL  09/07/18  04:37    


 


AST  18 units/L (5-40)   09/07/18  04:37    


 


ALT  23 units/L (7-56)   09/07/18  04:37    


 


Alkaline Phosphatase  111 units/L ()   09/07/18  04:37    


 


Total Creatine Kinase  59 units/L ()   09/05/18  17:05    


 


CK-MB (CK-2)  4.8 ng/mL (0.0-4.0)  H  09/05/18  17:05    


 


CK-MB (CK-2) Rel Index  8.1  (0-4)  H  09/05/18  17:05    


 


Troponin T  < 0.010 ng/mL (0.00-0.029)   09/05/18  17:05    


 


NT-Pro-B Natriuret Pep  3883 pg/mL (0-900)  H  09/05/18  17:05    


 


Total Protein  6.8 g/dL (6.3-8.2)   09/07/18  04:37    


 


Albumin  3.1 g/dL (3.9-5)  L  09/07/18  04:37    


 


Albumin/Globulin Ratio  0.8 %  09/07/18  04:37    


 


Triglycerides  54 mg/dL (2-149)   09/04/18  08:02    


 


Cholesterol  95 mg/dL ()   09/04/18  08:02    


 


LDL Cholesterol Direct  42 mg/dL ()  L  09/04/18  08:02    


 


HDL Cholesterol  49 mg/dL (40-59)   09/04/18  08:02    


 


Cholesterol/HDL Ratio  1.93 %  09/04/18  08:02

## 2018-09-12 LAB
BASOPHILS # (AUTO): 0 K/MM3 (ref 0–0.1)
BASOPHILS NFR BLD AUTO: 0.7 % (ref 0–1.8)
BUN SERPL-MCNC: 33 MG/DL (ref 7–17)
BUN/CREAT SERPL: 30 %
CALCIUM SERPL-MCNC: 8.9 MG/DL (ref 8.4–10.2)
EOSINOPHIL # BLD AUTO: 0 K/MM3 (ref 0–0.4)
EOSINOPHIL NFR BLD AUTO: 0 % (ref 0–4.3)
HCT VFR BLD CALC: 52.4 % (ref 30.3–42.9)
HEMOLYSIS INDEX: 15
HGB BLD-MCNC: 17 GM/DL (ref 10.1–14.3)
INR PPP: 1.44 (ref 0.87–1.13)
LYMPHOCYTES # BLD AUTO: 0.8 K/MM3 (ref 1.2–5.4)
LYMPHOCYTES NFR BLD AUTO: 23 % (ref 13.4–35)
MCH RBC QN AUTO: 30 PG (ref 28–32)
MCHC RBC AUTO-ENTMCNC: 33 % (ref 30–34)
MCV RBC AUTO: 91 FL (ref 79–97)
MONOCYTES # (AUTO): 0.1 K/MM3 (ref 0–0.8)
MONOCYTES % (AUTO): 4.1 % (ref 0–7.3)
PLATELET # BLD: 254 K/MM3 (ref 140–440)
RBC # BLD AUTO: 5.77 M/MM3 (ref 3.65–5.03)

## 2018-09-12 PROCEDURE — 4A023N6 MEASUREMENT OF CARDIAC SAMPLING AND PRESSURE, RIGHT HEART, PERCUTANEOUS APPROACH: ICD-10-PCS | Performed by: INTERNAL MEDICINE

## 2018-09-12 RX ADMIN — MIDODRINE HYDROCHLORIDE SCH: 5 TABLET ORAL at 12:00

## 2018-09-12 RX ADMIN — ASPIRIN SCH: 81 TABLET, COATED ORAL at 10:00

## 2018-09-12 RX ADMIN — FUROSEMIDE SCH: 20 TABLET ORAL at 10:00

## 2018-09-12 RX ADMIN — FAMOTIDINE SCH: 20 TABLET ORAL at 10:00

## 2018-09-12 RX ADMIN — ENOXAPARIN SODIUM SCH MG: 100 INJECTION SUBCUTANEOUS at 22:01

## 2018-09-12 RX ADMIN — MIDODRINE HYDROCHLORIDE SCH MG: 5 TABLET ORAL at 20:00

## 2018-09-12 RX ADMIN — DOPAMINE HYDROCHLORIDE IN DEXTROSE SCH MLS/HR: 3.2 INJECTION, SOLUTION INTRAVENOUS at 19:24

## 2018-09-12 RX ADMIN — FUROSEMIDE SCH MG: 10 INJECTION, SOLUTION INTRAVENOUS at 15:39

## 2018-09-12 RX ADMIN — ACETAMINOPHEN PRN MG: 325 TABLET ORAL at 15:05

## 2018-09-12 NOTE — PROGRESS NOTE
Assessment and Plan


Assessment and plan: 


--Acute on chronic systolic congestive heart failure; with severe lower 

extremity edema


Ejection fraction 45-50%, continue current management





--Acute on chronic respiratory failure; patient is stable on nasal cannula 

oxygen


 BiPAP as needed , evaluate for home oxygen at discharge





--Hypotension; continue midodrine and dopamine ,blood pressures are still in 

the lower range, 


Beta blockers and ace inhibitors are on hold, Lasix as needed, cardiology 

following





--New onset paroxysmal A. fib with rapid ventricular rate; now rate controlled


Continue anticoagulation with full dose Lovenox,transition to eliquis when pt 

is stable





--Mild malnutrition; supportive care and nutrition supplements





--DVT prophylaxis; Lovenox





--Full CODE STATUS





Plan of care reviewed with the patient and the family member at the bedside


Critical care time 32 minutes





History


Interval history: 


Patient seen and examined medical records reviewed


Underwent right heart catheterization this morning, CT pulmonary hypertension


Complaints of generalized weakness


Remains hypotensive on dopamine


Vital signs reviewed











Hospitalist Physical





- Constitutional


Vitals: 


 











Temp Pulse Resp BP Pulse Ox


 


 96 F L  93 H  22   66/29   89 


 


 09/12/18 16:00  09/12/18 18:00  09/12/18 18:00  09/12/18 18:00  09/12/18 18:00











General appearance: Present: mild distress, well-nourished, other (nasal 

cannula oxygen)





- EENT


Eyes: Present: PERRL, EOM intact





- Neck


Neck: Present: supple, normal ROM





- Respiratory


Respiratory effort: normal


Respiratory: bilateral: diminished, rales, negative: rhonchi, wheezing





- Cardiovascular


Rhythm: regular


Heart Sounds: Present: S1 & S2





- Extremities


Extremities: no ischemia


Extremity abnormal: edema





- Abdominal


General gastrointestinal: soft, non-tender, non-distended, normal bowel sounds





- Integumentary


Integumentary: Present: clear, warm





- Psychiatric


Psychiatric: appropriate mood/affect, cooperative





- Neurologic


Neurologic: CNII-XII intact, moves all extremities





Results





- Labs


CBC & Chem 7: 


 09/12/18 07:00





 09/12/18 07:00


Labs: 


 Laboratory Last Values











WBC  3.5 K/mm3 (4.5-11.0)  L  09/12/18  07:00    


 


RBC  5.77 M/mm3 (3.65-5.03)  H  09/12/18  07:00    


 


Hgb  17.0 gm/dl (10.1-14.3)  H  09/12/18  07:00    


 


Hct  52.4 % (30.3-42.9)  H  09/12/18  07:00    


 


MCV  91 fl (79-97)   09/12/18  07:00    


 


MCH  30 pg (28-32)   09/12/18  07:00    


 


MCHC  33 % (30-34)   09/12/18  07:00    


 


RDW  17.5 % (13.2-15.2)  H  09/12/18  07:00    


 


Plt Count  254 K/mm3 (140-440)   09/12/18  07:00    


 


Lymph % (Auto)  23.0 % (13.4-35.0)   09/12/18  07:00    


 


Mono % (Auto)  4.1 % (0.0-7.3)   09/12/18  07:00    


 


Eos % (Auto)  0.0 % (0.0-4.3)   09/12/18  07:00    


 


Baso % (Auto)  0.7 % (0.0-1.8)   09/12/18  07:00    


 


Lymph #  0.8 K/mm3 (1.2-5.4)  L  09/12/18  07:00    


 


Mono #  0.1 K/mm3 (0.0-0.8)   09/12/18  07:00    


 


Eos #  0.0 K/mm3 (0.0-0.4)   09/12/18  07:00    


 


Baso #  0.0 K/mm3 (0.0-0.1)   09/12/18  07:00    


 


Seg Neutrophils %  72.2 % (40.0-70.0)  H  09/12/18  07:00    


 


Seg Neutrophils #  2.6 K/mm3 (1.8-7.7)   09/12/18  07:00    


 


PT  18.4 Sec. (12.2-14.9)  H  09/12/18  07:00    


 


INR  1.44  (0.87-1.13)  H  09/12/18  07:00    


 


APTT  29.2 Sec. (24.2-36.6)   08/31/18  23:23    


 


D-Dimer  1149.50 ng/mlDDU (0-234)  H  08/31/18  23:23    


 


POC ABG pH  7.399  (7.35-7.45)   09/10/18  14:16    


 


POC ABG pCO2  38.9  (35-45)   09/10/18  14:16    


 


POC ABG pO2  63  ()  L  09/10/18  14:16    


 


POC ABG HCO3  24.1   09/10/18  14:16    


 


POC ABG Total CO2  25   09/10/18  14:16    


 


POC ABG O2 Sat  92   09/10/18  14:16    


 


POC ABG Base Excess  -1   09/10/18  14:16    


 


FiO2  45 %  09/10/18  14:16    


 


Sodium  134 mmol/L (137-145)  L  09/12/18  07:00    


 


Potassium  4.3 mmol/L (3.6-5.0)   09/12/18  07:00    


 


Chloride  97.5 mmol/L ()  L  09/12/18  07:00    


 


Carbon Dioxide  22 mmol/L (22-30)   09/12/18  07:00    


 


Anion Gap  19 mmol/L  09/12/18  07:00    


 


BUN  33 mg/dL (7-17)  H  09/12/18  07:00    


 


Creatinine  1.1 mg/dL (0.7-1.2)   09/12/18  07:00    


 


Estimated GFR  > 60 ml/min  09/12/18  07:00    


 


BUN/Creatinine Ratio  30 %  09/12/18  07:00    


 


Glucose  145 mg/dL ()  H  09/12/18  07:00    


 


POC Glucose  138  ()  H  09/12/18  18:18    


 


Calcium  8.9 mg/dL (8.4-10.2)   09/12/18  07:00    


 


Phosphorus  3.00 mg/dL (2.5-4.5)   09/05/18  05:57    


 


Magnesium  1.90 mg/dL (1.7-2.3)   09/10/18  05:00    


 


Total Bilirubin  2.10 mg/dL (0.1-1.2)  H  09/07/18  04:37    


 


Direct Bilirubin  1.0 mg/dL (0-0.2)  H  09/07/18  04:37    


 


Indirect Bilirubin  1.1 mg/dL  09/07/18  04:37    


 


AST  18 units/L (5-40)   09/07/18  04:37    


 


ALT  23 units/L (7-56)   09/07/18  04:37    


 


Alkaline Phosphatase  111 units/L ()   09/07/18  04:37    


 


Total Creatine Kinase  59 units/L ()   09/05/18  17:05    


 


CK-MB (CK-2)  4.8 ng/mL (0.0-4.0)  H  09/05/18  17:05    


 


CK-MB (CK-2) Rel Index  8.1  (0-4)  H  09/05/18  17:05    


 


Troponin T  < 0.010 ng/mL (0.00-0.029)   09/05/18  17:05    


 


NT-Pro-B Natriuret Pep  3883 pg/mL (0-900)  H  09/05/18  17:05    


 


Total Protein  6.8 g/dL (6.3-8.2)   09/07/18  04:37    


 


Albumin  3.1 g/dL (3.9-5)  L  09/07/18  04:37    


 


Albumin/Globulin Ratio  0.8 %  09/07/18  04:37    


 


Triglycerides  54 mg/dL (2-149)   09/04/18  08:02    


 


Cholesterol  95 mg/dL ()   09/04/18  08:02    


 


LDL Cholesterol Direct  42 mg/dL ()  L  09/04/18  08:02    


 


HDL Cholesterol  49 mg/dL (40-59)   09/04/18  08:02    


 


Cholesterol/HDL Ratio  1.93 %  09/04/18  08:02

## 2018-09-12 NOTE — CARDIAC CATHERIZATION REPORT
Right heart catheterization with measurements of cardiac outputs performed on

09/12/2018.



A 63-year-old -American female with history of severe right heart failure

and pulmonary hypertension.  She is scheduled for evaluation of right-sided

pressures and pulmonary vascular resistance for further evaluation of her

symptoms.  The patient apparently has moderate to severe tricuspid

regurgitation.  Has normal coronary anatomy in the past.  Informed consent was

obtained.  The patient was brought to the catheterization laboratory.  The

patient is requiring high supplemental oxygen.  Hence, she was evaluated for

moderate sedation, was given careful sedation with IV Versed and fentanyl.  The

patient tolerated it well.  The patient during the procedure was monitored with

EKG hemodynamic monitoring and pulse oximetry and she remained stable with no

clinical deterioration when she is off oxygen.



DESCRIPTION OF PROCEDURE:  The patient was brought to the catheterization

laboratory and prepared with Betadine solution in the right groin.  Right

femoral vein puncture was made after giving local anesthesia and 8-Divehi sheath

was introduced.  A 7-Divehi Madison-Kenzie catheter was advanced using balloon

inflation and fluoroscopy, advanced into the right atrium, right ventricle, and

pulmonary artery required wide placement for getting the pulmonary capillary

wedge pressure.  Measurements were made and at the same time the patient was on

supplemental oxygen 6 liters per minute and pulmonary artery oxygen saturations

and right femoral artery oxygen saturations were obtained.  It is to be noted

right femoral artery puncture was made using micropuncture needle and a 4-Divehi

sheath was introduced for obtaining the samples.



Following findings were noted:  Right atrial mean pressure is 18 mmHg, right

ventricular pressure is 77/16, pulmonary artery pressure is 76/45 with a mean of

54 mmHg.  Pulmonary artery mean wedge pressure was found to be 26 mmHg.  Cardiac

output by Aidee method was found to be 2.34 liters per minute with cardiac index

being 1.25 liters per minute.  Pulmonary vascular resistance was found to be 950

Dynes per second.



It is to be noted O2 saturations were performed without any oxygen

supplementation and right atrial oxygen saturation was 32%, right ventricular

saturation was 32% along with pulmonary artery saturation of 32%.  There is no

step up noted.  Femoral artery saturation was 74%.



FINAL IMPRESSION:  Severe pulmonary hypertension with pulmonary artery pressure

of 76/45 with a mean of 54.  Pulmonary capillary wedge pressure is 26 mmHg. 

Pulmonary vascular resistance is 950 dynes/sec.



The patient tolerated the procedure well.  No untoward complications were noted

from moderate sedation.



The patient's catheter and sheath were removed and good hemostasis was achieved

with manual pressure.  No hematoma noted.  The patient tolerated the procedure

well and the patient was transferred to the room in stable condition.





DD: 09/12/2018 10:55

DT: 09/12/2018 11:29

JOB# [id number]  [id number]

RADHA/SANDRA TIPTON

## 2018-09-12 NOTE — PROGRESS NOTE
Assessment and Plan





Syncope


Acute on chronic respiratory failure (2nd to CHF, fluid overload)


Orthopnea


Chronic Biventricular CHF with acute exacerbation


NICMOP


Paroxysmal Atrial Fibrillation


H/O Pulmonary HTN


PFO


Hypotension (h/o Hypertension)


Hypokalemia (likely due to diuretic)


Moderate protein calorie malnutrition





(CXR not improved and with basilar increased interstitial markings; elevated 

PAP and persistent hypoxemia do make DPLD a real possibility despite the 

negative history taking)








- get HRCT and review


- get MATT, ACE levels +/- ANCA's and re-evaluate


- schedule empiric solumedrol and follow clinicaly


- still need to optimize cardiac function re: high PCWP


- continue GI prophylaxis


- continue full anticoagulation for A-fib


- continue supplemental oxygen to keep sats > 90% (HFNC)


- continue BIPAP at  increased settings of 14/10 (Scheduled qhs)


- continue bronchodilators with pulmonary hygiene per RT


- continue gentle diuresis


- continue dopamine per cardiology recommendations


- continue aspiration precautions


- increase Pepcid to bid re: reflux symptoms


- PT/OT as tolerated


- mobility protocol for pressure ulcer prophylaxis


- target MAP > 65mmHg


- continue other care per attending / other consultants





The high probability of a clinically significant, sudden or life-threatening 

deterioration of the [cardiac, respiratory] system(s) required my full and 

direct attention, intervention and personal management. The aggregate critical 

care time was [45] minutes without overlap. Time includes spent on;





[x] Data Review and interpretation 





[x] Patient assessment and monitoring of vital signs 





[x] Documentation 





[x] Medication orders and management














Subjective


Date of service: 09/12/18


Principal diagnosis: Acute on Chronic Hypoxemic Respiratory Failure; AE-CHF; 

CMOP; Hypotension


Interval history: 





Patient is seen today for: Acute on Chronic Hypoxemic Respiratory Failure; AE-

CHF; CMOP; Hypotension; Symptomatic Bradycardia





Seen and examined at bedside; 24hour events reviewed; nursing and respiratory 

care staff consulted; no adverse overnight events reported to me; remains on 

dopamine; remains hypoxemic; s/p RHCath; Mean PAP is 54 mmHg however wedge is 

26 mmHg; she denies h/o athralgia's, nephrolithiasis, rashes, dyspagia or S&S 

of overt connective tissue disease





Objective


 Vital Signs - 12hr











  09/12/18 09/12/18 09/12/18





  02:00 02:15 02:30


 


Temperature   


 


Pulse Rate 100 H 97 H 109 H


 


Pulse Rate [   





Right Posterior   





Tibial]   


 


Respiratory 16 16 17





Rate   


 


Blood Pressure 102/79 113/83 108/80


 


O2 Sat by Pulse   99





Oximetry   














  09/12/18 09/12/18 09/12/18





  02:45 03:00 03:15


 


Temperature   


 


Pulse Rate 94 H 110 H 99 H


 


Pulse Rate [   





Right Posterior   





Tibial]   


 


Respiratory 17 19 16





Rate   


 


Blood Pressure 107/74 107/74 115/87


 


O2 Sat by Pulse  75 L 





Oximetry   














  09/12/18 09/12/18 09/12/18





  03:30 03:45 04:00


 


Temperature   98.7 F


 


Pulse Rate 94 H 96 H 91 H


 


Pulse Rate [   96 H





Right Posterior   





Tibial]   


 


Respiratory 15 16 17





Rate   


 


Blood Pressure 109/83 120/84 99/75


 


O2 Sat by Pulse   97





Oximetry   














  09/12/18 09/12/18 09/12/18





  04:15 04:30 04:45


 


Temperature   


 


Pulse Rate 97 H 91 H 97 H


 


Pulse Rate [   





Right Posterior   





Tibial]   


 


Respiratory 17 16 15





Rate   


 


Blood Pressure 104/71 104/71 108/77


 


O2 Sat by Pulse   





Oximetry   














  09/12/18 09/12/18 09/12/18





  05:00 05:15 05:30


 


Temperature   


 


Pulse Rate 98 H 106 H 118 H


 


Pulse Rate [   





Right Posterior   





Tibial]   


 


Respiratory 16 17 19





Rate   


 


Blood Pressure 110/79 104/75 101/79


 


O2 Sat by Pulse   75 L





Oximetry   














  09/12/18 09/12/18 09/12/18





  05:45 06:00 06:15


 


Temperature   


 


Pulse Rate 103 H 101 H 100 H


 


Pulse Rate [   





Right Posterior   





Tibial]   


 


Respiratory 14 15 16





Rate   


 


Blood Pressure 100/79 102/74 110/74


 


O2 Sat by Pulse   100





Oximetry   














  09/12/18 09/12/18 09/12/18





  06:30 06:45 07:00


 


Temperature   


 


Pulse Rate 102 H 102 H 110 H


 


Pulse Rate [   





Right Posterior   





Tibial]   


 


Respiratory 16 17 27 H





Rate   


 


Blood Pressure 105/76 104/74 91/66


 


O2 Sat by Pulse   66 L





Oximetry   














  09/12/18 09/12/18 09/12/18





  07:15 07:30 07:45


 


Temperature   


 


Pulse Rate 106 H 102 H 99 H


 


Pulse Rate [   





Right Posterior   





Tibial]   


 


Respiratory 24 20 20





Rate   


 


Blood Pressure 76/55 93/69 97/76


 


O2 Sat by Pulse 99  





Oximetry   














  09/12/18 09/12/18 09/12/18





  08:00 08:15 08:30


 


Temperature   


 


Pulse Rate 106 H 100 H 107 H


 


Pulse Rate [ 104 H  





Right Posterior   





Tibial]   


 


Respiratory 26 H 21 16





Rate   


 


Blood Pressure 97/76 101/77 109/74


 


O2 Sat by Pulse 100  93





Oximetry   














  09/12/18 09/12/18 09/12/18





  11:34 11:45 11:55


 


Temperature   95 F L


 


Pulse Rate 105 H 111 H 


 


Pulse Rate [   





Right Posterior   





Tibial]   


 


Respiratory 19 20 





Rate   


 


Blood Pressure 94/67 100/66 


 


O2 Sat by Pulse   





Oximetry   














  09/12/18 09/12/18 09/12/18





  12:00 12:15 12:30


 


Temperature 95.0 F L  


 


Pulse Rate 109 H 106 H 101 H


 


Pulse Rate [ 111 H  





Right Posterior   





Tibial]   


 


Respiratory 14 22 15





Rate   


 


Blood Pressure 83/63 90/61 86/61


 


O2 Sat by Pulse 93 92 93





Oximetry   














  09/12/18 09/12/18 09/12/18





  12:45 13:00 13:15


 


Temperature   


 


Pulse Rate 104 H 105 H 106 H


 


Pulse Rate [   





Right Posterior   





Tibial]   


 


Respiratory 19 17 18





Rate   


 


Blood Pressure 87/59 87/56 85/59


 


O2 Sat by Pulse 94 93 91





Oximetry   














  09/12/18





  13:30


 


Temperature 


 


Pulse Rate 107 H


 


Pulse Rate [ 





Right Posterior 





Tibial] 


 


Respiratory 20





Rate 


 


Blood Pressure 90/56


 


O2 Sat by Pulse 93





Oximetry 











Constitutional: alert, appears uncomfortable, other (elderly looking AAF, 

normocephalic and atraumatic with increased respiratory effort)


Eyes: non-icteric


ENT: oropharynx moist, other ([place] 3)


Neck: supple, no lymphadenopathy, other (No thyromegaly)


Effort: mildly labored


Ascultation: Bilateral: diminished breath sounds, rales (inspiratory in bases)


Percussion: Bilateral: not dull


Cardiovascular: irregular rhythm, other (No R/M)


Gastrointestinal: normoactive bowel sounds, soft, non-tender, non-distended, 

other (No HSM)


Integumentary: rash


Extremities: no cyanosis, pulses normal, no ischemia or petechiae, edema (2++)


Neurologic: normal mental status, non-focal exam, pupils equal and round, motor 

strength normal and


Psychiatric: mood appropriate, depressed (affect)


CBC and BMP: 


 09/12/18 07:00





 09/12/18 07:00


ABG, PT/INR, D-dimer: 


ABG











POC ABG pH  7.399  (7.35-7.45)   09/10/18  14:16    


 


POC ABG pCO2  38.9  (35-45)   09/10/18  14:16    


 


POC ABG pO2  63  ()  L  09/10/18  14:16    


 


POC ABG HCO3  24.1   09/10/18  14:16    


 


POC ABG Total CO2  25   09/10/18  14:16    


 


POC ABG O2 Sat  92   09/10/18  14:16    





PT/INR, D-dimer











PT  18.4 Sec. (12.2-14.9)  H  09/12/18  07:00    


 


INR  1.44  (0.87-1.13)  H  09/12/18  07:00    


 


D-Dimer  1149.50 ng/mlDDU (0-234)  H  08/31/18  23:23    








Abnormal lab findings: 


 Abnormal Labs











  08/31/18 08/31/18 08/31/18





  23:23 23:23 23:23


 


WBC   


 


RBC   


 


Hgb   14.9 H 


 


Hct   46.0 H 


 


RDW   18.0 H 


 


Lymph % (Auto)   


 


Mono % (Auto)   


 


Baso % (Auto)   


 


Lymph #   


 


Seg Neutrophils %   


 


Seg Neutrophils #   


 


PT    16.7 H


 


INR    1.30 H


 


D-Dimer    1149.50 H


 


POC ABG pO2   


 


Sodium  136 L  


 


Potassium   


 


Chloride  94.8 L  


 


Carbon Dioxide   


 


BUN  36 H  


 


Glucose  192 H  


 


POC Glucose   


 


Total Bilirubin   


 


Direct Bilirubin   


 


CK-MB (CK-2)   


 


CK-MB (CK-2) Rel Index   


 


NT-Pro-B Natriuret Pep  4255 H  


 


Albumin   


 


LDL Cholesterol Direct   














  08/31/18 09/01/18 09/02/18





  23:23 02:59 08:00


 


WBC   


 


RBC   


 


Hgb   


 


Hct   


 


RDW   


 


Lymph % (Auto)   


 


Mono % (Auto)   


 


Baso % (Auto)   


 


Lymph #   


 


Seg Neutrophils %   


 


Seg Neutrophils #   


 


PT   


 


INR   


 


D-Dimer   


 


POC ABG pO2   


 


Sodium    136 L


 


Potassium   


 


Chloride   


 


Carbon Dioxide   


 


BUN    34 H


 


Glucose    105 H


 


POC Glucose   186 H 


 


Total Bilirubin  1.80 H  


 


Direct Bilirubin  0.6 H  


 


CK-MB (CK-2)   


 


CK-MB (CK-2) Rel Index   


 


NT-Pro-B Natriuret Pep   


 


Albumin  3.6 L  


 


LDL Cholesterol Direct   














  09/03/18 09/03/18 09/04/18





  07:36 07:36 08:02


 


WBC  3.7 L   3.1 L


 


RBC   


 


Hgb  14.6 H  


 


Hct  43.5 H  


 


RDW  17.3 H   17.7 H


 


Lymph % (Auto)  39.9 H   42.9 H


 


Mono % (Auto)  9.0 H   8.3 H


 


Baso % (Auto)   


 


Lymph #   


 


Seg Neutrophils %   


 


Seg Neutrophils #  1.7 L   1.4 L


 


PT   


 


INR   


 


D-Dimer   


 


POC ABG pO2   


 


Sodium   135 L 


 


Potassium   


 


Chloride   


 


Carbon Dioxide   21 L 


 


BUN   33 H 


 


Glucose   


 


POC Glucose   


 


Total Bilirubin   


 


Direct Bilirubin   


 


CK-MB (CK-2)   


 


CK-MB (CK-2) Rel Index   


 


NT-Pro-B Natriuret Pep   


 


Albumin   


 


LDL Cholesterol Direct   














  09/04/18 09/04/18 09/05/18





  08:02 08:02 05:57


 


WBC   


 


RBC   


 


Hgb   


 


Hct   


 


RDW   


 


Lymph % (Auto)   


 


Mono % (Auto)   


 


Baso % (Auto)   


 


Lymph #   


 


Seg Neutrophils %   


 


Seg Neutrophils #   


 


PT   


 


INR   


 


D-Dimer   


 


POC ABG pO2   


 


Sodium    133 L


 


Potassium   


 


Chloride    97.4 L


 


Carbon Dioxide  21 L   17 L


 


BUN  32 H   30 H


 


Glucose    117 H


 


POC Glucose   


 


Total Bilirubin  1.30 H  


 


Direct Bilirubin   


 


CK-MB (CK-2)   


 


CK-MB (CK-2) Rel Index   


 


NT-Pro-B Natriuret Pep   


 


Albumin  3.0 L  


 


LDL Cholesterol Direct   42 L 














  09/05/18 09/05/18 09/05/18





  05:57 17:05 17:05


 


WBC  4.0 L  


 


RBC   


 


Hgb   


 


Hct   


 


RDW  17.8 H  


 


Lymph % (Auto)   


 


Mono % (Auto)  7.4 H  


 


Baso % (Auto)   


 


Lymph #  0.7 L  


 


Seg Neutrophils %  73.2 H  


 


Seg Neutrophils #   


 


PT   


 


INR   


 


D-Dimer   


 


POC ABG pO2   


 


Sodium   


 


Potassium   


 


Chloride   


 


Carbon Dioxide   


 


BUN   


 


Glucose   


 


POC Glucose   


 


Total Bilirubin   


 


Direct Bilirubin   


 


CK-MB (CK-2)    4.8 H


 


CK-MB (CK-2) Rel Index    8.1 H


 


NT-Pro-B Natriuret Pep   3883 H 


 


Albumin   


 


LDL Cholesterol Direct   














  09/06/18 09/06/18 09/06/18





  05:15 05:15 08:28


 


WBC   


 


RBC   


 


Hgb   


 


Hct   


 


RDW  16.9 H  


 


Lymph % (Auto)   


 


Mono % (Auto)   


 


Baso % (Auto)   


 


Lymph #  1.0 L  


 


Seg Neutrophils %  70.4 H  


 


Seg Neutrophils #   


 


PT   


 


INR   


 


D-Dimer   


 


POC ABG pO2    65 L


 


Sodium   


 


Potassium   3.4 L D 


 


Chloride   


 


Carbon Dioxide   


 


BUN   25 H 


 


Glucose   122 H 


 


POC Glucose   


 


Total Bilirubin   


 


Direct Bilirubin   


 


CK-MB (CK-2)   


 


CK-MB (CK-2) Rel Index   


 


NT-Pro-B Natriuret Pep   


 


Albumin   


 


LDL Cholesterol Direct   














  09/07/18 09/07/18 09/07/18





  04:37 04:37 14:13


 


WBC   


 


RBC   


 


Hgb   


 


Hct   


 


RDW   17.4 H 


 


Lymph % (Auto)   


 


Mono % (Auto)   


 


Baso % (Auto)   


 


Lymph #   1.0 L 


 


Seg Neutrophils %   70.7 H 


 


Seg Neutrophils #   


 


PT   


 


INR   


 


D-Dimer   


 


POC ABG pO2    62 L


 


Sodium   


 


Potassium   


 


Chloride   


 


Carbon Dioxide   


 


BUN  26 H  


 


Glucose  137 H  


 


POC Glucose   


 


Total Bilirubin  2.10 H  


 


Direct Bilirubin  1.0 H  


 


CK-MB (CK-2)   


 


CK-MB (CK-2) Rel Index   


 


NT-Pro-B Natriuret Pep   


 


Albumin  3.1 L  


 


LDL Cholesterol Direct   














  09/08/18 09/08/18 09/09/18





  04:20 04:20 05:15


 


WBC  4.1 L  


 


RBC   


 


Hgb   


 


Hct  44.0 H  


 


RDW  18.1 H  


 


Lymph % (Auto)   


 


Mono % (Auto)   


 


Baso % (Auto)  2.7 H  


 


Lymph #   


 


Seg Neutrophils %   


 


Seg Neutrophils #   


 


PT   


 


INR   


 


D-Dimer   


 


POC ABG pO2   


 


Sodium   136 L 


 


Potassium   


 


Chloride   97.8 L  97.8 L


 


Carbon Dioxide   


 


BUN   29 H  31 H


 


Glucose   113 H  116 H


 


POC Glucose   


 


Total Bilirubin   


 


Direct Bilirubin   


 


CK-MB (CK-2)   


 


CK-MB (CK-2) Rel Index   


 


NT-Pro-B Natriuret Pep   


 


Albumin   


 


LDL Cholesterol Direct   














  09/10/18 09/10/18 09/10/18





  05:00 05:00 14:16


 


WBC  3.8 L  


 


RBC  5.22 H  


 


Hgb  15.7 H  


 


Hct  48.0 H  


 


RDW  17.8 H  


 


Lymph % (Auto)  35.4 H  


 


Mono % (Auto)   


 


Baso % (Auto)   


 


Lymph #   


 


Seg Neutrophils %   


 


Seg Neutrophils #   


 


PT   


 


INR   


 


D-Dimer   


 


POC ABG pO2    63 L


 


Sodium   135 L 


 


Potassium   


 


Chloride   


 


Carbon Dioxide   


 


BUN   31 H 


 


Glucose   120 H 


 


POC Glucose   


 


Total Bilirubin   


 


Direct Bilirubin   


 


CK-MB (CK-2)   


 


CK-MB (CK-2) Rel Index   


 


NT-Pro-B Natriuret Pep   


 


Albumin   


 


LDL Cholesterol Direct   














  09/11/18 09/11/18 09/12/18





  17:46 21:35 01:34


 


WBC   


 


RBC   


 


Hgb   


 


Hct   


 


RDW   


 


Lymph % (Auto)   


 


Mono % (Auto)   


 


Baso % (Auto)   


 


Lymph #   


 


Seg Neutrophils %   


 


Seg Neutrophils #   


 


PT   


 


INR   


 


D-Dimer   


 


POC ABG pO2   


 


Sodium   


 


Potassium   


 


Chloride   


 


Carbon Dioxide   


 


BUN   


 


Glucose   


 


POC Glucose  117 H  117 H  128 H


 


Total Bilirubin   


 


Direct Bilirubin   


 


CK-MB (CK-2)   


 


CK-MB (CK-2) Rel Index   


 


NT-Pro-B Natriuret Pep   


 


Albumin   


 


LDL Cholesterol Direct   














  09/12/18 09/12/18 09/12/18





  05:25 07:00 07:00


 


WBC   3.5 L 


 


RBC   5.77 H 


 


Hgb   17.0 H 


 


Hct   52.4 H 


 


RDW   17.5 H 


 


Lymph % (Auto)   


 


Mono % (Auto)   


 


Baso % (Auto)   


 


Lymph #   0.8 L 


 


Seg Neutrophils %   72.2 H 


 


Seg Neutrophils #   


 


PT    18.4 H


 


INR    1.44 H


 


D-Dimer   


 


POC ABG pO2   


 


Sodium   


 


Potassium   


 


Chloride   


 


Carbon Dioxide   


 


BUN   


 


Glucose   


 


POC Glucose  137 H  


 


Total Bilirubin   


 


Direct Bilirubin   


 


CK-MB (CK-2)   


 


CK-MB (CK-2) Rel Index   


 


NT-Pro-B Natriuret Pep   


 


Albumin   


 


LDL Cholesterol Direct   














  09/12/18 09/12/18





  07:00 12:18


 


WBC  


 


RBC  


 


Hgb  


 


Hct  


 


RDW  


 


Lymph % (Auto)  


 


Mono % (Auto)  


 


Baso % (Auto)  


 


Lymph #  


 


Seg Neutrophils %  


 


Seg Neutrophils #  


 


PT  


 


INR  


 


D-Dimer  


 


POC ABG pO2  


 


Sodium  134 L 


 


Potassium  


 


Chloride  97.5 L 


 


Carbon Dioxide  


 


BUN  33 H 


 


Glucose  145 H 


 


POC Glucose   117 H


 


Total Bilirubin  


 


Direct Bilirubin  


 


CK-MB (CK-2)  


 


CK-MB (CK-2) Rel Index  


 


NT-Pro-B Natriuret Pep  


 


Albumin  


 


LDL Cholesterol Direct  











Allied health notes reviewed: nursing

## 2018-09-12 NOTE — PROGRESS NOTE
Assessment and Plan


S/p RHC this AM - pt noted to have severe pulmonary HTN with PAP 76/45 with 

mean of 54, PCWP is 26mmHg, CO 2.34, elevated RA pressures c/w volume overload. 

Will resume IV diuresis. 


Wean pressors as tolerated. 


No BB and/or ACEI/ARB at this time in setting of low BPs. 


Pt has had no noted recurrence of paroxysmal atrial fibrillation since 

discontinuation of dobutamine gtt. Will d/c full dosage lovenox and convert to 

DVT prophylaxis. 


Follow pulmonary recs. 





The patient has been seen in conjunction with Dr. Valentin who agrees with the 

assessment and plan of care. 





- Patient Problems


(1) Biventricular congestive heart failure


Current Visit: Yes   Status: Acute   





(2) Nonischemic cardiomyopathy


Current Visit: Yes   Status: Chronic   





(3) Acute respiratory failure


Current Visit: Yes   Status: Acute   





(4) Paroxysmal atrial fibrillation with RVR


Current Visit: Yes   Status: Acute   





(5) Cor pulmonale


Current Visit: Yes   Status: Chronic   





(6) Pulmonary HTN


Current Visit: Yes   Status: Chronic   





(7) Tricuspid regurgitation


Current Visit: Yes   Status: Chronic   





(8) PFO (patent foramen ovale)


Current Visit: Yes   Status: Chronic   





(9) Hypotension


Current Visit: Yes   Status: Chronic   





(10) Hypoxia


Current Visit: Yes   Status: Chronic   





(11) Interstitial lung disease


Current Visit: Yes   Status: Suspected   





Subjective


Date of service: 09/12/18


Principal diagnosis: Acute on Chronic Hypoxemic Respiratory Failure; AE-CHF; 

CMOP; Hypotension


Interval history: 


seen s/p RHC, pt resting in bed, appears comfortable. on HiFlo NC. BLE edema 

persists. Dopamine gtt infusing. daughter at bedside. 





Objective





  Last Vital Signs











Temp  95.0 F L  09/12/18 12:00


 


Pulse  107 H  09/12/18 13:30


 


Resp  20   09/12/18 13:30


 


BP  90/56   09/12/18 13:30


 


Pulse Ox  93   09/12/18 13:30

















- Physical Examination


General: No Apparent Distress


HEENT: Positive: PERRL, Normocephaly, Mucus Membranes Moist


Neck: Positive: neck supple, trachea midline


Cardiac: Positive: Reg Rate and Rhythm, S1/S2


Lungs: Positive: Decreased Breath Sounds


Neuro: Positive: Grossly Intact


Abdomen: Positive: Soft.  Negative: Tender


Skin: Negative: Wound


Extremities: Present: +2 Edema (BLE)





- Labs and Meds


 Coagulation











  09/12/18 Range/Units





  07:00 


 


PT  18.4 H  (12.2-14.9)  Sec.


 


INR  1.44 H  (0.87-1.13)  








 CBC











  09/12/18 Range/Units





  07:00 


 


WBC  3.5 L  (4.5-11.0)  K/mm3


 


RBC  5.77 H  (3.65-5.03)  M/mm3


 


Hgb  17.0 H  (10.1-14.3)  gm/dl


 


Hct  52.4 H  (30.3-42.9)  %


 


Plt Count  254  (140-440)  K/mm3


 


Lymph #  0.8 L  (1.2-5.4)  K/mm3


 


Mono #  0.1  (0.0-0.8)  K/mm3


 


Eos #  0.0  (0.0-0.4)  K/mm3


 


Baso #  0.0  (0.0-0.1)  K/mm3








 Comprehensive Metabolic Panel











  09/12/18 Range/Units





  07:00 


 


Sodium  134 L  (137-145)  mmol/L


 


Potassium  4.3  (3.6-5.0)  mmol/L


 


Chloride  97.5 L  ()  mmol/L


 


Carbon Dioxide  22  (22-30)  mmol/L


 


BUN  33 H  (7-17)  mg/dL


 


Creatinine  1.1  (0.7-1.2)  mg/dL


 


Glucose  145 H  ()  mg/dL


 


Calcium  8.9  (8.4-10.2)  mg/dL














- Imaging and Cardiology


EKG: report reviewed, image reviewed


Echo: report reviewed (7/9/2018: VICENTE; EF 45-50%, marked RA enlargement, mod RV 

enlargement, mod RV systolic dysfunction, trace MR, mod to severe TR, mild pulm 

HTN with RVSP 38mmHg, mod left to right shunt suggestive of ostium secundum 

atrial septal defect)


Cardiac cath: report reviewed (7/10/2018: LHC & RHC, NO ASD, no O2 sat step-up, 

EF 35%, normal coronaries, mod pulmonary HTN. )





- Telemetry


EKG Rhythm: Sinus Rhythm





- Allied health notes


Allied health notes reviewed: nursing

## 2018-09-12 NOTE — XRAY REPORT
AP CHEST:



HISTORY: Pulmonary edema



Heart size is stable and within normal limits. Pulmonary vessels appear 

normal. No evidence for pulmonary edema. There are mild fibrotic 

changes at the lung bases. Trace right pleural effusion. No evidence 

for consolidation or pneumothorax. Right arm PICC remains in the same 

position.



IMPRESSION:

No change.

## 2018-09-13 LAB
BASOPHILS # (AUTO): 0 K/MM3 (ref 0–0.1)
BASOPHILS NFR BLD AUTO: 0.9 % (ref 0–1.8)
BUN SERPL-MCNC: 33 MG/DL (ref 7–17)
BUN/CREAT SERPL: 30 %
CALCIUM SERPL-MCNC: 8.8 MG/DL (ref 8.4–10.2)
EOSINOPHIL # BLD AUTO: 0 K/MM3 (ref 0–0.4)
EOSINOPHIL NFR BLD AUTO: 0 % (ref 0–4.3)
HCT VFR BLD CALC: 46.4 % (ref 30.3–42.9)
HEMOLYSIS INDEX: 11
HGB BLD-MCNC: 15.3 GM/DL (ref 10.1–14.3)
LYMPHOCYTES # BLD AUTO: 0.5 K/MM3 (ref 1.2–5.4)
LYMPHOCYTES NFR BLD AUTO: 12.9 % (ref 13.4–35)
MCH RBC QN AUTO: 30 PG (ref 28–32)
MCHC RBC AUTO-ENTMCNC: 33 % (ref 30–34)
MCV RBC AUTO: 91 FL (ref 79–97)
MONOCYTES # (AUTO): 0.1 K/MM3 (ref 0–0.8)
MONOCYTES % (AUTO): 3 % (ref 0–7.3)
PLATELET # BLD: 209 K/MM3 (ref 140–440)
RBC # BLD AUTO: 5.12 M/MM3 (ref 3.65–5.03)

## 2018-09-13 RX ADMIN — ASPIRIN SCH MG: 81 TABLET, COATED ORAL at 12:13

## 2018-09-13 RX ADMIN — MIDODRINE HYDROCHLORIDE SCH: 5 TABLET ORAL at 09:38

## 2018-09-13 RX ADMIN — FAMOTIDINE SCH: 20 TABLET ORAL at 12:17

## 2018-09-13 RX ADMIN — MIDODRINE HYDROCHLORIDE SCH MG: 5 TABLET ORAL at 04:12

## 2018-09-13 RX ADMIN — ENOXAPARIN SODIUM SCH MG: 100 INJECTION SUBCUTANEOUS at 21:29

## 2018-09-13 RX ADMIN — MIDODRINE HYDROCHLORIDE SCH MG: 5 TABLET ORAL at 12:13

## 2018-09-13 RX ADMIN — FUROSEMIDE SCH MG: 10 INJECTION, SOLUTION INTRAVENOUS at 12:14

## 2018-09-13 RX ADMIN — MIDODRINE HYDROCHLORIDE SCH MG: 5 TABLET ORAL at 20:45

## 2018-09-13 NOTE — PROGRESS NOTE
Assessment and Plan


Syncope


Acute on chronic hypoxemic respiratory failure (2nd to CHF, fluid overload)


Orthopnea


Chronic Biventricular CHF with acute exacerbation


NICMOP


Paroxysmal Atrial Fibrillation


H/O Pulmonary HTN


PFO


Hypotension (h/o Hypertension)


Hypokalemia (likely due to diuretic)


Moderate protein calorie malnutrition





- continue full anticoagulation for A-fib


- continue supplemental oxygen to keep sats > 90%


- conitnue BIPAP , IPAP 14/EPAP6, qhs and prn FIO2 60%, titrate to oxygen 

saturations>90%


- bronchodilators per protocol


-ABG prn


-optimize cardiac medications


-CXR prn


- gentle diuresis


- continue dopamine per cardiology recommendations


- aspiration precautions


-continue  Pepcid to bid 


- PT/OT as tolerated


- mobility protocol for pressure ulcer prophylaxis


- target MAP > 65mmHg


- continue other care per attending / other consultants


-transfer to [place]











Subjective


Date of service: 09/13/18


Principal diagnosis: Acute on Chronic Hypoxemic Respiratory Failure; AE-CHF; 

CMOP; Hypotension


Interval history: 





Patient seen and examined.


Vitals, labs, medications, chart reviewed.


States she feels better. Improving shortness of breath.


No fevers or chills.


daughter at the bedsdie.


Per RN, about to be transferred to [place] for ongoing care.








Objective


 Vital Signs - 12hr











  09/12/18 09/12/18 09/12/18





  22:00 22:15 22:30


 


Temperature   


 


Pulse Rate 105 H 103 H 105 H


 


Respiratory 16 22 17





Rate   


 


Blood Pressure 98/63 95/66 93/70


 


O2 Sat by Pulse  90 91





Oximetry   














  09/12/18 09/12/18 09/12/18





  22:45 23:01 23:15


 


Temperature   


 


Pulse Rate 107 H 118 H 104 H


 


Respiratory 21 27 H 25 H





Rate   


 


Blood Pressure 93/70 86/65 99/73


 


O2 Sat by Pulse 92  





Oximetry   














  09/12/18 09/12/18 09/12/18





  23:30 23:41 23:45


 


Temperature   


 


Pulse Rate 104 H 106 H 117 H


 


Respiratory 23 23 23





Rate   


 


Blood Pressure 96/72 99/73 97/70


 


O2 Sat by Pulse  100 





Oximetry   














  09/13/18 09/13/18 09/13/18





  00:00 00:01 00:02


 


Temperature 97.0 F L  


 


Pulse Rate  117 H 107 H


 


Respiratory  25 H 18





Rate   


 


Blood Pressure  90/63 90/63


 


O2 Sat by Pulse   91





Oximetry   














  09/13/18 09/13/18 09/13/18





  00:15 00:30 00:45


 


Temperature   


 


Pulse Rate 105 H 102 H 113 H


 


Respiratory 21 17 22





Rate   


 


Blood Pressure 99/66 92/67 93/65


 


O2 Sat by Pulse   





Oximetry   














  09/13/18 09/13/18 09/13/18





  01:00 01:15 01:30


 


Temperature   


 


Pulse Rate 102 H 97 H 102 H


 


Respiratory 16 17 17





Rate   


 


Blood Pressure 93/65 101/76 94/68


 


O2 Sat by Pulse   





Oximetry   














  09/13/18 09/13/18 09/13/18





  01:45 02:01 02:15


 


Temperature   


 


Pulse Rate 104 H 107 H 107 H


 


Respiratory 16 25 H 35 H





Rate   


 


Blood Pressure 94/71 97/65 89/66


 


O2 Sat by Pulse   





Oximetry   














  09/13/18 09/13/18 09/13/18





  02:31 02:45 03:00


 


Temperature   


 


Pulse Rate 114 H 98 H 97 H


 


Respiratory 35 H 17 16





Rate   


 


Blood Pressure 89/66 107/80 99/77


 


O2 Sat by Pulse   94





Oximetry   














  09/13/18 09/13/18 09/13/18





  03:15 03:30 03:45


 


Temperature   


 


Pulse Rate 98 H 97 H 141 H


 


Respiratory 16 15 19





Rate   


 


Blood Pressure 103/74 93/73 100/76


 


O2 Sat by Pulse   100





Oximetry   














  09/13/18 09/13/18 09/13/18





  04:00 04:15 04:30


 


Temperature 96.9 F L  


 


Pulse Rate 87 115 H 101 H


 


Respiratory 15 16 6 L





Rate   


 


Blood Pressure 100/66 100/66 74/50


 


O2 Sat by Pulse 92 93 





Oximetry   














  09/13/18 09/13/18 09/13/18





  04:45 05:00 05:15


 


Temperature   


 


Pulse Rate 96 H 87 77


 


Respiratory 17 15 14





Rate   


 


Blood Pressure 76/50 99/75 104/80


 


O2 Sat by Pulse  74 L 81 L





Oximetry   














  09/13/18 09/13/18 09/13/18





  05:30 05:45 06:00


 


Temperature   


 


Pulse Rate 82 87 96 H


 


Respiratory 18 16 15





Rate   


 


Blood Pressure 106/68 109/74 111/84


 


O2 Sat by Pulse   





Oximetry   














  09/13/18 09/13/18 09/13/18





  06:15 06:30 06:45


 


Temperature   


 


Pulse Rate 86 93 H 91 H


 


Respiratory 17 16 15





Rate   


 


Blood Pressure 113/80 94/74 91/59


 


O2 Sat by Pulse   89





Oximetry   














  09/13/18 09/13/18 09/13/18





  07:00 07:15 07:30


 


Temperature   


 


Pulse Rate 88 94 H 98 H


 


Respiratory 12 16 16





Rate   


 


Blood Pressure 84/58 86/64 94/62


 


O2 Sat by Pulse 88 89 90





Oximetry   














  09/13/18 09/13/18 09/13/18





  07:45 08:00 08:15


 


Temperature  96.1 F L 


 


Pulse Rate 96 H 86 93 H


 


Respiratory 15 15 16





Rate   


 


Blood Pressure 91/65 85/64 80/56


 


O2 Sat by Pulse   





Oximetry   











Constitutional: alert, appears uncomfortable, other (elderly looking AAF, 

normocephalic and atraumatic with increased respiratory effort)


Eyes: non-icteric


ENT: oropharynx moist, other ([place] 3)


Neck: supple, no lymphadenopathy, other (No thyromegaly)


Effort: mildly labored


Ascultation: Bilateral: diminished breath sounds, rales (inspiratory in bases)


Percussion: Bilateral: not dull


Cardiovascular: irregular rhythm, other (No R/M)


Gastrointestinal: normoactive bowel sounds, soft, non-tender, non-distended, 

other (No HSM)


Integumentary: rash


Extremities: no cyanosis, pulses normal, no ischemia or petechiae, edema (2++)


Neurologic: normal mental status, non-focal exam, pupils equal and round, motor 

strength normal and


Psychiatric: mood appropriate, depressed (affect)


CBC and BMP: 


 09/13/18 05:00





 09/13/18 05:00


ABG, PT/INR, D-dimer: 


ABG











POC ABG pH  7.399  (7.35-7.45)   09/10/18  14:16    


 


POC ABG pCO2  38.9  (35-45)   09/10/18  14:16    


 


POC ABG pO2  63  ()  L  09/10/18  14:16    


 


POC ABG HCO3  24.1   09/10/18  14:16    


 


POC ABG Total CO2  25   09/10/18  14:16    


 


POC ABG O2 Sat  92   09/10/18  14:16    





PT/INR, D-dimer











PT  18.4 Sec. (12.2-14.9)  H  09/12/18  07:00    


 


INR  1.44  (0.87-1.13)  H  09/12/18  07:00    


 


D-Dimer  1149.50 ng/mlDDU (0-234)  H  08/31/18  23:23    








Abnormal lab findings: 


 Abnormal Labs











  08/31/18 08/31/18 08/31/18





  23:23 23:23 23:23


 


WBC   


 


RBC   


 


Hgb   14.9 H 


 


Hct   46.0 H 


 


RDW   18.0 H 


 


Lymph % (Auto)   


 


Mono % (Auto)   


 


Baso % (Auto)   


 


Lymph #   


 


Seg Neutrophils %   


 


Seg Neutrophils #   


 


PT    16.7 H


 


INR    1.30 H


 


D-Dimer    1149.50 H


 


POC ABG pO2   


 


Sodium  136 L  


 


Potassium   


 


Chloride  94.8 L  


 


Carbon Dioxide   


 


BUN  36 H  


 


Glucose  192 H  


 


POC Glucose   


 


Total Bilirubin   


 


Direct Bilirubin   


 


CK-MB (CK-2)   


 


CK-MB (CK-2) Rel Index   


 


NT-Pro-B Natriuret Pep  4255 H  


 


Albumin   


 


LDL Cholesterol Direct   














  08/31/18 09/01/18 09/02/18





  23:23 02:59 08:00


 


WBC   


 


RBC   


 


Hgb   


 


Hct   


 


RDW   


 


Lymph % (Auto)   


 


Mono % (Auto)   


 


Baso % (Auto)   


 


Lymph #   


 


Seg Neutrophils %   


 


Seg Neutrophils #   


 


PT   


 


INR   


 


D-Dimer   


 


POC ABG pO2   


 


Sodium    136 L


 


Potassium   


 


Chloride   


 


Carbon Dioxide   


 


BUN    34 H


 


Glucose    105 H


 


POC Glucose   186 H 


 


Total Bilirubin  1.80 H  


 


Direct Bilirubin  0.6 H  


 


CK-MB (CK-2)   


 


CK-MB (CK-2) Rel Index   


 


NT-Pro-B Natriuret Pep   


 


Albumin  3.6 L  


 


LDL Cholesterol Direct   














  09/03/18 09/03/18 09/04/18





  07:36 07:36 08:02


 


WBC  3.7 L   3.1 L


 


RBC   


 


Hgb  14.6 H  


 


Hct  43.5 H  


 


RDW  17.3 H   17.7 H


 


Lymph % (Auto)  39.9 H   42.9 H


 


Mono % (Auto)  9.0 H   8.3 H


 


Baso % (Auto)   


 


Lymph #   


 


Seg Neutrophils %   


 


Seg Neutrophils #  1.7 L   1.4 L


 


PT   


 


INR   


 


D-Dimer   


 


POC ABG pO2   


 


Sodium   135 L 


 


Potassium   


 


Chloride   


 


Carbon Dioxide   21 L 


 


BUN   33 H 


 


Glucose   


 


POC Glucose   


 


Total Bilirubin   


 


Direct Bilirubin   


 


CK-MB (CK-2)   


 


CK-MB (CK-2) Rel Index   


 


NT-Pro-B Natriuret Pep   


 


Albumin   


 


LDL Cholesterol Direct   














  09/04/18 09/04/18 09/05/18





  08:02 08:02 05:57


 


WBC   


 


RBC   


 


Hgb   


 


Hct   


 


RDW   


 


Lymph % (Auto)   


 


Mono % (Auto)   


 


Baso % (Auto)   


 


Lymph #   


 


Seg Neutrophils %   


 


Seg Neutrophils #   


 


PT   


 


INR   


 


D-Dimer   


 


POC ABG pO2   


 


Sodium    133 L


 


Potassium   


 


Chloride    97.4 L


 


Carbon Dioxide  21 L   17 L


 


BUN  32 H   30 H


 


Glucose    117 H


 


POC Glucose   


 


Total Bilirubin  1.30 H  


 


Direct Bilirubin   


 


CK-MB (CK-2)   


 


CK-MB (CK-2) Rel Index   


 


NT-Pro-B Natriuret Pep   


 


Albumin  3.0 L  


 


LDL Cholesterol Direct   42 L 














  09/05/18 09/05/18 09/05/18





  05:57 17:05 17:05


 


WBC  4.0 L  


 


RBC   


 


Hgb   


 


Hct   


 


RDW  17.8 H  


 


Lymph % (Auto)   


 


Mono % (Auto)  7.4 H  


 


Baso % (Auto)   


 


Lymph #  0.7 L  


 


Seg Neutrophils %  73.2 H  


 


Seg Neutrophils #   


 


PT   


 


INR   


 


D-Dimer   


 


POC ABG pO2   


 


Sodium   


 


Potassium   


 


Chloride   


 


Carbon Dioxide   


 


BUN   


 


Glucose   


 


POC Glucose   


 


Total Bilirubin   


 


Direct Bilirubin   


 


CK-MB (CK-2)    4.8 H


 


CK-MB (CK-2) Rel Index    8.1 H


 


NT-Pro-B Natriuret Pep   3883 H 


 


Albumin   


 


LDL Cholesterol Direct   














  09/06/18 09/06/18 09/06/18





  05:15 05:15 08:28


 


WBC   


 


RBC   


 


Hgb   


 


Hct   


 


RDW  16.9 H  


 


Lymph % (Auto)   


 


Mono % (Auto)   


 


Baso % (Auto)   


 


Lymph #  1.0 L  


 


Seg Neutrophils %  70.4 H  


 


Seg Neutrophils #   


 


PT   


 


INR   


 


D-Dimer   


 


POC ABG pO2    65 L


 


Sodium   


 


Potassium   3.4 L D 


 


Chloride   


 


Carbon Dioxide   


 


BUN   25 H 


 


Glucose   122 H 


 


POC Glucose   


 


Total Bilirubin   


 


Direct Bilirubin   


 


CK-MB (CK-2)   


 


CK-MB (CK-2) Rel Index   


 


NT-Pro-B Natriuret Pep   


 


Albumin   


 


LDL Cholesterol Direct   














  09/07/18 09/07/18 09/07/18





  04:37 04:37 14:13


 


WBC   


 


RBC   


 


Hgb   


 


Hct   


 


RDW   17.4 H 


 


Lymph % (Auto)   


 


Mono % (Auto)   


 


Baso % (Auto)   


 


Lymph #   1.0 L 


 


Seg Neutrophils %   70.7 H 


 


Seg Neutrophils #   


 


PT   


 


INR   


 


D-Dimer   


 


POC ABG pO2    62 L


 


Sodium   


 


Potassium   


 


Chloride   


 


Carbon Dioxide   


 


BUN  26 H  


 


Glucose  137 H  


 


POC Glucose   


 


Total Bilirubin  2.10 H  


 


Direct Bilirubin  1.0 H  


 


CK-MB (CK-2)   


 


CK-MB (CK-2) Rel Index   


 


NT-Pro-B Natriuret Pep   


 


Albumin  3.1 L  


 


LDL Cholesterol Direct   














  09/08/18 09/08/18 09/09/18





  04:20 04:20 05:15


 


WBC  4.1 L  


 


RBC   


 


Hgb   


 


Hct  44.0 H  


 


RDW  18.1 H  


 


Lymph % (Auto)   


 


Mono % (Auto)   


 


Baso % (Auto)  2.7 H  


 


Lymph #   


 


Seg Neutrophils %   


 


Seg Neutrophils #   


 


PT   


 


INR   


 


D-Dimer   


 


POC ABG pO2   


 


Sodium   136 L 


 


Potassium   


 


Chloride   97.8 L  97.8 L


 


Carbon Dioxide   


 


BUN   29 H  31 H


 


Glucose   113 H  116 H


 


POC Glucose   


 


Total Bilirubin   


 


Direct Bilirubin   


 


CK-MB (CK-2)   


 


CK-MB (CK-2) Rel Index   


 


NT-Pro-B Natriuret Pep   


 


Albumin   


 


LDL Cholesterol Direct   














  09/10/18 09/10/18 09/10/18





  05:00 05:00 14:16


 


WBC  3.8 L  


 


RBC  5.22 H  


 


Hgb  15.7 H  


 


Hct  48.0 H  


 


RDW  17.8 H  


 


Lymph % (Auto)  35.4 H  


 


Mono % (Auto)   


 


Baso % (Auto)   


 


Lymph #   


 


Seg Neutrophils %   


 


Seg Neutrophils #   


 


PT   


 


INR   


 


D-Dimer   


 


POC ABG pO2    63 L


 


Sodium   135 L 


 


Potassium   


 


Chloride   


 


Carbon Dioxide   


 


BUN   31 H 


 


Glucose   120 H 


 


POC Glucose   


 


Total Bilirubin   


 


Direct Bilirubin   


 


CK-MB (CK-2)   


 


CK-MB (CK-2) Rel Index   


 


NT-Pro-B Natriuret Pep   


 


Albumin   


 


LDL Cholesterol Direct   














  09/11/18 09/11/18 09/12/18





  17:46 21:35 01:34


 


WBC   


 


RBC   


 


Hgb   


 


Hct   


 


RDW   


 


Lymph % (Auto)   


 


Mono % (Auto)   


 


Baso % (Auto)   


 


Lymph #   


 


Seg Neutrophils %   


 


Seg Neutrophils #   


 


PT   


 


INR   


 


D-Dimer   


 


POC ABG pO2   


 


Sodium   


 


Potassium   


 


Chloride   


 


Carbon Dioxide   


 


BUN   


 


Glucose   


 


POC Glucose  117 H  117 H  128 H


 


Total Bilirubin   


 


Direct Bilirubin   


 


CK-MB (CK-2)   


 


CK-MB (CK-2) Rel Index   


 


NT-Pro-B Natriuret Pep   


 


Albumin   


 


LDL Cholesterol Direct   














  09/12/18 09/12/18 09/12/18





  05:25 07:00 07:00


 


WBC   3.5 L 


 


RBC   5.77 H 


 


Hgb   17.0 H 


 


Hct   52.4 H 


 


RDW   17.5 H 


 


Lymph % (Auto)   


 


Mono % (Auto)   


 


Baso % (Auto)   


 


Lymph #   0.8 L 


 


Seg Neutrophils %   72.2 H 


 


Seg Neutrophils #   


 


PT    18.4 H


 


INR    1.44 H


 


D-Dimer   


 


POC ABG pO2   


 


Sodium   


 


Potassium   


 


Chloride   


 


Carbon Dioxide   


 


BUN   


 


Glucose   


 


POC Glucose  137 H  


 


Total Bilirubin   


 


Direct Bilirubin   


 


CK-MB (CK-2)   


 


CK-MB (CK-2) Rel Index   


 


NT-Pro-B Natriuret Pep   


 


Albumin   


 


LDL Cholesterol Direct   














  09/12/18 09/12/18 09/12/18





  07:00 12:18 18:18


 


WBC   


 


RBC   


 


Hgb   


 


Hct   


 


RDW   


 


Lymph % (Auto)   


 


Mono % (Auto)   


 


Baso % (Auto)   


 


Lymph #   


 


Seg Neutrophils %   


 


Seg Neutrophils #   


 


PT   


 


INR   


 


D-Dimer   


 


POC ABG pO2   


 


Sodium  134 L  


 


Potassium   


 


Chloride  97.5 L  


 


Carbon Dioxide   


 


BUN  33 H  


 


Glucose  145 H  


 


POC Glucose   117 H  138 H


 


Total Bilirubin   


 


Direct Bilirubin   


 


CK-MB (CK-2)   


 


CK-MB (CK-2) Rel Index   


 


NT-Pro-B Natriuret Pep   


 


Albumin   


 


LDL Cholesterol Direct   














  09/12/18 09/13/18 09/13/18





  21:21 02:02 05:00


 


WBC   


 


RBC   


 


Hgb   


 


Hct   


 


RDW   


 


Lymph % (Auto)   


 


Mono % (Auto)   


 


Baso % (Auto)   


 


Lymph #   


 


Seg Neutrophils %   


 


Seg Neutrophils #   


 


PT   


 


INR   


 


D-Dimer   


 


POC ABG pO2   


 


Sodium    132 L


 


Potassium   


 


Chloride    94.3 L


 


Carbon Dioxide   


 


BUN    33 H


 


Glucose    228 H


 


POC Glucose  146 H  167 H 


 


Total Bilirubin   


 


Direct Bilirubin   


 


CK-MB (CK-2)   


 


CK-MB (CK-2) Rel Index   


 


NT-Pro-B Natriuret Pep   


 


Albumin   


 


LDL Cholesterol Direct   














  09/13/18





  05:00


 


WBC  3.8 L


 


RBC  5.12 H


 


Hgb  15.3 H


 


Hct  46.4 H D


 


RDW  17.7 H


 


Lymph % (Auto)  12.9 L


 


Mono % (Auto) 


 


Baso % (Auto) 


 


Lymph #  0.5 L


 


Seg Neutrophils %  83.2 H


 


Seg Neutrophils # 


 


PT 


 


INR 


 


D-Dimer 


 


POC ABG pO2 


 


Sodium 


 


Potassium 


 


Chloride 


 


Carbon Dioxide 


 


BUN 


 


Glucose 


 


POC Glucose 


 


Total Bilirubin 


 


Direct Bilirubin 


 


CK-MB (CK-2) 


 


CK-MB (CK-2) Rel Index 


 


NT-Pro-B Natriuret Pep 


 


Albumin 


 


LDL Cholesterol Direct 











Allied health notes reviewed: nursing

## 2018-09-13 NOTE — EVENT NOTE
Date: 09/13/18


As per  Ms Wen's note, [facility] has refused 

transfer 


Received a return call from Zabrina Garcia from the [place] transfer team who 

informed that patient had been denied a transfer.

## 2018-09-13 NOTE — CAT SCAN REPORT
CT CHEST WITHOUT CONTRAST:



HISTORY: Interstitial lung disease evaluation, pulmonary edema.



COMPARISON: Multiple recent chest x-rays.



TECHNIQUE: Helical CT in 1.25mm intervals without IV contrast.  

Sagittal and coronal reformatted images.  





FINDINGS:







Thyroid gland: Normal.



Tracheobronchial tree: Normal.



Esophagus: Normal.



Heart: Borderline to mild cardiomegaly is evident.



Pericardium: A small pericardial effusion is identified.



Mediastinum: There are scattered borderline to mildly enlarged lymph 

nodes in the mediastinum. The largest lymph node measures 2.2 x 1.3 cm 

in the precarinal region. No hilar or axillary adenopathy.



Lung Fields: Interstitial markings are prominent in the lower lung 

zones. This appears to be secondary to interstitial edema. No fibrotic 

changes are identified. No evidence for consolidation, suspicious mass 

or advanced interstitial lung disease.



Pleural Spaces: Small right pleural effusion and trace left pleural 

effusion layered posteriorly.



Musculoskeletal: Mild thoracic spondylosis.





IMPRESSION: 

Findings most compatible with mild CHF including mild cardiomegaly, 

mild interstitial edema and small pleural effusions. No significant 

interstitial lung disease is detected.

## 2018-09-13 NOTE — PROGRESS NOTE
Assessment and Plan


Assessment and plan: 


--Hypotension; continue midodrine and dopamine ,blood pressures are still in 

the lower range, 


Beta blockers and ace inhibitors are on hold, Lasix as needed, cardiology 

following





Cardiology was planning to transfer the patient to [facility]


Dr. Derek Miller has agreed to accept the patient, Transverse processes initiated

, awaiting response from [place]





--Acute on chronic systolic congestive heart failure; with severe lower 

extremity edema


Ejection fraction 45-50%, continue current management





--Acute on chronic respiratory failure; patient is stable on nasal cannula 

oxygen


 BiPAP as needed , evaluate for home oxygen at discharge





--New onset paroxysmal A. fib with rapid ventricular rate; now rate controlled


on full dose Lovenox , cardiology changed Lovenox to DVT prophylaxis dose.





--Mild malnutrition; supportive care and nutrition supplements





--DVT prophylaxis; Lovenox





--Full CODE STATUS





Plan of care reviewed with the patient and the family member at the bedside





Follow transfer arrangements to [facility]


Critical care time 32 minutes








History


Interval history: 


Patient is seen and examination this morning, medical records reviewed


Patient feels slightly better, on dopamine, remains hypotensive


No new complaints


Vital signs reviewed








Hospitalist Physical





- Constitutional


Vitals: 


 











Temp Pulse Resp BP Pulse Ox


 


 96.1 F L  93 H  16   80/56   90 


 


 09/13/18 08:00  09/13/18 08:15  09/13/18 08:15  09/13/18 08:15  09/13/18 07:30











General appearance: Present: no acute distress, well-nourished, other (nasal 

cannula oxygen)





- EENT


Eyes: Present: PERRL, EOM intact





- Neck


Neck: Present: supple, normal ROM





- Respiratory


Respiratory effort: normal


Respiratory: bilateral: diminished, rales, negative: rhonchi, wheezing





- Cardiovascular


Rhythm: regular


Heart Sounds: Present: S1 & S2





- Extremities


Extremities: no ischemia


Extremity abnormal: edema





- Abdominal


General gastrointestinal: soft, non-tender, non-distended, normal bowel sounds





- Integumentary


Integumentary: Present: clear, warm





- Psychiatric


Psychiatric: appropriate mood/affect, cooperative





- Neurologic


Neurologic: CNII-XII intact, moves all extremities





Results





- Labs


CBC & Chem 7: 


 09/13/18 05:00





 09/13/18 05:00


Labs: 


 Laboratory Last Values











WBC  3.8 K/mm3 (4.5-11.0)  L  09/13/18  05:00    


 


RBC  5.12 M/mm3 (3.65-5.03)  H  09/13/18  05:00    


 


Hgb  15.3 gm/dl (10.1-14.3)  H  09/13/18  05:00    


 


Hct  46.4 % (30.3-42.9)  H D 09/13/18  05:00    


 


MCV  91 fl (79-97)   09/13/18  05:00    


 


MCH  30 pg (28-32)   09/13/18  05:00    


 


MCHC  33 % (30-34)   09/13/18  05:00    


 


RDW  17.7 % (13.2-15.2)  H  09/13/18  05:00    


 


Plt Count  209 K/mm3 (140-440)   09/13/18  05:00    


 


Lymph % (Auto)  12.9 % (13.4-35.0)  L  09/13/18  05:00    


 


Mono % (Auto)  3.0 % (0.0-7.3)   09/13/18  05:00    


 


Eos % (Auto)  0.0 % (0.0-4.3)   09/13/18  05:00    


 


Baso % (Auto)  0.9 % (0.0-1.8)   09/13/18  05:00    


 


Lymph #  0.5 K/mm3 (1.2-5.4)  L  09/13/18  05:00    


 


Mono #  0.1 K/mm3 (0.0-0.8)   09/13/18  05:00    


 


Eos #  0.0 K/mm3 (0.0-0.4)   09/13/18  05:00    


 


Baso #  0.0 K/mm3 (0.0-0.1)   09/13/18  05:00    


 


Seg Neutrophils %  83.2 % (40.0-70.0)  H  09/13/18  05:00    


 


Seg Neutrophils #  3.2 K/mm3 (1.8-7.7)   09/13/18  05:00    


 


PT  18.4 Sec. (12.2-14.9)  H  09/12/18  07:00    


 


INR  1.44  (0.87-1.13)  H  09/12/18  07:00    


 


APTT  29.2 Sec. (24.2-36.6)   08/31/18  23:23    


 


D-Dimer  1149.50 ng/mlDDU (0-234)  H  08/31/18  23:23    


 


POC ABG pH  7.399  (7.35-7.45)   09/10/18  14:16    


 


POC ABG pCO2  38.9  (35-45)   09/10/18  14:16    


 


POC ABG pO2  63  ()  L  09/10/18  14:16    


 


POC ABG HCO3  24.1   09/10/18  14:16    


 


POC ABG Total CO2  25   09/10/18  14:16    


 


POC ABG O2 Sat  92   09/10/18  14:16    


 


POC ABG Base Excess  -1   09/10/18  14:16    


 


FiO2  45 %  09/10/18  14:16    


 


Sodium  132 mmol/L (137-145)  L  09/13/18  05:00    


 


Potassium  4.2 mmol/L (3.6-5.0)   09/13/18  05:00    


 


Chloride  94.3 mmol/L ()  L  09/13/18  05:00    


 


Carbon Dioxide  22 mmol/L (22-30)   09/13/18  05:00    


 


Anion Gap  20 mmol/L  09/13/18  05:00    


 


BUN  33 mg/dL (7-17)  H  09/13/18  05:00    


 


Creatinine  1.1 mg/dL (0.7-1.2)   09/13/18  05:00    


 


Estimated GFR  > 60 ml/min  09/13/18  05:00    


 


BUN/Creatinine Ratio  30 %  09/13/18  05:00    


 


Glucose  228 mg/dL ()  H  09/13/18  05:00    


 


POC Glucose  181  ()  H  09/13/18  10:33    


 


Calcium  8.8 mg/dL (8.4-10.2)   09/13/18  05:00    


 


Phosphorus  3.00 mg/dL (2.5-4.5)   09/05/18  05:57    


 


Magnesium  2.10 mg/dL (1.7-2.3)   09/13/18  05:00    


 


Total Bilirubin  2.10 mg/dL (0.1-1.2)  H  09/07/18  04:37    


 


Direct Bilirubin  1.0 mg/dL (0-0.2)  H  09/07/18  04:37    


 


Indirect Bilirubin  1.1 mg/dL  09/07/18  04:37    


 


AST  18 units/L (5-40)   09/07/18  04:37    


 


ALT  23 units/L (7-56)   09/07/18  04:37    


 


Alkaline Phosphatase  111 units/L ()   09/07/18  04:37    


 


Total Creatine Kinase  59 units/L ()   09/05/18  17:05    


 


CK-MB (CK-2)  4.8 ng/mL (0.0-4.0)  H  09/05/18  17:05    


 


CK-MB (CK-2) Rel Index  8.1  (0-4)  H  09/05/18  17:05    


 


Troponin T  < 0.010 ng/mL (0.00-0.029)   09/05/18  17:05    


 


NT-Pro-B Natriuret Pep  3883 pg/mL (0-900)  H  09/05/18  17:05    


 


Total Protein  6.8 g/dL (6.3-8.2)   09/07/18  04:37    


 


Albumin  3.1 g/dL (3.9-5)  L  09/07/18  04:37    


 


Albumin/Globulin Ratio  0.8 %  09/07/18  04:37    


 


Triglycerides  54 mg/dL (2-149)   09/04/18  08:02    


 


Cholesterol  95 mg/dL ()   09/04/18  08:02    


 


LDL Cholesterol Direct  42 mg/dL ()  L  09/04/18  08:02    


 


HDL Cholesterol  49 mg/dL (40-59)   09/04/18  08:02    


 


Cholesterol/HDL Ratio  1.93 %  09/04/18  08:02

## 2018-09-13 NOTE — PROGRESS NOTE
Assessment and Plan


Pt to tx to [facility]. Dr. Derek Miller has agreed to accept the 

patient. D/w Dr. Kocherla. 


Cont IV diuresis. 


Wean pressors as tolerated. 


No BB and/or ACEI/ARB at this time in setting of low BPs. 


Follow pulmonary recs. 





The patient has been seen in conjunction with Dr. Valentin who agrees with the 

assessment and plan of care. 





- Patient Problems


(1) Biventricular congestive heart failure


Current Visit: Yes   Status: Acute   





(2) Nonischemic cardiomyopathy


Current Visit: Yes   Status: Chronic   





(3) Acute respiratory failure


Current Visit: Yes   Status: Acute   





(4) Paroxysmal atrial fibrillation with RVR


Current Visit: Yes   Status: Acute   





(5) Cor pulmonale


Current Visit: Yes   Status: Chronic   





(6) Pulmonary HTN


Current Visit: Yes   Status: Chronic   





(7) Tricuspid regurgitation


Current Visit: Yes   Status: Chronic   





(8) PFO (patent foramen ovale)


Current Visit: Yes   Status: Chronic   





(9) Hypotension


Current Visit: Yes   Status: Chronic   





(10) Hypoxia


Current Visit: Yes   Status: Chronic   





(11) Interstitial lung disease


Current Visit: Yes   Status: Suspected   





Subjective


Date of service: 09/13/18


Principal diagnosis: Acute on Chronic Hypoxemic Respiratory Failure; AE-CHF; 

CMOP; Hypotension


Interval history: 


Pt resting in bed, appears comfortable. on O2 via NC. BLE edema improving. 

Dopamine gtt infusing. daughter at bedside - she is requesting transfer to 

[place].  





Objective





  Last Vital Signs











Temp  96.1 F L  09/13/18 08:00


 


Pulse  94 H  09/13/18 12:00


 


Resp  16   09/13/18 12:00


 


BP  105/79   09/13/18 12:00


 


Pulse Ox  70 L  09/13/18 12:00

















- Physical Examination


General: No Apparent Distress


HEENT: Positive: PERRL, Normocephaly, Mucus Membranes Moist


Neck: Positive: neck supple, trachea midline


Cardiac: Positive: Reg Rate and Rhythm, S1/S2


Lungs: Positive: Decreased Breath Sounds


Neuro: Positive: Grossly Intact


Abdomen: Positive: Soft.  Negative: Tender


Skin: Negative: Wound


Extremities: Present: +2 Edema (BLE)





- Labs and Meds


 CBC











  09/13/18 Range/Units





  05:00 


 


WBC  3.8 L  (4.5-11.0)  K/mm3


 


RBC  5.12 H  (3.65-5.03)  M/mm3


 


Hgb  15.3 H  (10.1-14.3)  gm/dl


 


Hct  46.4 H D  (30.3-42.9)  %


 


Plt Count  209  (140-440)  K/mm3


 


Lymph #  0.5 L  (1.2-5.4)  K/mm3


 


Mono #  0.1  (0.0-0.8)  K/mm3


 


Eos #  0.0  (0.0-0.4)  K/mm3


 


Baso #  0.0  (0.0-0.1)  K/mm3








 Comprehensive Metabolic Panel











  09/13/18 Range/Units





  05:00 


 


Sodium  132 L  (137-145)  mmol/L


 


Potassium  4.2  (3.6-5.0)  mmol/L


 


Chloride  94.3 L  ()  mmol/L


 


Carbon Dioxide  22  (22-30)  mmol/L


 


BUN  33 H  (7-17)  mg/dL


 


Creatinine  1.1  (0.7-1.2)  mg/dL


 


Glucose  228 H  ()  mg/dL


 


Calcium  8.8  (8.4-10.2)  mg/dL














- Imaging and Cardiology


EKG: report reviewed, image reviewed


Echo: report reviewed (7/9/2018: VICENTE; EF 45-50%, marked RA enlargement, mod RV 

enlargement, mod RV systolic dysfunction, trace MR, mod to severe TR, mild pulm 

HTN with RVSP 38mmHg, mod left to right shunt suggestive of ostium secundum 

atrial septal defect)


Cardiac cath: report reviewed (7/10/2018: LHC & RHC, NO ASD, no O2 sat step-up, 

EF 35%, normal coronaries, mod pulmonary HTN. )





- Allied health notes


Allied health notes reviewed: nursing

## 2018-09-14 RX ADMIN — DOCUSATE SODIUM SCH MG: 100 CAPSULE, LIQUID FILLED ORAL at 11:48

## 2018-09-14 RX ADMIN — MIDODRINE HYDROCHLORIDE SCH MG: 5 TABLET ORAL at 11:48

## 2018-09-14 RX ADMIN — FAMOTIDINE SCH: 20 TABLET ORAL at 00:39

## 2018-09-14 RX ADMIN — DOCUSATE SODIUM SCH MG: 100 CAPSULE, LIQUID FILLED ORAL at 22:19

## 2018-09-14 RX ADMIN — FUROSEMIDE SCH MG: 10 INJECTION, SOLUTION INTRAVENOUS at 18:38

## 2018-09-14 RX ADMIN — DOPAMINE HYDROCHLORIDE IN DEXTROSE SCH MLS/HR: 3.2 INJECTION, SOLUTION INTRAVENOUS at 15:49

## 2018-09-14 RX ADMIN — ONDANSETRON PRN MG: 2 INJECTION INTRAMUSCULAR; INTRAVENOUS at 15:48

## 2018-09-14 RX ADMIN — ASPIRIN SCH MG: 81 TABLET, COATED ORAL at 11:48

## 2018-09-14 RX ADMIN — MIDODRINE HYDROCHLORIDE SCH MG: 5 TABLET ORAL at 20:33

## 2018-09-14 RX ADMIN — ONDANSETRON PRN MG: 2 INJECTION INTRAMUSCULAR; INTRAVENOUS at 20:29

## 2018-09-14 RX ADMIN — MIDODRINE HYDROCHLORIDE SCH MG: 5 TABLET ORAL at 04:58

## 2018-09-14 RX ADMIN — FAMOTIDINE SCH: 20 TABLET ORAL at 21:41

## 2018-09-14 RX ADMIN — FUROSEMIDE SCH: 10 INJECTION, SOLUTION INTRAVENOUS at 21:41

## 2018-09-14 NOTE — PROGRESS NOTE
Assessment and Plan


Syncope


Acute on chronic hypoxemic respiratory failure (2nd to CHF, fluid overload)


Orthopnea


Chronic Biventricular CHF with acute exacerbation


NICMOP


Paroxysmal Atrial Fibrillation


Pulmonary HTN (moderate)


PFO


Moderate protein calorie malnutrition





Echo: report reviewed (7/9/2018: VICENTE; EF 45-50%, marked RA enlargement, mod RV 

enlargement, mod RV systolic dysfunction, trace MR, mod to severe TR, mild pulm 

HTN with RVSP 38mmHg, mod left to right shunt suggestive of ostium secundum 

atrial septal defect)


Cardiac cath: report reviewed (7/10/2018: LHC & RHC, NO ASD, no O2 sat step-up, 

EF 35%, normal coronaries, mod pulmonary HTN. )





With her pulmonary pressures 76/45, mean 54 and wedge pressure of 26 and a HRCT 

that does not show any evidence of significant pulmonary disease, 


this appears to be pulmonary venous hypertension. Optimize heart failure 

measures--pulmonary vasodilators are contraindicated in pulmonary venous 

hypertension





- continue full anticoagulation for A-fib


- continue supplemental oxygen to keep sats > 90%


- conitnue BIPAP , IPAP 14/EPAP6, qhs and prn FIO2 60%, titrate to oxygen 

saturations>90%


- bronchodilators per protocol


-ABG prn


-optimize cardiac medications


-CXR prn


- gentle diuresis


- continue dopamine per cardiology recommendations


- aspiration precautions


-continue  Pepcid to bid 


- PT/OT as tolerated, increase activity


- continue other care per attending / other consultants














Subjective


Date of service: 09/14/18


Principal diagnosis: Acute on Chronic Hypoxemic Respiratory Failure; AE-CHF; 

CMOP; Hypotension


Interval history: 





Patient seen and examined.


Vitals, labs, medications, chart reviewed.


States she feels better. Improving shortness of breath, improving leg edema.


Apparently transfer to [place] was rejected.


Has not needed the NIPPV at night, continues to need supplemental oxygen during 

the day at 3l/min











Objective


 Vital Signs - 12hr











  09/13/18 09/13/18 09/13/18





  23:00 23:07 23:15


 


Temperature   


 


Pulse Rate 72 95 H 86


 


Respiratory 23 29 H 26 H





Rate   


 


Blood Pressure 128/96 128/96 128/96


 


O2 Sat by Pulse 91 90 89





Oximetry   














  09/13/18 09/13/18 09/14/18





  23:30 23:45 00:00


 


Temperature   97.9 F


 


Pulse Rate 94 H 109 H 


 


Respiratory 36 H 25 H 





Rate   


 


Blood Pressure 113/85 113/85 


 


O2 Sat by Pulse 93  





Oximetry   














  09/14/18 09/14/18 09/14/18





  00:01 00:15 00:30


 


Temperature   


 


Pulse Rate 113 H 75 80


 


Respiratory 27 H 30 H 21





Rate   


 


Blood Pressure 113/85 85/66 115/89


 


O2 Sat by Pulse  90 95





Oximetry   














  09/14/18 09/14/18 09/14/18





  00:45 01:00 01:15


 


Temperature   


 


Pulse Rate 87 86 98 H


 


Respiratory 18 16 18





Rate   


 


Blood Pressure 115/89 115/89 109/83


 


O2 Sat by Pulse 93 92 92





Oximetry   














  09/14/18 09/14/18 09/14/18





  01:30 01:45 02:00


 


Temperature   


 


Pulse Rate 88 71 90


 


Respiratory 17 14 17





Rate   


 


Blood Pressure 98/76 98/76 102/77


 


O2 Sat by Pulse 94 92 90





Oximetry   














  09/14/18 09/14/18 09/14/18





  02:15 02:30 02:45


 


Temperature   


 


Pulse Rate 97 H 88 89


 


Respiratory 16 16 16





Rate   


 


Blood Pressure 102/77 101/76 101/76


 


O2 Sat by Pulse 92 92 91





Oximetry   














  09/14/18 09/14/18 09/14/18





  03:00 03:15 03:30


 


Temperature   


 


Pulse Rate 82 86 86


 


Respiratory 16 17 19





Rate   


 


Blood Pressure 103/75 103/75 111/85


 


O2 Sat by Pulse 93 92 89





Oximetry   














  09/14/18 09/14/18 09/14/18





  03:45 04:00 04:15


 


Temperature  97.3 F L 


 


Pulse Rate 102 H 84 87


 


Respiratory 21 16 16





Rate   


 


Blood Pressure 111/85 111/85 111/85


 


O2 Sat by Pulse 92 93 93





Oximetry   














  09/14/18 09/14/18 09/14/18





  04:28 04:30 04:45


 


Temperature   


 


Pulse Rate  87 89


 


Respiratory  16 17





Rate   


 


Blood Pressure  102/73 102/73


 


O2 Sat by Pulse 92 93 92





Oximetry   














  09/14/18 09/14/18 09/14/18





  05:00 05:15 05:30


 


Temperature   


 


Pulse Rate 89 86 92 H


 


Respiratory 18 16 16





Rate   


 


Blood Pressure 115/70 115/70 110/83


 


O2 Sat by Pulse 94 94 93





Oximetry   














  09/14/18 09/14/18 09/14/18





  05:45 06:00 06:15


 


Temperature   


 


Pulse Rate 89 90 94 H


 


Respiratory 17 17 19





Rate   


 


Blood Pressure 110/83 120/88 120/88


 


O2 Sat by Pulse 93 90 89





Oximetry   














  09/14/18 09/14/18





  06:30 09:22


 


Temperature  


 


Pulse Rate 87 


 


Respiratory 17 





Rate  


 


Blood Pressure 112/83 


 


O2 Sat by Pulse 90 92





Oximetry  











Constitutional: alert, appears uncomfortable, other (elderly looking AAF, 

normocephalic and atraumatic with increased respiratory effort)


Eyes: non-icteric


ENT: oropharynx moist, other ([place] 3)


Neck: supple, no lymphadenopathy, other (No thyromegaly)


Effort: mildly labored


Ascultation: Bilateral: diminished breath sounds, rales (inspiratory in bases)


Percussion: Bilateral: not dull


Cardiovascular: irregular rhythm, other (, loud P2)


Gastrointestinal: normoactive bowel sounds, soft, non-tender, non-distended, 

other (No HSM)


Integumentary: rash


Extremities: no cyanosis, pulses normal, no ischemia or petechiae, edema (2++)


Neurologic: normal mental status, non-focal exam, pupils equal and round, motor 

strength normal and


Psychiatric: mood appropriate, depressed (affect)


CBC and BMP: 


 09/13/18 05:00





 09/13/18 05:00


ABG, PT/INR, D-dimer: 


ABG











POC ABG pH  7.399  (7.35-7.45)   09/10/18  14:16    


 


POC ABG pCO2  38.9  (35-45)   09/10/18  14:16    


 


POC ABG pO2  63  ()  L  09/10/18  14:16    


 


POC ABG HCO3  24.1   09/10/18  14:16    


 


POC ABG Total CO2  25   09/10/18  14:16    


 


POC ABG O2 Sat  92   09/10/18  14:16    





PT/INR, D-dimer











PT  18.4 Sec. (12.2-14.9)  H  09/12/18  07:00    


 


INR  1.44  (0.87-1.13)  H  09/12/18  07:00    


 


D-Dimer  1149.50 ng/mlDDU (0-234)  H  08/31/18  23:23    








Abnormal lab findings: 


 Abnormal Labs











  08/31/18 08/31/18 08/31/18





  23:23 23:23 23:23


 


WBC   


 


RBC   


 


Hgb   14.9 H 


 


Hct   46.0 H 


 


RDW   18.0 H 


 


Lymph % (Auto)   


 


Mono % (Auto)   


 


Baso % (Auto)   


 


Lymph #   


 


Seg Neutrophils %   


 


Seg Neutrophils #   


 


PT    16.7 H


 


INR    1.30 H


 


D-Dimer    1149.50 H


 


POC ABG pO2   


 


Sodium  136 L  


 


Potassium   


 


Chloride  94.8 L  


 


Carbon Dioxide   


 


BUN  36 H  


 


Glucose  192 H  


 


POC Glucose   


 


Total Bilirubin   


 


Direct Bilirubin   


 


CK-MB (CK-2)   


 


CK-MB (CK-2) Rel Index   


 


NT-Pro-B Natriuret Pep  4255 H  


 


Albumin   


 


LDL Cholesterol Direct   














  08/31/18 09/01/18 09/02/18





  23:23 02:59 08:00


 


WBC   


 


RBC   


 


Hgb   


 


Hct   


 


RDW   


 


Lymph % (Auto)   


 


Mono % (Auto)   


 


Baso % (Auto)   


 


Lymph #   


 


Seg Neutrophils %   


 


Seg Neutrophils #   


 


PT   


 


INR   


 


D-Dimer   


 


POC ABG pO2   


 


Sodium    136 L


 


Potassium   


 


Chloride   


 


Carbon Dioxide   


 


BUN    34 H


 


Glucose    105 H


 


POC Glucose   186 H 


 


Total Bilirubin  1.80 H  


 


Direct Bilirubin  0.6 H  


 


CK-MB (CK-2)   


 


CK-MB (CK-2) Rel Index   


 


NT-Pro-B Natriuret Pep   


 


Albumin  3.6 L  


 


LDL Cholesterol Direct   














  09/03/18 09/03/18 09/04/18





  07:36 07:36 08:02


 


WBC  3.7 L   3.1 L


 


RBC   


 


Hgb  14.6 H  


 


Hct  43.5 H  


 


RDW  17.3 H   17.7 H


 


Lymph % (Auto)  39.9 H   42.9 H


 


Mono % (Auto)  9.0 H   8.3 H


 


Baso % (Auto)   


 


Lymph #   


 


Seg Neutrophils %   


 


Seg Neutrophils #  1.7 L   1.4 L


 


PT   


 


INR   


 


D-Dimer   


 


POC ABG pO2   


 


Sodium   135 L 


 


Potassium   


 


Chloride   


 


Carbon Dioxide   21 L 


 


BUN   33 H 


 


Glucose   


 


POC Glucose   


 


Total Bilirubin   


 


Direct Bilirubin   


 


CK-MB (CK-2)   


 


CK-MB (CK-2) Rel Index   


 


NT-Pro-B Natriuret Pep   


 


Albumin   


 


LDL Cholesterol Direct   














  09/04/18 09/04/18 09/05/18





  08:02 08:02 05:57


 


WBC   


 


RBC   


 


Hgb   


 


Hct   


 


RDW   


 


Lymph % (Auto)   


 


Mono % (Auto)   


 


Baso % (Auto)   


 


Lymph #   


 


Seg Neutrophils %   


 


Seg Neutrophils #   


 


PT   


 


INR   


 


D-Dimer   


 


POC ABG pO2   


 


Sodium    133 L


 


Potassium   


 


Chloride    97.4 L


 


Carbon Dioxide  21 L   17 L


 


BUN  32 H   30 H


 


Glucose    117 H


 


POC Glucose   


 


Total Bilirubin  1.30 H  


 


Direct Bilirubin   


 


CK-MB (CK-2)   


 


CK-MB (CK-2) Rel Index   


 


NT-Pro-B Natriuret Pep   


 


Albumin  3.0 L  


 


LDL Cholesterol Direct   42 L 














  09/05/18 09/05/18 09/05/18





  05:57 17:05 17:05


 


WBC  4.0 L  


 


RBC   


 


Hgb   


 


Hct   


 


RDW  17.8 H  


 


Lymph % (Auto)   


 


Mono % (Auto)  7.4 H  


 


Baso % (Auto)   


 


Lymph #  0.7 L  


 


Seg Neutrophils %  73.2 H  


 


Seg Neutrophils #   


 


PT   


 


INR   


 


D-Dimer   


 


POC ABG pO2   


 


Sodium   


 


Potassium   


 


Chloride   


 


Carbon Dioxide   


 


BUN   


 


Glucose   


 


POC Glucose   


 


Total Bilirubin   


 


Direct Bilirubin   


 


CK-MB (CK-2)    4.8 H


 


CK-MB (CK-2) Rel Index    8.1 H


 


NT-Pro-B Natriuret Pep   3883 H 


 


Albumin   


 


LDL Cholesterol Direct   














  09/06/18 09/06/18 09/06/18





  05:15 05:15 08:28


 


WBC   


 


RBC   


 


Hgb   


 


Hct   


 


RDW  16.9 H  


 


Lymph % (Auto)   


 


Mono % (Auto)   


 


Baso % (Auto)   


 


Lymph #  1.0 L  


 


Seg Neutrophils %  70.4 H  


 


Seg Neutrophils #   


 


PT   


 


INR   


 


D-Dimer   


 


POC ABG pO2    65 L


 


Sodium   


 


Potassium   3.4 L D 


 


Chloride   


 


Carbon Dioxide   


 


BUN   25 H 


 


Glucose   122 H 


 


POC Glucose   


 


Total Bilirubin   


 


Direct Bilirubin   


 


CK-MB (CK-2)   


 


CK-MB (CK-2) Rel Index   


 


NT-Pro-B Natriuret Pep   


 


Albumin   


 


LDL Cholesterol Direct   














  09/07/18 09/07/18 09/07/18





  04:37 04:37 14:13


 


WBC   


 


RBC   


 


Hgb   


 


Hct   


 


RDW   17.4 H 


 


Lymph % (Auto)   


 


Mono % (Auto)   


 


Baso % (Auto)   


 


Lymph #   1.0 L 


 


Seg Neutrophils %   70.7 H 


 


Seg Neutrophils #   


 


PT   


 


INR   


 


D-Dimer   


 


POC ABG pO2    62 L


 


Sodium   


 


Potassium   


 


Chloride   


 


Carbon Dioxide   


 


BUN  26 H  


 


Glucose  137 H  


 


POC Glucose   


 


Total Bilirubin  2.10 H  


 


Direct Bilirubin  1.0 H  


 


CK-MB (CK-2)   


 


CK-MB (CK-2) Rel Index   


 


NT-Pro-B Natriuret Pep   


 


Albumin  3.1 L  


 


LDL Cholesterol Direct   














  09/08/18 09/08/18 09/09/18





  04:20 04:20 05:15


 


WBC  4.1 L  


 


RBC   


 


Hgb   


 


Hct  44.0 H  


 


RDW  18.1 H  


 


Lymph % (Auto)   


 


Mono % (Auto)   


 


Baso % (Auto)  2.7 H  


 


Lymph #   


 


Seg Neutrophils %   


 


Seg Neutrophils #   


 


PT   


 


INR   


 


D-Dimer   


 


POC ABG pO2   


 


Sodium   136 L 


 


Potassium   


 


Chloride   97.8 L  97.8 L


 


Carbon Dioxide   


 


BUN   29 H  31 H


 


Glucose   113 H  116 H


 


POC Glucose   


 


Total Bilirubin   


 


Direct Bilirubin   


 


CK-MB (CK-2)   


 


CK-MB (CK-2) Rel Index   


 


NT-Pro-B Natriuret Pep   


 


Albumin   


 


LDL Cholesterol Direct   














  09/10/18 09/10/18 09/10/18





  05:00 05:00 14:16


 


WBC  3.8 L  


 


RBC  5.22 H  


 


Hgb  15.7 H  


 


Hct  48.0 H  


 


RDW  17.8 H  


 


Lymph % (Auto)  35.4 H  


 


Mono % (Auto)   


 


Baso % (Auto)   


 


Lymph #   


 


Seg Neutrophils %   


 


Seg Neutrophils #   


 


PT   


 


INR   


 


D-Dimer   


 


POC ABG pO2    63 L


 


Sodium   135 L 


 


Potassium   


 


Chloride   


 


Carbon Dioxide   


 


BUN   31 H 


 


Glucose   120 H 


 


POC Glucose   


 


Total Bilirubin   


 


Direct Bilirubin   


 


CK-MB (CK-2)   


 


CK-MB (CK-2) Rel Index   


 


NT-Pro-B Natriuret Pep   


 


Albumin   


 


LDL Cholesterol Direct   














  09/11/18 09/11/18 09/12/18





  17:46 21:35 01:34


 


WBC   


 


RBC   


 


Hgb   


 


Hct   


 


RDW   


 


Lymph % (Auto)   


 


Mono % (Auto)   


 


Baso % (Auto)   


 


Lymph #   


 


Seg Neutrophils %   


 


Seg Neutrophils #   


 


PT   


 


INR   


 


D-Dimer   


 


POC ABG pO2   


 


Sodium   


 


Potassium   


 


Chloride   


 


Carbon Dioxide   


 


BUN   


 


Glucose   


 


POC Glucose  117 H  117 H  128 H


 


Total Bilirubin   


 


Direct Bilirubin   


 


CK-MB (CK-2)   


 


CK-MB (CK-2) Rel Index   


 


NT-Pro-B Natriuret Pep   


 


Albumin   


 


LDL Cholesterol Direct   














  09/12/18 09/12/18 09/12/18





  05:25 07:00 07:00


 


WBC   3.5 L 


 


RBC   5.77 H 


 


Hgb   17.0 H 


 


Hct   52.4 H 


 


RDW   17.5 H 


 


Lymph % (Auto)   


 


Mono % (Auto)   


 


Baso % (Auto)   


 


Lymph #   0.8 L 


 


Seg Neutrophils %   72.2 H 


 


Seg Neutrophils #   


 


PT    18.4 H


 


INR    1.44 H


 


D-Dimer   


 


POC ABG pO2   


 


Sodium   


 


Potassium   


 


Chloride   


 


Carbon Dioxide   


 


BUN   


 


Glucose   


 


POC Glucose  137 H  


 


Total Bilirubin   


 


Direct Bilirubin   


 


CK-MB (CK-2)   


 


CK-MB (CK-2) Rel Index   


 


NT-Pro-B Natriuret Pep   


 


Albumin   


 


LDL Cholesterol Direct   














  09/12/18 09/12/18 09/12/18





  07:00 12:18 18:18


 


WBC   


 


RBC   


 


Hgb   


 


Hct   


 


RDW   


 


Lymph % (Auto)   


 


Mono % (Auto)   


 


Baso % (Auto)   


 


Lymph #   


 


Seg Neutrophils %   


 


Seg Neutrophils #   


 


PT   


 


INR   


 


D-Dimer   


 


POC ABG pO2   


 


Sodium  134 L  


 


Potassium   


 


Chloride  97.5 L  


 


Carbon Dioxide   


 


BUN  33 H  


 


Glucose  145 H  


 


POC Glucose   117 H  138 H


 


Total Bilirubin   


 


Direct Bilirubin   


 


CK-MB (CK-2)   


 


CK-MB (CK-2) Rel Index   


 


NT-Pro-B Natriuret Pep   


 


Albumin   


 


LDL Cholesterol Direct   














  09/12/18 09/13/18 09/13/18





  21:21 02:02 05:00


 


WBC   


 


RBC   


 


Hgb   


 


Hct   


 


RDW   


 


Lymph % (Auto)   


 


Mono % (Auto)   


 


Baso % (Auto)   


 


Lymph #   


 


Seg Neutrophils %   


 


Seg Neutrophils #   


 


PT   


 


INR   


 


D-Dimer   


 


POC ABG pO2   


 


Sodium    132 L


 


Potassium   


 


Chloride    94.3 L


 


Carbon Dioxide   


 


BUN    33 H


 


Glucose    228 H


 


POC Glucose  146 H  167 H 


 


Total Bilirubin   


 


Direct Bilirubin   


 


CK-MB (CK-2)   


 


CK-MB (CK-2) Rel Index   


 


NT-Pro-B Natriuret Pep   


 


Albumin   


 


LDL Cholesterol Direct   














  09/13/18 09/13/18 09/13/18





  05:00 10:33 14:01


 


WBC  3.8 L  


 


RBC  5.12 H  


 


Hgb  15.3 H  


 


Hct  46.4 H D  


 


RDW  17.7 H  


 


Lymph % (Auto)  12.9 L  


 


Mono % (Auto)   


 


Baso % (Auto)   


 


Lymph #  0.5 L  


 


Seg Neutrophils %  83.2 H  


 


Seg Neutrophils #   


 


PT   


 


INR   


 


D-Dimer   


 


POC ABG pO2   


 


Sodium   


 


Potassium   


 


Chloride   


 


Carbon Dioxide   


 


BUN   


 


Glucose   


 


POC Glucose   181 H  215 H


 


Total Bilirubin   


 


Direct Bilirubin   


 


CK-MB (CK-2)   


 


CK-MB (CK-2) Rel Index   


 


NT-Pro-B Natriuret Pep   


 


Albumin   


 


LDL Cholesterol Direct   














  09/13/18 09/14/18 09/14/18





  22:17 02:17 06:36


 


WBC   


 


RBC   


 


Hgb   


 


Hct   


 


RDW   


 


Lymph % (Auto)   


 


Mono % (Auto)   


 


Baso % (Auto)   


 


Lymph #   


 


Seg Neutrophils %   


 


Seg Neutrophils #   


 


PT   


 


INR   


 


D-Dimer   


 


POC ABG pO2   


 


Sodium   


 


Potassium   


 


Chloride   


 


Carbon Dioxide   


 


BUN   


 


Glucose   


 


POC Glucose  161 H  161 H  178 H


 


Total Bilirubin   


 


Direct Bilirubin   


 


CK-MB (CK-2)   


 


CK-MB (CK-2) Rel Index   


 


NT-Pro-B Natriuret Pep   


 


Albumin   


 


LDL Cholesterol Direct   














  09/14/18





  10:04


 


WBC 


 


RBC 


 


Hgb 


 


Hct 


 


RDW 


 


Lymph % (Auto) 


 


Mono % (Auto) 


 


Baso % (Auto) 


 


Lymph # 


 


Seg Neutrophils % 


 


Seg Neutrophils # 


 


PT 


 


INR 


 


D-Dimer 


 


POC ABG pO2 


 


Sodium 


 


Potassium 


 


Chloride 


 


Carbon Dioxide 


 


BUN 


 


Glucose 


 


POC Glucose  146 H


 


Total Bilirubin 


 


Direct Bilirubin 


 


CK-MB (CK-2) 


 


CK-MB (CK-2) Rel Index 


 


NT-Pro-B Natriuret Pep 


 


Albumin 


 


LDL Cholesterol Direct 











CT scan - chest: image reviewed (No evidence of fibrosis, bronchiectasis, no 

GGOs)


Allied health notes reviewed: nursing

## 2018-09-14 NOTE — PROGRESS NOTE
Assessment and Plan


Transfer to [place] was ultimately denied. 


BPs improving. Wean dopamine gtt as tolerated. 


Increase IV lasix to 40mg BID and monitor BPs closely. 


No BB and/or ACEI/ARB at this time in setting of low BPs. 


Will obtain PT eval and encourage mobilization. 


Follow pulmonary recs. 





The patient has been seen in conjunction with Dr. Valentin who agrees with the 

assessment and plan of care. 





- Patient Problems


(1) Biventricular congestive heart failure


Current Visit: Yes   Status: Acute   





(2) Nonischemic cardiomyopathy


Current Visit: Yes   Status: Chronic   





(3) Acute respiratory failure


Current Visit: Yes   Status: Acute   





(4) Paroxysmal atrial fibrillation with RVR


Current Visit: Yes   Status: Acute   





(5) Cor pulmonale


Current Visit: Yes   Status: Chronic   





(6) Pulmonary HTN


Current Visit: Yes   Status: Chronic   





(7) Tricuspid regurgitation


Current Visit: Yes   Status: Chronic   





(8) PFO (patent foramen ovale)


Current Visit: Yes   Status: Chronic   





(9) Hypotension


Current Visit: Yes   Status: Chronic   





(10) Hypoxia


Current Visit: Yes   Status: Chronic   





(11) Interstitial lung disease


Current Visit: Yes   Status: Suspected   





Subjective


Date of service: 09/14/18


Principal diagnosis: Acute on Chronic Hypoxemic Respiratory Failure; AE-CHF; 

CMOP; Hypotension


Interval history: 


Pt resting in bed, appears comfortable. on O2 via NC. BLE edema improving. 

Dopamine gtt infusing. pt states she wants to try to get out of bed today. 





Objective





  Last Vital Signs











Temp  97.3 F L  09/14/18 04:00


 


Pulse  87   09/14/18 06:30


 


Resp  17   09/14/18 06:30


 


BP  112/83   09/14/18 06:30


 


Pulse Ox  92   09/14/18 09:22




















- Physical Examination


General: No Apparent Distress


HEENT: Positive: PERRL, Normocephaly, Mucus Membranes Moist


Neck: Positive: neck supple, trachea midline


Cardiac: Positive: Reg Rate and Rhythm, S1/S2


Lungs: Positive: Decreased Breath Sounds


Neuro: Positive: Grossly Intact


Abdomen: Positive: Soft.  Negative: Tender


Skin: Negative: Wound


Extremities: Present: +2 Edema (BLE)





- Imaging and Cardiology


EKG: report reviewed, image reviewed


Echo: report reviewed (7/9/2018: VICENTE; EF 45-50%, marked RA enlargement, mod RV 

enlargement, mod RV systolic dysfunction, trace MR, mod to severe TR, mild pulm 

HTN with RVSP 38mmHg, mod left to right shunt suggestive of ostium secundum 

atrial septal defect)


Cardiac cath: report reviewed (7/10/2018: LHC & RHC, NO ASD, no O2 sat step-up, 

EF 35%, normal coronaries, mod pulmonary HTN. )





- Telemetry


EKG Rhythm: Sinus Rhythm





- Allied health notes


Allied health notes reviewed: nursing

## 2018-09-14 NOTE — PROGRESS NOTE
Assessment and Plan


Assessment and plan: 


Cardiology was planning to transfer the patient to [facility] 

yesterday


Dr. Derek Miller has agreed to accept the patient, [facility] denied transfer





--Hypotension; continue midodrine and dopamine ,blood pressures minimally 

improved


Beta blockers and ace inhibitors are on hold, Lasix as needed, cardiology 

following





--Acute on chronic systolic congestive heart failure; with severe lower 

extremity edema


Ejection fraction 45-50%, management per cardiology





--Acute on chronic respiratory failure; patient is stable on nasal cannula 

oxygen


 BiPAP as needed , evaluate for home oxygen at discharge





--New onset paroxysmal A. fib with rapid ventricular rate; now rate controlled


 cardiology changed Lovenox to DVT prophylaxis dose.  No anticoagulation at 

this point





--Mild malnutrition; supportive care and nutrition supplements





--DVT prophylaxis; Lovenox





--Full CODE STATUS





Plan of care reviewed with the patient and the family member at the bedside





Critical care time 32 minutes





History


Interval history: 


Patient seen and examined medical records reviewed


Cardiology initiated transfer to [facility]


However transfer was denied.  





Patient complains of generalized weakness , denies chest pain shortness of 

breath


Remains on dopamine and admitted to, blood pressure is slightly improved


Vital signs reviewed














Hospitalist Physical





- Constitutional


Vitals: 


 











Temp Pulse Resp BP Pulse Ox


 


 97.3 F L  87   17   112/83   92 


 


 09/14/18 04:00  09/14/18 06:30  09/14/18 06:30  09/14/18 06:30  09/14/18 09:22











General appearance: Present: no acute distress, well-nourished, other (nasal 

cannula oxygen)





- EENT


Eyes: Present: PERRL, EOM intact





- Neck


Neck: Present: supple, normal ROM





- Respiratory


Respiratory effort: normal


Respiratory: bilateral: diminished, rales, negative: rhonchi, wheezing





- Cardiovascular


Rhythm: regular


Heart Sounds: Present: S1 & S2





- Extremities


Extremities: no ischemia


Extremity abnormal: edema (bilateral edema, slightly improved)





- Abdominal


General gastrointestinal: soft, non-tender, non-distended, normal bowel sounds





- Integumentary


Integumentary: Present: clear, warm





- Psychiatric


Psychiatric: appropriate mood/affect, cooperative





- Neurologic


Neurologic: moves all extremities





Results





- Labs


CBC & Chem 7: 


 09/13/18 05:00





 09/13/18 05:00


Labs: 


 Laboratory Last Values











WBC  3.8 K/mm3 (4.5-11.0)  L  09/13/18  05:00    


 


RBC  5.12 M/mm3 (3.65-5.03)  H  09/13/18  05:00    


 


Hgb  15.3 gm/dl (10.1-14.3)  H  09/13/18  05:00    


 


Hct  46.4 % (30.3-42.9)  H D 09/13/18  05:00    


 


MCV  91 fl (79-97)   09/13/18  05:00    


 


MCH  30 pg (28-32)   09/13/18  05:00    


 


MCHC  33 % (30-34)   09/13/18  05:00    


 


RDW  17.7 % (13.2-15.2)  H  09/13/18  05:00    


 


Plt Count  209 K/mm3 (140-440)   09/13/18  05:00    


 


Lymph % (Auto)  12.9 % (13.4-35.0)  L  09/13/18  05:00    


 


Mono % (Auto)  3.0 % (0.0-7.3)   09/13/18  05:00    


 


Eos % (Auto)  0.0 % (0.0-4.3)   09/13/18  05:00    


 


Baso % (Auto)  0.9 % (0.0-1.8)   09/13/18  05:00    


 


Lymph #  0.5 K/mm3 (1.2-5.4)  L  09/13/18  05:00    


 


Mono #  0.1 K/mm3 (0.0-0.8)   09/13/18  05:00    


 


Eos #  0.0 K/mm3 (0.0-0.4)   09/13/18  05:00    


 


Baso #  0.0 K/mm3 (0.0-0.1)   09/13/18  05:00    


 


Seg Neutrophils %  83.2 % (40.0-70.0)  H  09/13/18  05:00    


 


Seg Neutrophils #  3.2 K/mm3 (1.8-7.7)   09/13/18  05:00    


 


PT  18.4 Sec. (12.2-14.9)  H  09/12/18  07:00    


 


INR  1.44  (0.87-1.13)  H  09/12/18  07:00    


 


APTT  29.2 Sec. (24.2-36.6)   08/31/18  23:23    


 


D-Dimer  1149.50 ng/mlDDU (0-234)  H  08/31/18  23:23    


 


POC ABG pH  7.399  (7.35-7.45)   09/10/18  14:16    


 


POC ABG pCO2  38.9  (35-45)   09/10/18  14:16    


 


POC ABG pO2  63  ()  L  09/10/18  14:16    


 


POC ABG HCO3  24.1   09/10/18  14:16    


 


POC ABG Total CO2  25   09/10/18  14:16    


 


POC ABG O2 Sat  92   09/10/18  14:16    


 


POC ABG Base Excess  -1   09/10/18  14:16    


 


FiO2  45 %  09/10/18  14:16    


 


Sodium  132 mmol/L (137-145)  L  09/13/18  05:00    


 


Potassium  4.2 mmol/L (3.6-5.0)   09/13/18  05:00    


 


Chloride  94.3 mmol/L ()  L  09/13/18  05:00    


 


Carbon Dioxide  22 mmol/L (22-30)   09/13/18  05:00    


 


Anion Gap  20 mmol/L  09/13/18  05:00    


 


BUN  33 mg/dL (7-17)  H  09/13/18  05:00    


 


Creatinine  1.1 mg/dL (0.7-1.2)   09/13/18  05:00    


 


Estimated GFR  > 60 ml/min  09/13/18  05:00    


 


BUN/Creatinine Ratio  30 %  09/13/18  05:00    


 


Glucose  228 mg/dL ()  H  09/13/18  05:00    


 


POC Glucose  146  ()  H  09/14/18  10:04    


 


Calcium  8.8 mg/dL (8.4-10.2)   09/13/18  05:00    


 


Phosphorus  3.00 mg/dL (2.5-4.5)   09/05/18  05:57    


 


Magnesium  2.10 mg/dL (1.7-2.3)   09/13/18  05:00    


 


Total Bilirubin  2.10 mg/dL (0.1-1.2)  H  09/07/18  04:37    


 


Direct Bilirubin  1.0 mg/dL (0-0.2)  H  09/07/18  04:37    


 


Indirect Bilirubin  1.1 mg/dL  09/07/18  04:37    


 


AST  18 units/L (5-40)   09/07/18  04:37    


 


ALT  23 units/L (7-56)   09/07/18  04:37    


 


Alkaline Phosphatase  111 units/L ()   09/07/18  04:37    


 


Total Creatine Kinase  59 units/L ()   09/05/18  17:05    


 


CK-MB (CK-2)  4.8 ng/mL (0.0-4.0)  H  09/05/18  17:05    


 


CK-MB (CK-2) Rel Index  8.1  (0-4)  H  09/05/18  17:05    


 


Troponin T  < 0.010 ng/mL (0.00-0.029)   09/05/18  17:05    


 


NT-Pro-B Natriuret Pep  3883 pg/mL (0-900)  H  09/05/18  17:05    


 


Total Protein  6.8 g/dL (6.3-8.2)   09/07/18  04:37    


 


Albumin  3.1 g/dL (3.9-5)  L  09/07/18  04:37    


 


Albumin/Globulin Ratio  0.8 %  09/07/18  04:37    


 


Triglycerides  54 mg/dL (2-149)   09/04/18  08:02    


 


Cholesterol  95 mg/dL ()   09/04/18  08:02    


 


LDL Cholesterol Direct  42 mg/dL ()  L  09/04/18  08:02    


 


HDL Cholesterol  49 mg/dL (40-59)   09/04/18  08:02    


 


Cholesterol/HDL Ratio  1.93 %  09/04/18  08:02

## 2018-09-15 LAB
BASOPHILS # (AUTO): 0 K/MM3 (ref 0–0.1)
BASOPHILS NFR BLD AUTO: 0.1 % (ref 0–1.8)
BUN SERPL-MCNC: 36 MG/DL (ref 7–17)
BUN/CREAT SERPL: 30 %
CALCIUM SERPL-MCNC: 9 MG/DL (ref 8.4–10.2)
EOSINOPHIL # BLD AUTO: 0 K/MM3 (ref 0–0.4)
EOSINOPHIL NFR BLD AUTO: 0 % (ref 0–4.3)
HCT VFR BLD CALC: 52.5 % (ref 30.3–42.9)
HEMOLYSIS INDEX: 104
HGB BLD-MCNC: 17 GM/DL (ref 10.1–14.3)
LYMPHOCYTES # BLD AUTO: 1 K/MM3 (ref 1.2–5.4)
LYMPHOCYTES NFR BLD AUTO: 8.3 % (ref 13.4–35)
MCH RBC QN AUTO: 30 PG (ref 28–32)
MCHC RBC AUTO-ENTMCNC: 32 % (ref 30–34)
MCV RBC AUTO: 92 FL (ref 79–97)
MONOCYTES # (AUTO): 0.7 K/MM3 (ref 0–0.8)
MONOCYTES % (AUTO): 5.6 % (ref 0–7.3)
PLATELET # BLD: 177 K/MM3 (ref 140–440)
RBC # BLD AUTO: 5.69 M/MM3 (ref 3.65–5.03)

## 2018-09-15 RX ADMIN — DOCUSATE SODIUM SCH MG: 100 CAPSULE, LIQUID FILLED ORAL at 10:04

## 2018-09-15 RX ADMIN — MIDODRINE HYDROCHLORIDE SCH MG: 5 TABLET ORAL at 05:09

## 2018-09-15 RX ADMIN — ASPIRIN SCH MG: 81 TABLET, COATED ORAL at 10:04

## 2018-09-15 RX ADMIN — PANTOPRAZOLE SODIUM SCH MG: 40 INJECTION, POWDER, FOR SOLUTION INTRAVENOUS at 22:20

## 2018-09-15 RX ADMIN — PANTOPRAZOLE SODIUM SCH MG: 40 INJECTION, POWDER, FOR SOLUTION INTRAVENOUS at 16:14

## 2018-09-15 RX ADMIN — FUROSEMIDE SCH MG: 10 INJECTION, SOLUTION INTRAVENOUS at 05:09

## 2018-09-15 RX ADMIN — PHENYLEPHRINE HYDROCHLORIDE SCH MLS/HR: 10 INJECTION INTRAVENOUS at 15:11

## 2018-09-15 RX ADMIN — FUROSEMIDE SCH MG: 10 INJECTION, SOLUTION INTRAVENOUS at 17:38

## 2018-09-15 RX ADMIN — MIDODRINE HYDROCHLORIDE SCH MG: 5 TABLET ORAL at 22:20

## 2018-09-15 RX ADMIN — PHENYLEPHRINE HYDROCHLORIDE SCH MLS/HR: 10 INJECTION INTRAVENOUS at 22:18

## 2018-09-15 RX ADMIN — MILRINONE LACTATE IN DEXTROSE SCH MLS/HR: 200 INJECTION, SOLUTION INTRAVENOUS at 17:39

## 2018-09-15 RX ADMIN — MIDODRINE HYDROCHLORIDE SCH MG: 5 TABLET ORAL at 13:00

## 2018-09-15 NOTE — PROGRESS NOTE
Assessment and Plan








Syncope


Acute on chronic respiratory failure (2nd to CHF, fluid overload)


Orthopnea


Chronic Biventricular CHF with acute exacerbation


NICMOP


Paroxysmal Atrial Fibrillation


H/O Pulmonary HTN


PFO


Hypotension (h/o Hypertension)


Hypokalemia (likely due to diuretic)


Moderate protein calorie malnutrition





(CT chest negative for significant DPLD; overall picture more consistent with 

pump failure and interstitial edema)





- begin low dose milrinone and watch closely for arrhythmia's (discussed with 

cardiology)


- continue diuresis


- place NGT and begin enteral nutrition


- follow MATT, ACE levels +/- ANCA's and re-evaluate


- taper systemic steroids


- continue to optimize cardiac function re: high PCWP


- get USS for induration / swelling onder left breast


- continue GI prophylaxis


- continue full anticoagulation for A-fib


- continue supplemental oxygen to keep sats > 90% (HFNC)


- continue BIPAP at  increased settings of 14/10 (Scheduled qhs)


- continue bronchodilators with pulmonary hygiene per RT


- continue gentle diuresis


- continue dopamine per cardiology recommendations


- continue aspiration precautions


- increase Pepcid to bid re: reflux symptoms


- PT/OT as tolerated


- mobility protocol for pressure ulcer prophylaxis


- target MAP > 65mmHg


- continue other care per attending / other consultants





... care plan and overall hospital course and strategies discussed at length >

15 mins with patient, her daughter and nurse in room. All her questions were 

answered





The high probability of a clinically significant, sudden or life-threatening 

deterioration of the [cardiac, respiratory] system(s) required my full and 

direct attention, intervention and personal management. The aggregate critical 

care time was [45] minutes without overlap. Time includes spent on;





[x] Data Review and interpretation 





[x] Patient assessment and monitoring of vital signs 





[x] Documentation 





[x] Medication orders and management











Subjective


Date of service: 09/15/18


Principal diagnosis: Acute on Chronic Hypoxemic Respiratory Failure; AE-CHF; 

CMOP; Hypotension


Interval history: 





Patient is seen today for: Acute on Chronic Hypoxemic Respiratory Failure; AE-

CHF; CMOP; Hypotension; Symptomatic Bradycardia





Seen and examined at bedside; 24hour events reviewed; nursing and respiratory 

care staff consulted; no adverse overnight events reported to me; resting in bed

; somnolent; weak; clinically like the "phenotypic cardiac cripple"; daughter 

in room; not eating; tachycardic; remains on dopamine; + small volume N&V 

without overt aspiration; denies acute chest pains or palpitations





Objective


 Vital Signs - 12hr











  09/14/18 09/14/18 09/14/18





  22:30 22:46 23:00


 


Temperature   


 


Pulse Rate 96 H 79 80


 


Respiratory 22 17 15





Rate   


 


Respiratory   





Rate [   





Generalized]   


 


Blood Pressure 107/83 112/88 125/97


 


O2 Sat by Pulse  93 92





Oximetry   














  09/14/18 09/14/18 09/14/18





  23:16 23:20 23:21


 


Temperature   98.4 F


 


Pulse Rate 99 H 75 


 


Respiratory 14 16 





Rate   


 


Respiratory   





Rate [   





Generalized]   


 


Blood Pressure 125/97 125/97 


 


O2 Sat by Pulse 93 93 





Oximetry   














  09/14/18 09/14/18 09/15/18





  23:30 23:46 00:00


 


Temperature   


 


Pulse Rate 80 95 H 92 H


 


Respiratory 16 16 15





Rate   


 


Respiratory   





Rate [   





Generalized]   


 


Blood Pressure 126/95 126/95 86/60


 


O2 Sat by Pulse  93 99





Oximetry   














  09/15/18 09/15/18 09/15/18





  00:16 00:30 00:46


 


Temperature   


 


Pulse Rate 82 88 87


 


Respiratory 17 15 14





Rate   


 


Respiratory   





Rate [   





Generalized]   


 


Blood Pressure 126/95 106/82 86/60


 


O2 Sat by Pulse 92 93 91





Oximetry   














  09/15/18 09/15/18 09/15/18





  01:00 01:16 01:30


 


Temperature   


 


Pulse Rate 91 H 93 H 92 H


 


Respiratory 15 15 13





Rate   


 


Respiratory   





Rate [   





Generalized]   


 


Blood Pressure 106/82 106/82 113/82


 


O2 Sat by Pulse 90 92 92





Oximetry   














  09/15/18 09/15/18 09/15/18





  01:46 02:00 02:16


 


Temperature   


 


Pulse Rate 102 H 104 H 99 H


 


Respiratory 21 34 H 22





Rate   


 


Respiratory   





Rate [   





Generalized]   


 


Blood Pressure 103/73 96/73 96/73


 


O2 Sat by Pulse 91 93 92





Oximetry   














  09/15/18 09/15/18 09/15/18





  02:30 02:46 03:00


 


Temperature   


 


Pulse Rate 93 H 80 96 H


 


Respiratory 18 18 15





Rate   


 


Respiratory   





Rate [   





Generalized]   


 


Blood Pressure 109/82 109/82 111/84


 


O2 Sat by Pulse 92 92 92





Oximetry   














  09/15/18 09/15/18 09/15/18





  03:16 03:30 03:46


 


Temperature   


 


Pulse Rate 86 88 92 H


 


Respiratory 16 15 13





Rate   


 


Respiratory   





Rate [   





Generalized]   


 


Blood Pressure 111/84 119/88 119/88


 


O2 Sat by Pulse 92 93 91





Oximetry   














  09/15/18 09/15/18 09/15/18





  03:55 04:00 04:16


 


Temperature 98.8 F  


 


Pulse Rate  101 H 107 H


 


Respiratory  15 17





Rate   


 


Respiratory   





Rate [   





Generalized]   


 


Blood Pressure  114/82 114/82


 


O2 Sat by Pulse  96 92





Oximetry   














  09/15/18 09/15/18 09/15/18





  04:30 04:46 05:00


 


Temperature   


 


Pulse Rate 109 H 92 H 93 H


 


Respiratory 31 H 17 13





Rate   


 


Respiratory   





Rate [   





Generalized]   


 


Blood Pressure 103/71 103/71 107/80


 


O2 Sat by Pulse 95 91 90





Oximetry   














  09/15/18 09/15/18 09/15/18





  05:16 05:30 05:46


 


Temperature   


 


Pulse Rate 105 H 99 H 100 H


 


Respiratory 16 17 17





Rate   


 


Respiratory   





Rate [   





Generalized]   


 


Blood Pressure 103/71 101/76 101/76


 


O2 Sat by Pulse 92 94 91





Oximetry   














  09/15/18 09/15/18 09/15/18





  06:00 06:16 06:30


 


Temperature   


 


Pulse Rate 101 H 98 H 100 H


 


Respiratory 16 16 32 H





Rate   


 


Respiratory   





Rate [   





Generalized]   


 


Blood Pressure 104/77 104/77 109/78


 


O2 Sat by Pulse 92 91 89





Oximetry   














  09/15/18 09/15/18 09/15/18





  06:46 07:00 07:16


 


Temperature   


 


Pulse Rate 102 H 99 H 99 H


 


Respiratory 23 24 15





Rate   


 


Respiratory   





Rate [   





Generalized]   


 


Blood Pressure 109/78 100/78 100/78


 


O2 Sat by Pulse 89 93 91





Oximetry   














  09/15/18 09/15/18 09/15/18





  07:30 07:46 08:00


 


Temperature   90.8 F L


 


Pulse Rate 97 H 95 H 96 H


 


Respiratory 17 15 14





Rate   


 


Respiratory   





Rate [   





Generalized]   


 


Blood Pressure 103/73 103/73 103/73


 


O2 Sat by Pulse 91 91 91





Oximetry   














  09/15/18 09/15/18 09/15/18





  08:16 08:30 08:46


 


Temperature   


 


Pulse Rate 94 H 104 H 100 H


 


Respiratory 17 20 22





Rate   


 


Respiratory   





Rate [   





Generalized]   


 


Blood Pressure 102/77 97/75 97/75


 


O2 Sat by Pulse 92 92 89





Oximetry   














  09/15/18 09/15/18 09/15/18





  09:00 09:16 09:35


 


Temperature   


 


Pulse Rate 94 H 103 H 


 


Respiratory 22 30 H 





Rate   


 


Respiratory   





Rate [   





Generalized]   


 


Blood Pressure 99/77 99/77 


 


O2 Sat by Pulse 91 91 91





Oximetry   














  09/15/18





  10:00


 


Temperature 


 


Pulse Rate 103 H


 


Respiratory 





Rate 


 


Respiratory 21





Rate [ 





Generalized] 


 


Blood Pressure 


 


O2 Sat by Pulse 





Oximetry 











Constitutional: alert, appears uncomfortable, other (elderly looking AAF, 

normocephalic and atraumatic with increased respiratory effort)


Eyes: non-icteric


ENT: oropharynx moist, other ([place] 3)


Neck: supple, no lymphadenopathy, other (No thyromegaly)


Effort: mildly labored


Ascultation: Bilateral: diminished breath sounds, rales (scant inspiratory in 

bases)


Percussion: Bilateral: not dull


Cardiovascular: irregular rhythm, other (, loud P2)


Gastrointestinal: normoactive bowel sounds, soft, non-tender, non-distended, 

other (No HSM)


Integumentary: rash


Extremities: no cyanosis, pulses normal, no ischemia or petechiae, edema (2++)


Neurologic: normal mental status, non-focal exam, pupils equal and round, motor 

strength normal and


Psychiatric: mood appropriate, depressed (affect)


CBC and BMP: 


 09/15/18 04:21





 09/15/18 04:21


ABG, PT/INR, D-dimer: 


ABG











POC ABG pH  7.399  (7.35-7.45)   09/10/18  14:16    


 


POC ABG pCO2  38.9  (35-45)   09/10/18  14:16    


 


POC ABG pO2  63  ()  L  09/10/18  14:16    


 


POC ABG HCO3  24.1   09/10/18  14:16    


 


POC ABG Total CO2  25   09/10/18  14:16    


 


POC ABG O2 Sat  92   09/10/18  14:16    





PT/INR, D-dimer











PT  18.4 Sec. (12.2-14.9)  H  09/12/18  07:00    


 


INR  1.44  (0.87-1.13)  H  09/12/18  07:00    


 


D-Dimer  1149.50 ng/mlDDU (0-234)  H  08/31/18  23:23    








Abnormal lab findings: 


 Abnormal Labs











  08/31/18 08/31/18 08/31/18





  23:23 23:23 23:23


 


WBC   


 


RBC   


 


Hgb   14.9 H 


 


Hct   46.0 H 


 


RDW   18.0 H 


 


Lymph % (Auto)   


 


Mono % (Auto)   


 


Baso % (Auto)   


 


Lymph #   


 


Seg Neutrophils %   


 


Seg Neutrophils #   


 


PT    16.7 H


 


INR    1.30 H


 


D-Dimer    1149.50 H


 


POC ABG pO2   


 


Sodium  136 L  


 


Potassium   


 


Chloride  94.8 L  


 


Carbon Dioxide   


 


BUN  36 H  


 


Glucose  192 H  


 


POC Glucose   


 


Magnesium   


 


Total Bilirubin   


 


Direct Bilirubin   


 


CK-MB (CK-2)   


 


CK-MB (CK-2) Rel Index   


 


NT-Pro-B Natriuret Pep  4255 H  


 


Albumin   


 


LDL Cholesterol Direct   














  08/31/18 09/01/18 09/02/18





  23:23 02:59 08:00


 


WBC   


 


RBC   


 


Hgb   


 


Hct   


 


RDW   


 


Lymph % (Auto)   


 


Mono % (Auto)   


 


Baso % (Auto)   


 


Lymph #   


 


Seg Neutrophils %   


 


Seg Neutrophils #   


 


PT   


 


INR   


 


D-Dimer   


 


POC ABG pO2   


 


Sodium    136 L


 


Potassium   


 


Chloride   


 


Carbon Dioxide   


 


BUN    34 H


 


Glucose    105 H


 


POC Glucose   186 H 


 


Magnesium   


 


Total Bilirubin  1.80 H  


 


Direct Bilirubin  0.6 H  


 


CK-MB (CK-2)   


 


CK-MB (CK-2) Rel Index   


 


NT-Pro-B Natriuret Pep   


 


Albumin  3.6 L  


 


LDL Cholesterol Direct   














  09/03/18 09/03/18 09/04/18





  07:36 07:36 08:02


 


WBC  3.7 L   3.1 L


 


RBC   


 


Hgb  14.6 H  


 


Hct  43.5 H  


 


RDW  17.3 H   17.7 H


 


Lymph % (Auto)  39.9 H   42.9 H


 


Mono % (Auto)  9.0 H   8.3 H


 


Baso % (Auto)   


 


Lymph #   


 


Seg Neutrophils %   


 


Seg Neutrophils #  1.7 L   1.4 L


 


PT   


 


INR   


 


D-Dimer   


 


POC ABG pO2   


 


Sodium   135 L 


 


Potassium   


 


Chloride   


 


Carbon Dioxide   21 L 


 


BUN   33 H 


 


Glucose   


 


POC Glucose   


 


Magnesium   


 


Total Bilirubin   


 


Direct Bilirubin   


 


CK-MB (CK-2)   


 


CK-MB (CK-2) Rel Index   


 


NT-Pro-B Natriuret Pep   


 


Albumin   


 


LDL Cholesterol Direct   














  09/04/18 09/04/18 09/05/18





  08:02 08:02 05:57


 


WBC   


 


RBC   


 


Hgb   


 


Hct   


 


RDW   


 


Lymph % (Auto)   


 


Mono % (Auto)   


 


Baso % (Auto)   


 


Lymph #   


 


Seg Neutrophils %   


 


Seg Neutrophils #   


 


PT   


 


INR   


 


D-Dimer   


 


POC ABG pO2   


 


Sodium    133 L


 


Potassium   


 


Chloride    97.4 L


 


Carbon Dioxide  21 L   17 L


 


BUN  32 H   30 H


 


Glucose    117 H


 


POC Glucose   


 


Magnesium   


 


Total Bilirubin  1.30 H  


 


Direct Bilirubin   


 


CK-MB (CK-2)   


 


CK-MB (CK-2) Rel Index   


 


NT-Pro-B Natriuret Pep   


 


Albumin  3.0 L  


 


LDL Cholesterol Direct   42 L 














  09/05/18 09/05/18 09/05/18





  05:57 17:05 17:05


 


WBC  4.0 L  


 


RBC   


 


Hgb   


 


Hct   


 


RDW  17.8 H  


 


Lymph % (Auto)   


 


Mono % (Auto)  7.4 H  


 


Baso % (Auto)   


 


Lymph #  0.7 L  


 


Seg Neutrophils %  73.2 H  


 


Seg Neutrophils #   


 


PT   


 


INR   


 


D-Dimer   


 


POC ABG pO2   


 


Sodium   


 


Potassium   


 


Chloride   


 


Carbon Dioxide   


 


BUN   


 


Glucose   


 


POC Glucose   


 


Magnesium   


 


Total Bilirubin   


 


Direct Bilirubin   


 


CK-MB (CK-2)    4.8 H


 


CK-MB (CK-2) Rel Index    8.1 H


 


NT-Pro-B Natriuret Pep   3883 H 


 


Albumin   


 


LDL Cholesterol Direct   














  09/06/18 09/06/18 09/06/18





  05:15 05:15 08:28


 


WBC   


 


RBC   


 


Hgb   


 


Hct   


 


RDW  16.9 H  


 


Lymph % (Auto)   


 


Mono % (Auto)   


 


Baso % (Auto)   


 


Lymph #  1.0 L  


 


Seg Neutrophils %  70.4 H  


 


Seg Neutrophils #   


 


PT   


 


INR   


 


D-Dimer   


 


POC ABG pO2    65 L


 


Sodium   


 


Potassium   3.4 L D 


 


Chloride   


 


Carbon Dioxide   


 


BUN   25 H 


 


Glucose   122 H 


 


POC Glucose   


 


Magnesium   


 


Total Bilirubin   


 


Direct Bilirubin   


 


CK-MB (CK-2)   


 


CK-MB (CK-2) Rel Index   


 


NT-Pro-B Natriuret Pep   


 


Albumin   


 


LDL Cholesterol Direct   














  09/07/18 09/07/18 09/07/18





  04:37 04:37 14:13


 


WBC   


 


RBC   


 


Hgb   


 


Hct   


 


RDW   17.4 H 


 


Lymph % (Auto)   


 


Mono % (Auto)   


 


Baso % (Auto)   


 


Lymph #   1.0 L 


 


Seg Neutrophils %   70.7 H 


 


Seg Neutrophils #   


 


PT   


 


INR   


 


D-Dimer   


 


POC ABG pO2    62 L


 


Sodium   


 


Potassium   


 


Chloride   


 


Carbon Dioxide   


 


BUN  26 H  


 


Glucose  137 H  


 


POC Glucose   


 


Magnesium   


 


Total Bilirubin  2.10 H  


 


Direct Bilirubin  1.0 H  


 


CK-MB (CK-2)   


 


CK-MB (CK-2) Rel Index   


 


NT-Pro-B Natriuret Pep   


 


Albumin  3.1 L  


 


LDL Cholesterol Direct   














  09/08/18 09/08/18 09/09/18





  04:20 04:20 05:15


 


WBC  4.1 L  


 


RBC   


 


Hgb   


 


Hct  44.0 H  


 


RDW  18.1 H  


 


Lymph % (Auto)   


 


Mono % (Auto)   


 


Baso % (Auto)  2.7 H  


 


Lymph #   


 


Seg Neutrophils %   


 


Seg Neutrophils #   


 


PT   


 


INR   


 


D-Dimer   


 


POC ABG pO2   


 


Sodium   136 L 


 


Potassium   


 


Chloride   97.8 L  97.8 L


 


Carbon Dioxide   


 


BUN   29 H  31 H


 


Glucose   113 H  116 H


 


POC Glucose   


 


Magnesium   


 


Total Bilirubin   


 


Direct Bilirubin   


 


CK-MB (CK-2)   


 


CK-MB (CK-2) Rel Index   


 


NT-Pro-B Natriuret Pep   


 


Albumin   


 


LDL Cholesterol Direct   














  09/10/18 09/10/18 09/10/18





  05:00 05:00 14:16


 


WBC  3.8 L  


 


RBC  5.22 H  


 


Hgb  15.7 H  


 


Hct  48.0 H  


 


RDW  17.8 H  


 


Lymph % (Auto)  35.4 H  


 


Mono % (Auto)   


 


Baso % (Auto)   


 


Lymph #   


 


Seg Neutrophils %   


 


Seg Neutrophils #   


 


PT   


 


INR   


 


D-Dimer   


 


POC ABG pO2    63 L


 


Sodium   135 L 


 


Potassium   


 


Chloride   


 


Carbon Dioxide   


 


BUN   31 H 


 


Glucose   120 H 


 


POC Glucose   


 


Magnesium   


 


Total Bilirubin   


 


Direct Bilirubin   


 


CK-MB (CK-2)   


 


CK-MB (CK-2) Rel Index   


 


NT-Pro-B Natriuret Pep   


 


Albumin   


 


LDL Cholesterol Direct   














  09/11/18 09/11/18 09/12/18





  17:46 21:35 01:34


 


WBC   


 


RBC   


 


Hgb   


 


Hct   


 


RDW   


 


Lymph % (Auto)   


 


Mono % (Auto)   


 


Baso % (Auto)   


 


Lymph #   


 


Seg Neutrophils %   


 


Seg Neutrophils #   


 


PT   


 


INR   


 


D-Dimer   


 


POC ABG pO2   


 


Sodium   


 


Potassium   


 


Chloride   


 


Carbon Dioxide   


 


BUN   


 


Glucose   


 


POC Glucose  117 H  117 H  128 H


 


Magnesium   


 


Total Bilirubin   


 


Direct Bilirubin   


 


CK-MB (CK-2)   


 


CK-MB (CK-2) Rel Index   


 


NT-Pro-B Natriuret Pep   


 


Albumin   


 


LDL Cholesterol Direct   














  09/12/18 09/12/18 09/12/18





  05:25 07:00 07:00


 


WBC   3.5 L 


 


RBC   5.77 H 


 


Hgb   17.0 H 


 


Hct   52.4 H 


 


RDW   17.5 H 


 


Lymph % (Auto)   


 


Mono % (Auto)   


 


Baso % (Auto)   


 


Lymph #   0.8 L 


 


Seg Neutrophils %   72.2 H 


 


Seg Neutrophils #   


 


PT    18.4 H


 


INR    1.44 H


 


D-Dimer   


 


POC ABG pO2   


 


Sodium   


 


Potassium   


 


Chloride   


 


Carbon Dioxide   


 


BUN   


 


Glucose   


 


POC Glucose  137 H  


 


Magnesium   


 


Total Bilirubin   


 


Direct Bilirubin   


 


CK-MB (CK-2)   


 


CK-MB (CK-2) Rel Index   


 


NT-Pro-B Natriuret Pep   


 


Albumin   


 


LDL Cholesterol Direct   














  09/12/18 09/12/18 09/12/18





  07:00 12:18 18:18


 


WBC   


 


RBC   


 


Hgb   


 


Hct   


 


RDW   


 


Lymph % (Auto)   


 


Mono % (Auto)   


 


Baso % (Auto)   


 


Lymph #   


 


Seg Neutrophils %   


 


Seg Neutrophils #   


 


PT   


 


INR   


 


D-Dimer   


 


POC ABG pO2   


 


Sodium  134 L  


 


Potassium   


 


Chloride  97.5 L  


 


Carbon Dioxide   


 


BUN  33 H  


 


Glucose  145 H  


 


POC Glucose   117 H  138 H


 


Magnesium   


 


Total Bilirubin   


 


Direct Bilirubin   


 


CK-MB (CK-2)   


 


CK-MB (CK-2) Rel Index   


 


NT-Pro-B Natriuret Pep   


 


Albumin   


 


LDL Cholesterol Direct   














  09/12/18 09/13/18 09/13/18





  21:21 02:02 05:00


 


WBC   


 


RBC   


 


Hgb   


 


Hct   


 


RDW   


 


Lymph % (Auto)   


 


Mono % (Auto)   


 


Baso % (Auto)   


 


Lymph #   


 


Seg Neutrophils %   


 


Seg Neutrophils #   


 


PT   


 


INR   


 


D-Dimer   


 


POC ABG pO2   


 


Sodium    132 L


 


Potassium   


 


Chloride    94.3 L


 


Carbon Dioxide   


 


BUN    33 H


 


Glucose    228 H


 


POC Glucose  146 H  167 H 


 


Magnesium   


 


Total Bilirubin   


 


Direct Bilirubin   


 


CK-MB (CK-2)   


 


CK-MB (CK-2) Rel Index   


 


NT-Pro-B Natriuret Pep   


 


Albumin   


 


LDL Cholesterol Direct   














  09/13/18 09/13/18 09/13/18





  05:00 10:33 14:01


 


WBC  3.8 L  


 


RBC  5.12 H  


 


Hgb  15.3 H  


 


Hct  46.4 H D  


 


RDW  17.7 H  


 


Lymph % (Auto)  12.9 L  


 


Mono % (Auto)   


 


Baso % (Auto)   


 


Lymph #  0.5 L  


 


Seg Neutrophils %  83.2 H  


 


Seg Neutrophils #   


 


PT   


 


INR   


 


D-Dimer   


 


POC ABG pO2   


 


Sodium   


 


Potassium   


 


Chloride   


 


Carbon Dioxide   


 


BUN   


 


Glucose   


 


POC Glucose   181 H  215 H


 


Magnesium   


 


Total Bilirubin   


 


Direct Bilirubin   


 


CK-MB (CK-2)   


 


CK-MB (CK-2) Rel Index   


 


NT-Pro-B Natriuret Pep   


 


Albumin   


 


LDL Cholesterol Direct   














  09/13/18 09/14/18 09/14/18





  22:17 02:17 06:36


 


WBC   


 


RBC   


 


Hgb   


 


Hct   


 


RDW   


 


Lymph % (Auto)   


 


Mono % (Auto)   


 


Baso % (Auto)   


 


Lymph #   


 


Seg Neutrophils %   


 


Seg Neutrophils #   


 


PT   


 


INR   


 


D-Dimer   


 


POC ABG pO2   


 


Sodium   


 


Potassium   


 


Chloride   


 


Carbon Dioxide   


 


BUN   


 


Glucose   


 


POC Glucose  161 H  161 H  178 H


 


Magnesium   


 


Total Bilirubin   


 


Direct Bilirubin   


 


CK-MB (CK-2)   


 


CK-MB (CK-2) Rel Index   


 


NT-Pro-B Natriuret Pep   


 


Albumin   


 


LDL Cholesterol Direct   














  09/14/18 09/14/18 09/14/18





  10:04 14:17 18:25


 


WBC   


 


RBC   


 


Hgb   


 


Hct   


 


RDW   


 


Lymph % (Auto)   


 


Mono % (Auto)   


 


Baso % (Auto)   


 


Lymph #   


 


Seg Neutrophils %   


 


Seg Neutrophils #   


 


PT   


 


INR   


 


D-Dimer   


 


POC ABG pO2   


 


Sodium   


 


Potassium   


 


Chloride   


 


Carbon Dioxide   


 


BUN   


 


Glucose   


 


POC Glucose  146 H  251 H  189 H


 


Magnesium   


 


Total Bilirubin   


 


Direct Bilirubin   


 


CK-MB (CK-2)   


 


CK-MB (CK-2) Rel Index   


 


NT-Pro-B Natriuret Pep   


 


Albumin   


 


LDL Cholesterol Direct   














  09/14/18 09/15/18 09/15/18





  21:44 01:56 04:21


 


WBC   


 


RBC   


 


Hgb   


 


Hct   


 


RDW   


 


Lymph % (Auto)   


 


Mono % (Auto)   


 


Baso % (Auto)   


 


Lymph #   


 


Seg Neutrophils %   


 


Seg Neutrophils #   


 


PT   


 


INR   


 


D-Dimer   


 


POC ABG pO2   


 


Sodium    133 L


 


Potassium   


 


Chloride    95.6 L


 


Carbon Dioxide    21 L


 


BUN    36 H


 


Glucose    160 H


 


POC Glucose  197 H  164 H 


 


Magnesium    2.50 H


 


Total Bilirubin   


 


Direct Bilirubin   


 


CK-MB (CK-2)   


 


CK-MB (CK-2) Rel Index   


 


NT-Pro-B Natriuret Pep   


 


Albumin   


 


LDL Cholesterol Direct   














  09/15/18 09/15/18





  04:21 05:36


 


WBC  12.6 H 


 


RBC  5.69 H 


 


Hgb  17.0 H 


 


Hct  52.5 H D 


 


RDW  18.6 H 


 


Lymph % (Auto)  8.3 L 


 


Mono % (Auto)  


 


Baso % (Auto)  


 


Lymph #  1.0 L 


 


Seg Neutrophils %  86.0 H 


 


Seg Neutrophils #  10.9 H 


 


PT  


 


INR  


 


D-Dimer  


 


POC ABG pO2  


 


Sodium  


 


Potassium  


 


Chloride  


 


Carbon Dioxide  


 


BUN  


 


Glucose  


 


POC Glucose   172 H


 


Magnesium  


 


Total Bilirubin  


 


Direct Bilirubin  


 


CK-MB (CK-2)  


 


CK-MB (CK-2) Rel Index  


 


NT-Pro-B Natriuret Pep  


 


Albumin  


 


LDL Cholesterol Direct  











Chest x-ray: image reviewed


Allied health notes reviewed: nursing

## 2018-09-15 NOTE — ULTRASOUND REPORT
FINAL REPORT



EXAM:  US CHEST



HISTORY:  swelling under left breast 



COMPARISON:  CT of the chest from July 2018. 



TECHNIQUE:  Several real-time grayscale and color Doppler images

were obtained. 



FINDINGS:  

There is heterogeneous attenuation the subcutaneous fat along the

inferior and anterior left chest wall. This could relate to

recent trauma. Inflammatory infectious process is not excluded.

No definable abscess or hematoma at this time. Short-term

followup exam suggested if clinical findings are not improved. 



IMPRESSION:  

There is heterogeneous attenuation the subcutaneous fat along the

inferior and anterior left chest wall. This could relate to

recent trauma. Inflammatory infectious process is not excluded.

No definable abscess or hematoma at this time. Short-term

followup exam suggested if clinical findings are not improved.

## 2018-09-15 NOTE — PROGRESS NOTE
Assessment and Plan





pulm htn


tricuspid regurgitation


bivent chf





continue current mgt


per pulmonary





Subjective


Date of service: 09/15/18


Principal diagnosis: Acute on Chronic Hypoxemic Respiratory Failure; AE-CHF; 

CMOP; Hypotension


Interval history: 





restsing quietly


no cp. states breathing is ok


family at bedside





Objective


 Vital Signs











  Temp Pulse Pulse Pulse Pulse Resp BP


 


 09/15/18 08:00  90.8 F L      


 


 09/15/18 06:16   98 H     16  104/77


 


 09/15/18 06:00   101 H     16  104/77


 


 09/15/18 05:46   100 H     17  101/76


 


 09/15/18 05:30   99 H     17  101/76


 


 09/15/18 05:16   105 H     16  103/71


 


 09/15/18 05:00   93 H     13  107/80


 


 09/15/18 04:46   92 H     17  103/71


 


 09/15/18 04:30   109 H     31 H  103/71


 


 09/15/18 04:16   107 H     17  114/82


 


 09/15/18 04:00   101 H     15  114/82


 


 09/15/18 03:55  98.8 F      


 


 09/15/18 03:46   92 H     13  119/88


 


 09/15/18 03:30   88     15  119/88


 


 09/15/18 03:16   86     16  111/84


 


 09/15/18 03:00   96 H     15  111/84


 


 09/15/18 02:46   80     18  109/82


 


 09/15/18 02:30   93 H     18  109/82


 


 09/15/18 02:16   99 H     22  96/73


 


 09/15/18 02:00   104 H     34 H  96/73


 


 09/15/18 01:46   102 H     21  103/73


 


 09/15/18 01:30   92 H     13  113/82


 


 09/15/18 01:16   93 H     15  106/82


 


 09/15/18 01:00   91 H     15  106/82


 


 09/15/18 00:46   87     14  86/60


 


 09/15/18 00:30   88     15  106/82


 


 09/15/18 00:16   82     17  126/95


 


 09/15/18 00:00   92 H     15  86/60


 


 09/14/18 23:46   95 H     16  126/95


 


 09/14/18 23:30   80     16  126/95


 


 09/14/18 23:21  98.4 F      


 


 09/14/18 23:20   75     16  125/97


 


 09/14/18 23:16   99 H     14  125/97


 


 09/14/18 23:00   80     15  125/97


 


 09/14/18 22:46   79     17  112/88


 


 09/14/18 22:30   96 H     22  107/83


 


 09/14/18 22:16   88     14  112/88


 


 09/14/18 22:00   84     16  112/88


 


 09/14/18 21:46   79     14  114/88


 


 09/14/18 21:30   94 H     20  107/85


 


 09/14/18 21:22  98.2 F      


 


 09/14/18 21:16   89     24  114/88


 


 09/14/18 21:00   82     21  114/88


 


 09/14/18 20:46   113 H     23  115/83


 


 09/14/18 20:30   91 H     18  115/83


 


 09/14/18 20:16   91 H     16  109/84


 


 09/14/18 20:00   91 H     19  109/84


 


 09/14/18 19:46   95 H     23  104/78


 


 09/14/18 19:30   111 H     27 H  102/79


 


 09/14/18 19:16   87     22  118/90


 


 09/14/18 19:00   100 H     20  104/78


 


 09/14/18 18:45   91 H     21  118/90


 


 09/14/18 18:30   90     22  118/90


 


 09/14/18 18:16   93 H     24  117/86


 


 09/14/18 18:00   92 H     24  117/86


 


 09/14/18 17:46   97 H     17  116/89


 


 09/14/18 17:30   90     19  116/89


 


 09/14/18 17:16   87     18  108/87


 


 09/14/18 17:00   84     21  108/87


 


 09/14/18 16:46   85     16  105/85


 


 09/14/18 16:42   93 H     10 L  105/85


 


 09/14/18 16:16   95 H     22  105/85


 


 09/14/18 16:00  97.1 F L  114 H  90  90  90  28 H  99/76


 


 09/14/18 15:46   91 H     31 H  99/76


 


 09/14/18 15:30   119 H     32 H  99/76


 


 09/14/18 15:16   109 H     31 H  107/83


 


 09/14/18 15:00   97 H     22  90/62


 


 09/14/18 14:46   96 H     21  90/62


 


 09/14/18 14:30   92 H     30 H  90/62


 


 09/14/18 14:16   94 H     28 H  82/47


 


 09/14/18 14:00   99 H     27 H  82/47


 


 09/14/18 13:46   102 H     28 H 


 


 09/14/18 13:30   98 H     22  76/56


 


 09/14/18 13:15   98 H     25 H  73/52


 


 09/14/18 13:01   100 H     22  76/56


 


 09/14/18 12:45   104 H     20  79/57


 


 09/14/18 12:30   105 H     26 H  73/52


 


 09/14/18 12:15   102 H     26 H  91/63


 


 09/14/18 12:00  96.1 F L  103 H  90  90  90  22  79/57


 


 09/14/18 11:45   107 H     27 H  86/65


 


 09/14/18 11:30   107 H     26 H  91/63


 


 09/14/18 11:15   103 H     23  114/87


 


 09/14/18 11:01   102 H     25 H  86/65


 


 09/14/18 10:45   102 H     24  114/87


 


 09/14/18 10:30   86     21  114/87


 


 09/14/18 10:15   92 H     20  112/85


 


 09/14/18 10:00   88     15  102/77


 


 09/14/18 09:45   87     24  116/81


 


 09/14/18 09:30   92 H     17  102/77


 


 09/14/18 09:22       


 


 09/14/18 09:15   83     16  116/81














  Pulse Ox


 


 09/15/18 08:00 


 


 09/15/18 06:16  91


 


 09/15/18 06:00  92


 


 09/15/18 05:46  91


 


 09/15/18 05:30  94


 


 09/15/18 05:16  92


 


 09/15/18 05:00  90


 


 09/15/18 04:46  91


 


 09/15/18 04:30  95


 


 09/15/18 04:16  92


 


 09/15/18 04:00  96


 


 09/15/18 03:55 


 


 09/15/18 03:46  91


 


 09/15/18 03:30  93


 


 09/15/18 03:16  92


 


 09/15/18 03:00  92


 


 09/15/18 02:46  92


 


 09/15/18 02:30  92


 


 09/15/18 02:16  92


 


 09/15/18 02:00  93


 


 09/15/18 01:46  91


 


 09/15/18 01:30  92


 


 09/15/18 01:16  92


 


 09/15/18 01:00  90


 


 09/15/18 00:46  91


 


 09/15/18 00:30  93


 


 09/15/18 00:16  92


 


 09/15/18 00:00  99


 


 09/14/18 23:46  93


 


 09/14/18 23:30 


 


 09/14/18 23:21 


 


 09/14/18 23:20  93


 


 09/14/18 23:16  93


 


 09/14/18 23:00  92


 


 09/14/18 22:46  93


 


 09/14/18 22:30 


 


 09/14/18 22:16  90


 


 09/14/18 22:00  91


 


 09/14/18 21:46  92


 


 09/14/18 21:30  92


 


 09/14/18 21:22 


 


 09/14/18 21:16  92


 


 09/14/18 21:00  94


 


 09/14/18 20:46  87


 


 09/14/18 20:30  92


 


 09/14/18 20:16  92


 


 09/14/18 20:00  93


 


 09/14/18 19:46  92


 


 09/14/18 19:30 


 


 09/14/18 19:16 


 


 09/14/18 19:00  96


 


 09/14/18 18:45 


 


 09/14/18 18:30  91


 


 09/14/18 18:16  89


 


 09/14/18 18:00 


 


 09/14/18 17:46  83 L


 


 09/14/18 17:30  90


 


 09/14/18 17:16  91


 


 09/14/18 17:00  100


 


 09/14/18 16:46 


 


 09/14/18 16:42 


 


 09/14/18 16:16  91


 


 09/14/18 16:00  91


 


 09/14/18 15:46  90


 


 09/14/18 15:30  89


 


 09/14/18 15:16  90


 


 09/14/18 15:00 


 


 09/14/18 14:46  91


 


 09/14/18 14:30  97


 


 09/14/18 14:16  91


 


 09/14/18 14:00  89


 


 09/14/18 13:46  94


 


 09/14/18 13:30  91


 


 09/14/18 13:15  92


 


 09/14/18 13:01  96


 


 09/14/18 12:45  91


 


 09/14/18 12:30  89


 


 09/14/18 12:15  98


 


 09/14/18 12:00  87


 


 09/14/18 11:45  90


 


 09/14/18 11:30  89


 


 09/14/18 11:15  89


 


 09/14/18 11:01  98


 


 09/14/18 10:45  94


 


 09/14/18 10:30  94


 


 09/14/18 10:15  90


 


 09/14/18 10:00  91


 


 09/14/18 09:45  92


 


 09/14/18 09:30  98


 


 09/14/18 09:22  92


 


 09/14/18 09:15  91














- Physical Examination


General: No Apparent Distress


HEENT: Positive: PERRL, Normocephaly, Mucus Membranes Moist


Neck: Positive: neck supple, trachea midline


Cardiac: Positive: Reg Rate and Rhythm, Systolic Murmur (2/6 sys mur lsb)


Lungs: Positive: Decreased Breath Sounds, Rales (basilar rales)


Neuro: Positive: Grossly Intact


Abdomen: Positive: Soft.  Negative: Tender


Skin: Negative: Wound


Extremities: Present: +2 Edema (BLE)





- Labs and Meds


 CBC











  09/15/18 Range/Units





  04:21 


 


WBC  12.6 H  (4.5-11.0)  K/mm3


 


RBC  5.69 H  (3.65-5.03)  M/mm3


 


Hgb  17.0 H  (10.1-14.3)  gm/dl


 


Hct  52.5 H D  (30.3-42.9)  %


 


Plt Count  177  (140-440)  K/mm3


 


Lymph #  1.0 L  (1.2-5.4)  K/mm3


 


Mono #  0.7  (0.0-0.8)  K/mm3


 


Eos #  0.0  (0.0-0.4)  K/mm3


 


Baso #  0.0  (0.0-0.1)  K/mm3








 Comprehensive Metabolic Panel











  09/15/18 Range/Units





  04:21 


 


Sodium  133 L  (137-145)  mmol/L


 


Potassium  4.8  (3.6-5.0)  mmol/L


 


Chloride  95.6 L  ()  mmol/L


 


Carbon Dioxide  21 L  (22-30)  mmol/L


 


BUN  36 H  (7-17)  mg/dL


 


Creatinine  1.2  (0.7-1.2)  mg/dL


 


Glucose  160 H  ()  mg/dL


 


Calcium  9.0  (8.4-10.2)  mg/dL














- Imaging and Cardiology


EKG: report reviewed, image reviewed


Echo: report reviewed (7/9/2018: VICENTE; EF 45-50%, marked RA enlargement, mod RV 

enlargement, mod RV systolic dysfunction, trace MR, mod to severe TR, mild pulm 

HTN with RVSP 38mmHg, mod left to right shunt suggestive of ostium secundum 

atrial septal defect)


Cardiac cath: report reviewed (7/10/2018: LHC & RHC, NO ASD, no O2 sat step-up, 

EF 35%, normal coronaries, mod pulmonary HTN. )





- Allied health notes


Allied health notes reviewed: nursing

## 2018-09-15 NOTE — PROGRESS NOTE
Assessment and Plan


Assessment and plan: 


--Hypotension; continue midodrine and dopamine ,blood pressures minimally 

improved


Beta blockers and ace inhibitors are on hold, Lasix as needed, cardiology 

following





--Acute on chronic systolic congestive heart failure; with severe lower 

extremity edema


Ejection fraction 45-50%, management per cardiology





--Biventricular congestive heart failure





--Nonischemic cardiomyopathy





--Acute on chronic respiratory failure; patient is stable on nasal cannula 

oxygen


 BiPAP as needed , evaluate for home oxygen at discharge





--New onset paroxysmal A. fib with rapid ventricular rate; no new episodes of 

paroxysmal A. fib


 cardiology felt no need for chronic anticoagulation changed Lovenox to DVT 

prophylaxis dose. 





--Mild malnutrition; supportive care and nutrition supplements





--DVT prophylaxis; Lovenox





--Full CODE STATUS





Cardiology was planning to transfer the patient to [facility] per 

daughter's request


Dr. Derek Miller agreed to accept the patient,However  [facility] denied 

transfer.





Plan of care reviewed with the patient and the family member at the bedside





Critical care time 32 minutes








History


Interval history: 


Patient seen and examined medical records reviewed


Remains hypotensive on dopamine and Midodrine


Patient complains of generalized weakness


Alert awake oriented 3


Vital signs reviewed











Hospitalist Physical





- Constitutional


Vitals: 


 











Temp Pulse Resp BP Pulse Ox


 


 90.8 F L  103 H  21   99/77   91 


 


 09/15/18 08:00  09/15/18 10:00  09/15/18 10:00  09/15/18 09:16  09/15/18 09:35











General appearance: Present: no acute distress, well-nourished, other (nasal 

cannula oxygen)





- EENT


Eyes: Present: PERRL, EOM intact





- Neck


Neck: Present: supple, normal ROM





- Respiratory


Respiratory effort: normal


Respiratory: bilateral: diminished, rales, negative: rhonchi, wheezing





- Cardiovascular


Rhythm: regular


Heart Sounds: Present: S1 & S2





- Extremities


Extremities: no ischemia


Extremity abnormal: edema


Peripheral Pulses: within normal limits





- Abdominal


General gastrointestinal: soft, non-tender, non-distended, normal bowel sounds





- Integumentary


Integumentary: Present: clear, warm





- Psychiatric


Psychiatric: appropriate mood/affect, cooperative





- Neurologic


Neurologic: CNII-XII intact, moves all extremities





Results





- Labs


CBC & Chem 7: 


 09/15/18 04:21





 09/15/18 04:21


Labs: 


 Laboratory Last Values











WBC  12.6 K/mm3 (4.5-11.0)  H  09/15/18  04:21    


 


RBC  5.69 M/mm3 (3.65-5.03)  H  09/15/18  04:21    


 


Hgb  17.0 gm/dl (10.1-14.3)  H  09/15/18  04:21    


 


Hct  52.5 % (30.3-42.9)  H D 09/15/18  04:21    


 


MCV  92 fl (79-97)   09/15/18  04:21    


 


MCH  30 pg (28-32)   09/15/18  04:21    


 


MCHC  32 % (30-34)   09/15/18  04:21    


 


RDW  18.6 % (13.2-15.2)  H  09/15/18  04:21    


 


Plt Count  177 K/mm3 (140-440)   09/15/18  04:21    


 


Lymph % (Auto)  8.3 % (13.4-35.0)  L  09/15/18  04:21    


 


Mono % (Auto)  5.6 % (0.0-7.3)   09/15/18  04:21    


 


Eos % (Auto)  0.0 % (0.0-4.3)   09/15/18  04:21    


 


Baso % (Auto)  0.1 % (0.0-1.8)   09/15/18  04:21    


 


Lymph #  1.0 K/mm3 (1.2-5.4)  L  09/15/18  04:21    


 


Mono #  0.7 K/mm3 (0.0-0.8)   09/15/18  04:21    


 


Eos #  0.0 K/mm3 (0.0-0.4)   09/15/18  04:21    


 


Baso #  0.0 K/mm3 (0.0-0.1)   09/15/18  04:21    


 


Seg Neutrophils %  86.0 % (40.0-70.0)  H  09/15/18  04:21    


 


Seg Neutrophils #  10.9 K/mm3 (1.8-7.7)  H  09/15/18  04:21    


 


PT  18.4 Sec. (12.2-14.9)  H  09/12/18  07:00    


 


INR  1.44  (0.87-1.13)  H  09/12/18  07:00    


 


APTT  29.2 Sec. (24.2-36.6)   08/31/18  23:23    


 


D-Dimer  1149.50 ng/mlDDU (0-234)  H  08/31/18  23:23    


 


POC ABG pH  7.399  (7.35-7.45)   09/10/18  14:16    


 


POC ABG pCO2  38.9  (35-45)   09/10/18  14:16    


 


POC ABG pO2  63  ()  L  09/10/18  14:16    


 


POC ABG HCO3  24.1   09/10/18  14:16    


 


POC ABG Total CO2  25   09/10/18  14:16    


 


POC ABG O2 Sat  92   09/10/18  14:16    


 


POC ABG Base Excess  -1   09/10/18  14:16    


 


FiO2  45 %  09/10/18  14:16    


 


Sodium  133 mmol/L (137-145)  L  09/15/18  04:21    


 


Potassium  4.8 mmol/L (3.6-5.0)   09/15/18  04:21    


 


Chloride  95.6 mmol/L ()  L  09/15/18  04:21    


 


Carbon Dioxide  21 mmol/L (22-30)  L  09/15/18  04:21    


 


Anion Gap  21 mmol/L  09/15/18  04:21    


 


BUN  36 mg/dL (7-17)  H  09/15/18  04:21    


 


Creatinine  1.2 mg/dL (0.7-1.2)   09/15/18  04:21    


 


Estimated GFR  55 ml/min  09/15/18  04:21    


 


BUN/Creatinine Ratio  30 %  09/15/18  04:21    


 


Glucose  160 mg/dL ()  H  09/15/18  04:21    


 


POC Glucose  157  ()  H  09/15/18  10:48    


 


Calcium  9.0 mg/dL (8.4-10.2)   09/15/18  04:21    


 


Phosphorus  3.00 mg/dL (2.5-4.5)   09/05/18  05:57    


 


Magnesium  2.50 mg/dL (1.7-2.3)  H  09/15/18  04:21    


 


Total Bilirubin  2.10 mg/dL (0.1-1.2)  H  09/07/18  04:37    


 


Direct Bilirubin  1.0 mg/dL (0-0.2)  H  09/07/18  04:37    


 


Indirect Bilirubin  1.1 mg/dL  09/07/18  04:37    


 


AST  18 units/L (5-40)   09/07/18  04:37    


 


ALT  23 units/L (7-56)   09/07/18  04:37    


 


Alkaline Phosphatase  111 units/L ()   09/07/18  04:37    


 


Total Creatine Kinase  59 units/L ()   09/05/18  17:05    


 


CK-MB (CK-2)  4.8 ng/mL (0.0-4.0)  H  09/05/18  17:05    


 


CK-MB (CK-2) Rel Index  8.1  (0-4)  H  09/05/18  17:05    


 


Troponin T  < 0.010 ng/mL (0.00-0.029)   09/05/18  17:05    


 


NT-Pro-B Natriuret Pep  3883 pg/mL (0-900)  H  09/05/18  17:05    


 


Total Protein  6.8 g/dL (6.3-8.2)   09/07/18  04:37    


 


Albumin  3.1 g/dL (3.9-5)  L  09/07/18  04:37    


 


Albumin/Globulin Ratio  0.8 %  09/07/18  04:37    


 


Triglycerides  54 mg/dL (2-149)   09/04/18  08:02    


 


Cholesterol  95 mg/dL ()   09/04/18  08:02    


 


LDL Cholesterol Direct  42 mg/dL ()  L  09/04/18  08:02    


 


HDL Cholesterol  49 mg/dL (40-59)   09/04/18  08:02    


 


Cholesterol/HDL Ratio  1.93 %  09/04/18  08:02

## 2018-09-15 NOTE — XRAY REPORT
FINAL REPORT



EXAM:  XR ABDOMEN 1V AP



HISTORY:  NGT placement 



COMPARISON:  Chest radiograph performed on 08/31/2018



TECHNIQUE:  Two views of the abdomen



FINDINGS:  

Enteric tube with tip in the antrum of the stomach. A portion of

the tube is coiled on itself.



The visualized portion of the bowel is normal. There are streaky

opacities in the right lung base that may reflect atelectasis or

infiltrate. 



IMPRESSION:  

Enteric tube with tip in the antrum of the stomach. A portion of

the tube is coiled on itself.

## 2018-09-16 VITALS — SYSTOLIC BLOOD PRESSURE: 201 MMHG | DIASTOLIC BLOOD PRESSURE: 113 MMHG

## 2018-09-16 PROCEDURE — 5A12012 PERFORMANCE OF CARDIAC OUTPUT, SINGLE, MANUAL: ICD-10-PCS | Performed by: INTERNAL MEDICINE

## 2018-09-16 PROCEDURE — 5A09357 ASSISTANCE WITH RESPIRATORY VENTILATION, LESS THAN 24 CONSECUTIVE HOURS, CONTINUOUS POSITIVE AIRWAY PRESSURE: ICD-10-PCS | Performed by: INTERNAL MEDICINE

## 2018-09-16 PROCEDURE — 5A1935Z RESPIRATORY VENTILATION, LESS THAN 24 CONSECUTIVE HOURS: ICD-10-PCS | Performed by: INTERNAL MEDICINE

## 2018-09-16 PROCEDURE — 0BH17EZ INSERTION OF ENDOTRACHEAL AIRWAY INTO TRACHEA, VIA NATURAL OR ARTIFICIAL OPENING: ICD-10-PCS | Performed by: INTERNAL MEDICINE

## 2018-09-16 RX ADMIN — PHENYLEPHRINE HYDROCHLORIDE SCH MLS/HR: 10 INJECTION INTRAVENOUS at 02:12

## 2018-09-16 RX ADMIN — MILRINONE LACTATE IN DEXTROSE SCH MLS/HR: 200 INJECTION, SOLUTION INTRAVENOUS at 03:25

## 2018-09-16 RX ADMIN — PHENYLEPHRINE HYDROCHLORIDE SCH MLS/HR: 10 INJECTION INTRAVENOUS at 03:26

## 2018-09-16 RX ADMIN — PHENYLEPHRINE HYDROCHLORIDE SCH MLS/HR: 10 INJECTION INTRAVENOUS at 07:53

## 2018-09-16 NOTE — DEATH SUMMARY
Death Summary





- Providers


Date of service: 18


Consults: 


 





18 15:24


Consult to Physician [CONS] Routine 


   Comment: called answ. serv./ jess


   Consulting Provider: MARVA SINGH


   Physician Instructions: 


   Reason For Exam: ac on chronic CHF/hypotension





18 12:53


Consult to Physician [CONS] Urgent 


   Comment: I spoke with 


   Consulting Provider: SADA RODRIGUEZ


   Physician Instructions: 


   Reason For Exam: Cr Care consult/Cardiogenic shock/Cardiomyopathy





18 13:19


PICC Line Placement [Consult to PICC Line RN] [CONS] Routine 


   Reason For Exam: Hypotension/Dopamin drip


   Type Line:: PICC





18 11:24


Physical Therapy Evaluation and Treat [CONS] Routine 


   Comment: 


   Reason For Exam: deconditioning





09/15/18 12:55


Consult to Dietitian/Nutrition [CONS] Routine 


   Physician Instructions: 


   Reason For Exam: 


   Reason for Consult: Write/Manage Tube Feeding





18 05:50


Consult to Dietitian/Nutrition [CONS] Routine 


   Physician Instructions: 


   Reason For Exam: 


   Reason for Consult: Write/Manage TPN/PPN











Attending: 


AMY J KOCHERLA








- Death summary


Date of admission: 


18 09:19





Date of Death: 18 (09:01am)


Disposition:

## 2018-09-16 NOTE — XRAY REPORT
FINAL REPORT



PROCEDURE:  XR CHEST 1V AP



TECHNIQUE:  Chest radiograph anteroposterior view. CPT 09005







HISTORY:  ETT placement 



COMPARISON:  08/31/2018



FINDINGS:  

Heart: Normal.



Mediastinum/Vessels: Normal.



Lungs/Pleural space: There are bilateral perihilar infiltrates.

There are no pleural effusions or pneumothoraces..



Bony thorax: No acute osseous abnormality.



Life support devices: Endotracheal tube is in the mid trachea. NG

tube is in the stomach. There is a right-sided PICC line. The tip

is in the superior vena cava..



IMPRESSION:  

Heart size is normal.



There are bilateral perihilar infiltrates. There are no pleural

effusions or pneumothoraces..



Endotracheal tube is in the mid trachea. NG tube is in the

stomach. There is a right-sided PICC line. The tip is in the

superior vena cava..

## 2018-09-16 NOTE — EVENT NOTE
Date: 09/16/18





PATIENT BECAME BRADYCARDIC WITH LETHERGY, PULSE WAS DOWN TO THE 20'S  AND CODE 

BLUE CALLED ,


PATIENT WAS RESUSCITATED AND SUBSEQUENTLY INTUBATED AND MECHANICALLY VENTILATED

## 2018-09-18 NOTE — EVENT NOTE
Date: 09/05/18


Patient had an episode of severe bradycardia and hypotension when she returned 

from the bathroom.


Code MET/rapid response was called, code team arrived


When I evaluated the patient ,patient is alert and awake ,responds appropriately

,hypotensive with HR 86/min


Cardiology adjusted meds and patient is transferred to ICU with dopamine drip 

for close observation.


Critical care time 35 min

## 2018-09-18 NOTE — PROGRESS NOTE
Assessment and Plan


Assessment and plan: 


--Hypotension; blood pressures are still in the lower range, secondary to 

congestive heart failure


Hold antihypertensives, Lasix as tolerated, On Midodrin 10 mg twice a day, 


Patient was on dobutamine drip, however had cardiac arrhythmia, dobutamine 

discontinued


Cardiology considering Cardioversion.





--New onset A. fib with rapid ventricular rate; paroxysmal


Cardiology considering amiodarone, full dose anticoagulation





--Acute on chronic systolic congestive heart failure;


Ejection fraction 45-50%, oxygen titrated to O2 sats more than 90%


Patient is hypotensive ,hold IV diuretics, beta blockers, ace inhibitors doses, 


input output monitoring, Low-sodium diet.





--Acute on chronic respiratory failure; mild improvement


Secondary to acute exacerbation of congestive heart failure


oxygen titrated O2 sats more than 90%





--History of Hypertension; she is currently hypotensive, hold all BP meds





--Mild malnutrition; supportive care and nutrition supplements





--DVT prophylaxis; Lovenox





--Full CODE STATUS





Physical therapy occupational therapy when stable





Closely monitor the patient and adjust the management as needed.


[facility] reviewed with patient and her nurse.














History


Interval history: 


Patient seen and evaluated, med records reviewed,


Last night events noted


Patient had tachycardia,and hypotension


Remains hypotensive this am.


Pt c/o gen weakness.


Vital signs reviewed








Hospitalist Physical





- Constitutional


Vitals: 


 











Temp Pulse Resp BP Pulse Ox


 


 97.2 F L  102 H  64 H  201/113   18 L


 


 09/15/18 12:00  09/16/18 09:00  09/16/18 09:00  09/16/18 09:00  09/16/18 08:46











General appearance: Present: no acute distress, well-nourished, other (nasal 

cannula oxygen)





- EENT


Eyes: Present: PERRL, EOM intact





- Neck


Neck: Present: supple, normal ROM





- Respiratory


Respiratory effort: normal


Respiratory: bilateral: diminished, rales, negative: rhonchi, wheezing





- Cardiovascular


Rhythm: regular


Heart Sounds: Present: S1 & S2





- Extremities


Extremities: no ischemia


Extremity abnormal: edema





- Abdominal


General gastrointestinal: soft, non-tender, non-distended, normal bowel sounds





- Integumentary


Integumentary: Present: clear, warm





- Psychiatric


Psychiatric: appropriate mood/affect, cooperative





- Neurologic


Neurologic: moves all extremities





Results





- Labs


CBC & Chem 7: 


 09/15/18 04:21





 09/15/18 04:21


Labs: 


 Laboratory Last Values











WBC  12.6 K/mm3 (4.5-11.0)  H  09/15/18  04:21    


 


RBC  5.69 M/mm3 (3.65-5.03)  H  09/15/18  04:21    


 


Hgb  17.0 gm/dl (10.1-14.3)  H  09/15/18  04:21    


 


Hct  52.5 % (30.3-42.9)  H D 09/15/18  04:21    


 


MCV  92 fl (79-97)   09/15/18  04:21    


 


MCH  30 pg (28-32)   09/15/18  04:21    


 


MCHC  32 % (30-34)   09/15/18  04:21    


 


RDW  18.6 % (13.2-15.2)  H  09/15/18  04:21    


 


Plt Count  177 K/mm3 (140-440)   09/15/18  04:21    


 


Lymph % (Auto)  8.3 % (13.4-35.0)  L  09/15/18  04:21    


 


Mono % (Auto)  5.6 % (0.0-7.3)   09/15/18  04:21    


 


Eos % (Auto)  0.0 % (0.0-4.3)   09/15/18  04:21    


 


Baso % (Auto)  0.1 % (0.0-1.8)   09/15/18  04:21    


 


Lymph #  1.0 K/mm3 (1.2-5.4)  L  09/15/18  04:21    


 


Mono #  0.7 K/mm3 (0.0-0.8)   09/15/18  04:21    


 


Eos #  0.0 K/mm3 (0.0-0.4)   09/15/18  04:21    


 


Baso #  0.0 K/mm3 (0.0-0.1)   09/15/18  04:21    


 


Seg Neutrophils %  86.0 % (40.0-70.0)  H  09/15/18  04:21    


 


Seg Neutrophils #  10.9 K/mm3 (1.8-7.7)  H  09/15/18  04:21    


 


PT  18.4 Sec. (12.2-14.9)  H  09/12/18  07:00    


 


INR  1.44  (0.87-1.13)  H  09/12/18  07:00    


 


APTT  29.2 Sec. (24.2-36.6)   08/31/18  23:23    


 


D-Dimer  1149.50 ng/mlDDU (0-234)  H  08/31/18  23:23    


 


POC ABG pH  7.078  (7.35-7.45)  L  09/16/18  05:30    


 


POC ABG pCO2  28.3  (35-45)  L  09/16/18  05:30    


 


POC ABG pO2  56  ()  L  09/16/18  05:30    


 


POC ABG HCO3  8.4   09/16/18  05:30    


 


POC ABG Total CO2  9   09/16/18  05:30    


 


POC ABG O2 Sat  77   09/16/18  05:30    


 


POC ABG Base Excess  -22   09/16/18  05:30    


 


FiO2  100 %  09/16/18  05:30    


 


Sodium  133 mmol/L (137-145)  L  09/15/18  04:21    


 


Potassium  4.8 mmol/L (3.6-5.0)   09/15/18  04:21    


 


Chloride  95.6 mmol/L ()  L  09/15/18  04:21    


 


Carbon Dioxide  21 mmol/L (22-30)  L  09/15/18  04:21    


 


Anion Gap  21 mmol/L  09/15/18  04:21    


 


BUN  36 mg/dL (7-17)  H  09/15/18  04:21    


 


Creatinine  1.2 mg/dL (0.7-1.2)   09/15/18  04:21    


 


Estimated GFR  55 ml/min  09/15/18  04:21    


 


BUN/Creatinine Ratio  30 %  09/15/18  04:21    


 


Glucose  160 mg/dL ()  H  09/15/18  04:21    


 


POC Glucose  137  ()  H  09/16/18  00:26    


 


Calcium  9.0 mg/dL (8.4-10.2)   09/15/18  04:21    


 


Phosphorus  3.00 mg/dL (2.5-4.5)   09/05/18  05:57    


 


Magnesium  2.50 mg/dL (1.7-2.3)  H  09/15/18  04:21    


 


Total Bilirubin  2.10 mg/dL (0.1-1.2)  H  09/07/18  04:37    


 


Direct Bilirubin  1.0 mg/dL (0-0.2)  H  09/07/18  04:37    


 


Indirect Bilirubin  1.1 mg/dL  09/07/18  04:37    


 


AST  18 units/L (5-40)   09/07/18  04:37    


 


ALT  23 units/L (7-56)   09/07/18  04:37    


 


Alkaline Phosphatase  111 units/L ()   09/07/18  04:37    


 


Total Creatine Kinase  59 units/L ()   09/05/18  17:05    


 


CK-MB (CK-2)  4.8 ng/mL (0.0-4.0)  H  09/05/18  17:05    


 


CK-MB (CK-2) Rel Index  8.1  (0-4)  H  09/05/18  17:05    


 


Troponin T  < 0.010 ng/mL (0.00-0.029)   09/05/18  17:05    


 


NT-Pro-B Natriuret Pep  3883 pg/mL (0-900)  H  09/05/18  17:05    


 


Total Protein  6.8 g/dL (6.3-8.2)   09/07/18  04:37    


 


Albumin  3.1 g/dL (3.9-5)  L  09/07/18  04:37    


 


Albumin/Globulin Ratio  0.8 %  09/07/18  04:37    


 


Triglycerides  54 mg/dL (2-149)   09/04/18  08:02    


 


Cholesterol  95 mg/dL ()   09/04/18  08:02    


 


LDL Cholesterol Direct  42 mg/dL ()  L  09/04/18  08:02    


 


HDL Cholesterol  49 mg/dL (40-59)   09/04/18  08:02    


 


Cholesterol/HDL Ratio  1.93 %  09/04/18  08:02    


 


MATT Screen  Negative  (Negative)   09/12/18  16:17    


 


Proteinase 3 (PR3) Ab  <1.0 AI (<1.0)   09/12/18  16:20    


 


Myeloperoxidase Ab  <1.0 AI (<1.0)   09/12/18  16:20

## 2023-05-10 NOTE — PROGRESS NOTE
Assessment and Plan





Patient has biventricular CHF - predominantly right sided HF.D/C Lisinopril (

ch.cough). Place her on Losartan.Cause for her predominately R heart failure is 

not clear.No H/O pul.embolism in the past.Intracardiac shunt has to be ruled 

out. Will plan for VICENTE in AM. Benefits and risks of the procedure were 

explained to the patient in detail.She understands and wants us to proceed.





- Patient Problems


(1) Biventricular congestive heart failure


Current Visit: Yes   Status: Acute   





(2) Abnormal EKG


Current Visit: Yes   Status: Acute   





(3) Hypokalemia


Current Visit: Yes   Status: Acute   





(4) Cor pulmonale


Current Visit: Yes   Status: Chronic   





(5) Chronic cough


Current Visit: Yes   Status: Chronic   





(6) Hyperglycemia


Current Visit: Yes   Status: Acute   





(7) Hypoxia


Current Visit: Yes   Status: Acute   





(8) Shortness of breath


Current Visit: Yes   Status: Acute   





Subjective


Date of service: 07/08/18


Interval history: 





K, BUN, Cr: Normal.Hb A1C 9.1.Echo: LVEF 45 to 50%. R heart 

enlargement.Moderate RV systolic dysfunction. Mild to moderate TR, Mild pul.htn 

with RVSP of 44 mm Hg.Mildly dilated noncollapsible IVC.SOB, Swelling legs- 

better.Still has cough.





Objective


 Vital Signs











  Temp Pulse Pulse Pulse Pulse Pulse Resp


 


 07/08/18 14:21    95 H  95 H   


 


 07/08/18 14:10    88  88   


 


 07/08/18 11:15       


 


 07/08/18 10:00       


 


 07/08/18 09:30       88 


 


 07/08/18 09:15       96 H 


 


 07/08/18 08:44  97.3 F L  92 H      18


 


 07/08/18 04:24  97.6 F  76      20


 


 07/07/18 23:22  97.5 F L  89      20


 


 07/07/18 22:15      100 H   18


 


 07/07/18 22:03   100 H     


 


 07/07/18 21:09       


 


 07/07/18 20:17   101 H     


 


 07/07/18 20:13  97.9 F  100 H      20


 


 07/07/18 16:16  97.9 F  94 H      20


 


 07/07/18 16:14       99 H 


 


 07/07/18 16:07       


 


 07/07/18 15:57       99 H 


 


 07/07/18 15:48  97.9 F  94 H     














  Resp Resp Resp BP BP Pulse Ox


 


 07/08/18 14:21  20  20    


 


 07/08/18 14:10  18  18    


 


 07/08/18 11:15     108/73  


 


 07/08/18 10:00       96


 


 07/08/18 09:30    20   


 


 07/08/18 09:15    18   


 


 07/08/18 08:44     108/73   94


 


 07/08/18 04:24     102/70   90


 


 07/07/18 23:22     101/73   93


 


 07/07/18 22:15       92


 


 07/07/18 22:03     100/66  


 


 07/07/18 21:09       96


 


 07/07/18 20:17      


 


 07/07/18 20:13     100/66   91


 


 07/07/18 16:16      113/79  95


 


 07/07/18 16:14    20   


 


 07/07/18 16:07       95


 


 07/07/18 15:57    20   


 


 07/07/18 15:48     113/79   90














- Physical Examination


General: No Apparent Distress


HEENT: Positive: Normocephaly, Mucus Membranes Moist


Neck: Positive: neck supple, trachea midline


Cardiac: Positive: Reg Rate and Rhythm, Other (Loud S2+)


Lungs: Positive: clear to auscultation


Neuro: Positive: Grossly Intact


Abdomen: Positive: Soft, Active Bowel Sounds


Skin: Positive: Clear.  Negative: Rash


Musculoskeletal: No Fluid Collection, No Pain, Normal Range of Motion


Extremities: Present: upper extr. pulses, lower extr. pulses, +1 Edema





- Labs and Meds


 Comprehensive Metabolic Panel











  07/08/18 Range/Units





  07:30 


 


Sodium  138  (137-145)  mmol/L


 


Potassium  4.8  D  (3.6-5.0)  mmol/L


 


Chloride  99.8  ()  mmol/L


 


Carbon Dioxide  22  (22-30)  mmol/L


 


BUN  19 H  (7-17)  mg/dL


 


Creatinine  0.8  (0.7-1.2)  mg/dL


 


Glucose  114 H  ()  mg/dL


 


Calcium  9.1  (8.4-10.2)  mg/dL














- Imaging and Cardiology


EKG: report reviewed, image reviewed


Echo: report reviewed, image reviewed





- Telemetry


EKG Rhythm: Sinus Rhythm





- EKG


Sinus rhythms and dysrhythmias: sinus rhythm


QRS axis and voltage: right axis deviation


Chamber hypertrophy or enlargement: right atrial enlargment, righ ventricular 

hypertro


Repolarization changes or abnormalities: nonspecific abnormality, ST segment, 

and/or T wave Radiology Post-Procedure Note    Pre Op Diagnosis: Ascites  Post Op Diagnosis: Same    Procedure: Ultrasound Guided Paracentesis    Procedure performed by: Olesya Walters PA-C    Written Informed Consent Obtained: Yes  Specimen Removed: YES 6000 ml dark yellow ascitic fluid  Estimated Blood Loss: Minimal    Findings:   Successful paracentesis.  Albumin administered PRN per protocol.    Patient tolerated procedure well.    Olesya Walters PA-C